# Patient Record
Sex: FEMALE | Race: BLACK OR AFRICAN AMERICAN | NOT HISPANIC OR LATINO | Employment: FULL TIME | URBAN - METROPOLITAN AREA
[De-identification: names, ages, dates, MRNs, and addresses within clinical notes are randomized per-mention and may not be internally consistent; named-entity substitution may affect disease eponyms.]

---

## 2017-11-07 ENCOUNTER — OFFICE VISIT (OUTPATIENT)
Dept: OBSTETRICS AND GYNECOLOGY | Facility: CLINIC | Age: 32
End: 2017-11-07
Payer: COMMERCIAL

## 2017-11-07 VITALS
BODY MASS INDEX: 29.73 KG/M2 | WEIGHT: 196.19 LBS | DIASTOLIC BLOOD PRESSURE: 80 MMHG | SYSTOLIC BLOOD PRESSURE: 120 MMHG | HEIGHT: 68 IN

## 2017-11-07 DIAGNOSIS — N91.2 AMENORRHEA: Primary | ICD-10-CM

## 2017-11-07 DIAGNOSIS — Z32.01 POSITIVE PREGNANCY TEST: ICD-10-CM

## 2017-11-07 LAB
B-HCG UR QL: POSITIVE
CTP QC/QA: YES

## 2017-11-07 PROCEDURE — 81025 URINE PREGNANCY TEST: CPT | Mod: S$GLB,,, | Performed by: OBSTETRICS & GYNECOLOGY

## 2017-11-07 PROCEDURE — 99213 OFFICE O/P EST LOW 20 MIN: CPT | Mod: S$GLB,,, | Performed by: OBSTETRICS & GYNECOLOGY

## 2017-11-07 PROCEDURE — 99999 PR PBB SHADOW E&M-EST. PATIENT-LVL II: CPT | Mod: PBBFAC,,, | Performed by: OBSTETRICS & GYNECOLOGY

## 2017-11-14 NOTE — PROGRESS NOTES
"[facility]  Ambulatory Clinic  Obstetrics & Gynecology    Visit Date:  2017     Chief Complaint:  Missed my period    History of Present Illness:      Pilar Hernandez is a 32 y.o. , UPT positive today, here with c/o missed period.    LMP was 2017.      Pt has no major complaints today and denies any vaginal bleeding, discharge, pain, GI/ compliants.      Pt reports overall good health.    Past Medical History:      None      Past Surgical History:     Vertical sleeve - 2013    Medications:     Prenatal vitamins     Allergies:     NKDA      Obstetric History:      G1, current    Gynecologic History:      Denies recent/active STI  Denies history abnormal Pap     Social History:      Denies tobacco, alcohol or illicit drug use  Current partner is father of baby  Denies domestic abuse     Family History:     Denies congenital anomalies, inherited syndromes, fetal aneuploidy    Review of Systems:      Constitutional:  No fever, fatigue  HENT:  No congestion, hearing changes  Eyes:  No visual disturbance  Respiratory:  No cough, shortness of breath  Cardiovascular:  No chest pain, leg swelling  Breast:  No lump, pain, nipple discharge, redness, skin changes  Gastrointestinal:  No abdominal pain, constipation, blood in stool   Genitourinary:  No dysuria, frequency  Endocrine:  No heat or cold intolerance  Musculoskeletal:  No back pain, arthralgias  Skin:  No rash, jaundice  Neurological:  No dizziness, weakness, headaches  Psychiatric/Behavioral:  No sleep disturbance, dysphoric mood     Physical Exam:     /80 (BP Location: Right arm, Patient Position: Sitting, BP Method: Large (Manual))   Ht 5' 8" (1.727 m)   Wt 89 kg (196 lb 3.4 oz)   LMP 2017 (Exact Date)   BMI 29.83 kg/m²      GENERAL:  NAD. Well-nourished. A&Ox3.  HEENT:  NCAT, EOMI, moist mucus membranes.  Neck supple w/o masses.  BREAST:  Symmetric, no obvious masses, adenopathy, skin changes or " nipple discharge.  LUNGS:  CTA-B.  HEART:  RRR, physiologic heart sounds.  ABDOMEN:  Soft, non-tender. Normoactive BS.  No obvious organomegaly.   EXT:  Symmetric w/o cramping, claudication, or edema. +2 distal pulses. FROM.  SKIN:  No rashes  NEURO:  CN II - XII grossly intact bilaterally. +2 DTR.  PSYCH:  Mood & affect appropriate.       PELVIC:  Female external genitalia w/o any obvious lesions.  Adequate perineal body. Normal urethral meatus. No gross lymphadenopathy.    Vagina:  Pink, moist, well-rugated.  Vaginal vault with good support.  No obvious lesion.  No discharge noted.     Cervix:  No cervical motion tenderness, discharge, or obvious lesions.  Closed, thick, posterior.  Uterus:  Small, non-tender, normal contour.  Adnexa:  No masses or tenderness.    Rectal:  Declined.  No obvious external lesions.   Wet prep:  Negative    Chaperone present for exam.    Assessment:     32 y.o. , UPT positive, LMP 2017, here for confirmation of pregnancy    Plan:    We discussed principles of prenatal care, weight gain goals, dietary/lifestyle modifications, pregnancy care instructions and precautions.  Prenatal educational material given to pt.  Continue prenatal vitamins.  SAB and ectopic precautions reviewed.      Return in 4 weeks to initiate prenatal care.  Go to ED for emergencies.  All questions answered, pt voiced understanding.      Julio Cesar Elias MD

## 2018-01-09 ENCOUNTER — INITIAL PRENATAL (OUTPATIENT)
Dept: OBSTETRICS AND GYNECOLOGY | Facility: CLINIC | Age: 33
End: 2018-01-09
Payer: COMMERCIAL

## 2018-01-09 VITALS
BODY MASS INDEX: 30.5 KG/M2 | WEIGHT: 200.63 LBS | DIASTOLIC BLOOD PRESSURE: 82 MMHG | SYSTOLIC BLOOD PRESSURE: 130 MMHG | HEART RATE: 60 BPM

## 2018-01-09 DIAGNOSIS — Z36.89 ENCOUNTER TO ESTABLISH GESTATIONAL AGE USING ULTRASOUND: ICD-10-CM

## 2018-01-09 DIAGNOSIS — Z34.91 PREGNANCY WITH ONE FETUS IN FIRST TRIMESTER: Primary | ICD-10-CM

## 2018-01-09 PROBLEM — Z34.90 PREGNANCY WITH ONE FETUS: Status: ACTIVE | Noted: 2018-01-09

## 2018-01-09 PROCEDURE — 0502F SUBSEQUENT PRENATAL CARE: CPT | Mod: S$GLB,,, | Performed by: OBSTETRICS & GYNECOLOGY

## 2018-01-09 PROCEDURE — 99999 PR PBB SHADOW E&M-EST. PATIENT-LVL II: CPT | Mod: PBBFAC,,, | Performed by: OBSTETRICS & GYNECOLOGY

## 2018-01-09 PROCEDURE — 76801 OB US < 14 WKS SINGLE FETUS: CPT | Mod: S$GLB,,, | Performed by: OBSTETRICS & GYNECOLOGY

## 2018-01-10 NOTE — PROGRESS NOTES
"[facility]  Ambulatory Clinic  Obstetrics & Gynecology    Visit Date:  2017     Chief Complaint:  Missed my period    History of Present Illness:      Pilar Hernandez is a 32 y.o. , UPT positive today, here with c/o missed period.    LMP was 2017.      Pt has no major complaints today and denies any vaginal bleeding, discharge, pain, GI/ compliants.      Pt reports overall good health.    Past Medical History:      None      Past Surgical History:     Vertical sleeve - 2013    Medications:     Prenatal vitamins     Allergies:     NKDA      Obstetric History:      G1, current    Gynecologic History:      Denies recent/active STI  Denies history abnormal Pap     Social History:      Denies tobacco, alcohol or illicit drug use  Current partner is father of baby  Denies domestic abuse     Family History:     Denies congenital anomalies, inherited syndromes, fetal aneuploidy    Review of Systems:      Constitutional:  No fever, fatigue  HENT:  No congestion, hearing changes  Eyes:  No visual disturbance  Respiratory:  No cough, shortness of breath  Cardiovascular:  No chest pain, leg swelling  Breast:  No lump, pain, nipple discharge, redness, skin changes  Gastrointestinal:  No abdominal pain, constipation, blood in stool   Genitourinary:  No dysuria, frequency  Endocrine:  No heat or cold intolerance  Musculoskeletal:  No back pain, arthralgias  Skin:  No rash, jaundice  Neurological:  No dizziness, weakness, headaches  Psychiatric/Behavioral:  No sleep disturbance, dysphoric mood     Physical Exam:     /80 (BP Location: Right arm, Patient Position: Sitting, BP Method: Large (Manual))   Ht 5' 8" (1.727 m)   Wt 89 kg (196 lb 3.4 oz)   LMP 2017 (Exact Date)   BMI 29.83 kg/m²      GENERAL:  NAD. Well-nourished. A&Ox3.  HEENT:  NCAT, EOMI, moist mucus membranes.  Neck supple w/o masses.  BREAST:  Symmetric, no obvious masses, adenopathy, skin changes or " nipple discharge.  LUNGS:  CTA-B.  HEART:  RRR, physiologic heart sounds.  ABDOMEN:  Soft, non-tender. Normoactive BS.  No obvious organomegaly.   EXT:  Symmetric w/o cramping, claudication, or edema. +2 distal pulses. FROM.  SKIN:  No rashes  NEURO:  CN II - XII grossly intact bilaterally. +2 DTR.  PSYCH:  Mood & affect appropriate.       PELVIC:  Female external genitalia w/o any obvious lesions.  Adequate perineal body. Normal urethral meatus. No gross lymphadenopathy.    Vagina:  Pink, moist, well-rugated.  Vaginal vault with good support.  No obvious lesion.  No discharge noted.     Cervix:  No cervical motion tenderness, discharge, or obvious lesions.  Closed, thick, posterior.  Uterus:  Small, non-tender, normal contour.  Adnexa:  No masses or tenderness.    Rectal:  Declined.  No obvious external lesions.   Wet prep:  Negative    Chaperone present for exam.    Assessment:     32 y.o. , UPT positive, LMP 2017, here for confirmation of pregnancy    Plan:    We discussed principles of prenatal care, weight gain goals, dietary/lifestyle modifications, pregnancy care instructions and precautions.  Prenatal educational material given to pt.  Continue prenatal vitamins.  SAB and ectopic precautions reviewed.      Return in 4 weeks to initiate prenatal care.  Go to ED for emergencies.  All questions answered, pt voiced understanding.      Julio Cesar Elias MD    ______________________________________________    2018    Pt here for initial OB visit and dating ultrasound.  Pt has no major complaints today.  Dating u/s shows single live IUP at 15w5d with AMARILYS 2018.  Limitation of today's u/s exam discussed.  Order initial OB labs and quad screen.  Principles of prenatal care and precautions reviewed.  Return 4 wks.  Voiced understanding  Family present for visit.    Julio Cesar Elias MD  ______________________________________________

## 2018-01-16 ENCOUNTER — LAB VISIT (OUTPATIENT)
Dept: LAB | Facility: HOSPITAL | Age: 33
End: 2018-01-16
Attending: OBSTETRICS & GYNECOLOGY
Payer: COMMERCIAL

## 2018-01-16 DIAGNOSIS — Z34.91 PREGNANCY WITH ONE FETUS IN FIRST TRIMESTER: ICD-10-CM

## 2018-01-16 LAB
ABO + RH BLD: NORMAL
ALBUMIN SERPL BCP-MCNC: 3.7 G/DL
ALP SERPL-CCNC: 52 U/L
ALT SERPL W/O P-5'-P-CCNC: 8 U/L
ANION GAP SERPL CALC-SCNC: 8 MMOL/L
AST SERPL-CCNC: 10 U/L
BASOPHILS # BLD AUTO: 0.02 K/UL
BASOPHILS NFR BLD: 0.3 %
BILIRUB SERPL-MCNC: 0.5 MG/DL
BLD GP AB SCN CELLS X3 SERPL QL: NORMAL
BUN SERPL-MCNC: 7 MG/DL
CALCIUM SERPL-MCNC: 9.3 MG/DL
CHLORIDE SERPL-SCNC: 107 MMOL/L
CO2 SERPL-SCNC: 23 MMOL/L
CREAT SERPL-MCNC: 0.7 MG/DL
DIFFERENTIAL METHOD: ABNORMAL
EOSINOPHIL # BLD AUTO: 0.1 K/UL
EOSINOPHIL NFR BLD: 0.9 %
ERYTHROCYTE [DISTWIDTH] IN BLOOD BY AUTOMATED COUNT: 14.3 %
EST. GFR  (AFRICAN AMERICAN): >60 ML/MIN/1.73 M^2
EST. GFR  (NON AFRICAN AMERICAN): >60 ML/MIN/1.73 M^2
ESTIMATED AVG GLUCOSE: 97 MG/DL
GLUCOSE SERPL-MCNC: 79 MG/DL
HBA1C MFR BLD HPLC: 5 %
HCT VFR BLD AUTO: 34.9 %
HGB BLD-MCNC: 12 G/DL
LYMPHOCYTES # BLD AUTO: 1.5 K/UL
LYMPHOCYTES NFR BLD: 25.9 %
MCH RBC QN AUTO: 28 PG
MCHC RBC AUTO-ENTMCNC: 34.4 G/DL
MCV RBC AUTO: 82 FL
MONOCYTES # BLD AUTO: 0.3 K/UL
MONOCYTES NFR BLD: 4.5 %
NEUTROPHILS # BLD AUTO: 4 K/UL
NEUTROPHILS NFR BLD: 68.2 %
PLATELET # BLD AUTO: 198 K/UL
PMV BLD AUTO: 10 FL
POTASSIUM SERPL-SCNC: 4.4 MMOL/L
PROT SERPL-MCNC: 7.3 G/DL
RBC # BLD AUTO: 4.28 M/UL
SODIUM SERPL-SCNC: 138 MMOL/L
WBC # BLD AUTO: 5.83 K/UL

## 2018-01-16 PROCEDURE — 83036 HEMOGLOBIN GLYCOSYLATED A1C: CPT

## 2018-01-16 PROCEDURE — 86592 SYPHILIS TEST NON-TREP QUAL: CPT

## 2018-01-16 PROCEDURE — 87340 HEPATITIS B SURFACE AG IA: CPT

## 2018-01-16 PROCEDURE — 86762 RUBELLA ANTIBODY: CPT

## 2018-01-16 PROCEDURE — 86901 BLOOD TYPING SEROLOGIC RH(D): CPT

## 2018-01-16 PROCEDURE — 83020 HEMOGLOBIN ELECTROPHORESIS: CPT

## 2018-01-16 PROCEDURE — 80053 COMPREHEN METABOLIC PANEL: CPT

## 2018-01-16 PROCEDURE — 36415 COLL VENOUS BLD VENIPUNCTURE: CPT

## 2018-01-16 PROCEDURE — 86703 HIV-1/HIV-2 1 RESULT ANTBDY: CPT

## 2018-01-16 PROCEDURE — 81511 FTL CGEN ABNOR FOUR ANAL: CPT

## 2018-01-16 PROCEDURE — 85025 COMPLETE CBC W/AUTO DIFF WBC: CPT

## 2018-01-17 LAB
# FETUSES US: NORMAL
2ND TRIMESTER 4 SCREEN PNL SERPL: NEGATIVE
2ND TRIMESTER 4 SCREEN SERPL-IMP: NORMAL
AFP MOM SERPL: 0.91
AFP SERPL-MCNC: 31.9 NG/ML
AGE AT DELIVERY: 33
B-HCG MOM SERPL: 1.01
B-HCG SERPL-ACNC: 29.2 IU/ML
FET TS 21 RISK FROM MAT AGE: NORMAL
GA (DAYS): 5 D
GA (WEEKS): 16 WK
GA METHOD: NORMAL
HBV SURFACE AG SERPL QL IA: NEGATIVE
HIV 1+2 AB+HIV1 P24 AG SERPL QL IA: NEGATIVE
IDDM PATIENT QL: NORMAL
INHIBIN A MOM SERPL: 0.93
INHIBIN A SERPL-MCNC: 122.5 PG/ML
RPR SER QL: NORMAL
RUBV IGG SER-ACNC: 46.7 IU/ML
RUBV IGG SER-IMP: REACTIVE
SMOKING STATUS FTND: NORMAL
TS 18 RISK FETUS: NORMAL
TS 21 RISK FETUS: NORMAL
U ESTRIOL MOM SERPL: 0.8
U ESTRIOL SERPL-MCNC: 0.7 NG/ML

## 2018-01-18 LAB
HGB A2 MFR BLD HPLC: 2.6 %
HGB FRACT BLD ELPH-IMP: NORMAL
HGB FRACT BLD ELPH-IMP: NORMAL

## 2018-02-12 ENCOUNTER — ROUTINE PRENATAL (OUTPATIENT)
Dept: OBSTETRICS AND GYNECOLOGY | Facility: CLINIC | Age: 33
End: 2018-02-12
Payer: COMMERCIAL

## 2018-02-12 VITALS
BODY MASS INDEX: 30.84 KG/M2 | DIASTOLIC BLOOD PRESSURE: 70 MMHG | SYSTOLIC BLOOD PRESSURE: 112 MMHG | WEIGHT: 202.81 LBS

## 2018-02-12 DIAGNOSIS — Z34.92 PREGNANCY WITH ONE FETUS IN SECOND TRIMESTER: Primary | ICD-10-CM

## 2018-02-12 DIAGNOSIS — Z36.3 ANTENATAL SCREENING FOR MALFORMATION USING ULTRASONICS: ICD-10-CM

## 2018-02-12 PROCEDURE — 87186 SC STD MICRODIL/AGAR DIL: CPT

## 2018-02-12 PROCEDURE — 87088 URINE BACTERIA CULTURE: CPT

## 2018-02-12 PROCEDURE — 87491 CHLMYD TRACH DNA AMP PROBE: CPT

## 2018-02-12 PROCEDURE — 0502F SUBSEQUENT PRENATAL CARE: CPT | Mod: S$GLB,,, | Performed by: OBSTETRICS & GYNECOLOGY

## 2018-02-12 PROCEDURE — 76805 OB US >/= 14 WKS SNGL FETUS: CPT | Mod: S$GLB,,, | Performed by: OBSTETRICS & GYNECOLOGY

## 2018-02-12 PROCEDURE — 87077 CULTURE AEROBIC IDENTIFY: CPT

## 2018-02-12 PROCEDURE — 99999 PR PBB SHADOW E&M-EST. PATIENT-LVL III: CPT | Mod: PBBFAC,,, | Performed by: OBSTETRICS & GYNECOLOGY

## 2018-02-12 PROCEDURE — 87086 URINE CULTURE/COLONY COUNT: CPT

## 2018-02-12 NOTE — PROGRESS NOTES
Pt has no complaints.    Rooming in education discussed and given to pt and learn your baby video shown. kt

## 2018-02-13 NOTE — PROGRESS NOTES
20w4d here for OB visit and anatomy ultrasound.  Pt has no major complaints today.  Reports active fetus.  Denies vaginal bleeding, leakage of fluid, or contractions.  Anatomy u/s shows normal appearing fetus with dating c/w established EDC.  Limitation of today's u/s exam discussed.  Quad screen negative.   Principles of prenatal care and precautions reviewed.  Return 4 wks.  Voiced understanding  Family present for visit.

## 2018-02-15 LAB
C TRACH DNA SPEC QL NAA+PROBE: NOT DETECTED
N GONORRHOEA DNA SPEC QL NAA+PROBE: NOT DETECTED

## 2018-02-16 DIAGNOSIS — R82.71 ASYMPTOMATIC BACTERIURIA DURING PREGNANCY: Primary | ICD-10-CM

## 2018-02-16 DIAGNOSIS — O99.891 ASYMPTOMATIC BACTERIURIA DURING PREGNANCY: Primary | ICD-10-CM

## 2018-02-16 LAB — BACTERIA UR CULT: NORMAL

## 2018-02-16 RX ORDER — NITROFURANTOIN 25; 75 MG/1; MG/1
100 CAPSULE ORAL 2 TIMES DAILY
Qty: 14 CAPSULE | Refills: 0 | Status: SHIPPED | OUTPATIENT
Start: 2018-02-16 | End: 2018-02-23

## 2018-03-12 ENCOUNTER — ROUTINE PRENATAL (OUTPATIENT)
Dept: OBSTETRICS AND GYNECOLOGY | Facility: CLINIC | Age: 33
End: 2018-03-12
Payer: COMMERCIAL

## 2018-03-12 ENCOUNTER — LAB VISIT (OUTPATIENT)
Dept: LAB | Facility: HOSPITAL | Age: 33
End: 2018-03-12
Attending: OBSTETRICS & GYNECOLOGY
Payer: COMMERCIAL

## 2018-03-12 VITALS — WEIGHT: 200 LBS | SYSTOLIC BLOOD PRESSURE: 127 MMHG | DIASTOLIC BLOOD PRESSURE: 60 MMHG | BODY MASS INDEX: 30.41 KG/M2

## 2018-03-12 DIAGNOSIS — Z3A.24 24 WEEKS GESTATION OF PREGNANCY: Primary | ICD-10-CM

## 2018-03-12 DIAGNOSIS — Z3A.24 24 WEEKS GESTATION OF PREGNANCY: ICD-10-CM

## 2018-03-12 DIAGNOSIS — O99.842 PREGNANCY COMPLICATED BY PREVIOUS GASTRIC BYPASS, ANTEPARTUM, SECOND TRIMESTER: ICD-10-CM

## 2018-03-12 LAB
ABO + RH BLD: NORMAL
BASOPHILS # BLD AUTO: 0.02 K/UL
BASOPHILS NFR BLD: 0.3 %
BLD GP AB SCN CELLS X3 SERPL QL: NORMAL
DIFFERENTIAL METHOD: ABNORMAL
EOSINOPHIL # BLD AUTO: 0.1 K/UL
EOSINOPHIL NFR BLD: 1.2 %
ERYTHROCYTE [DISTWIDTH] IN BLOOD BY AUTOMATED COUNT: 13.1 %
GLUCOSE SERPL-MCNC: 81 MG/DL
HCT VFR BLD AUTO: 31.7 %
HGB BLD-MCNC: 10.4 G/DL
LYMPHOCYTES # BLD AUTO: 1.6 K/UL
LYMPHOCYTES NFR BLD: 28 %
MCH RBC QN AUTO: 28.2 PG
MCHC RBC AUTO-ENTMCNC: 32.8 G/DL
MCV RBC AUTO: 86 FL
MONOCYTES # BLD AUTO: 0.3 K/UL
MONOCYTES NFR BLD: 5 %
NEUTROPHILS # BLD AUTO: 3.8 K/UL
NEUTROPHILS NFR BLD: 65.2 %
PLATELET # BLD AUTO: 179 K/UL
PMV BLD AUTO: 10.4 FL
RBC # BLD AUTO: 3.69 M/UL
WBC # BLD AUTO: 5.76 K/UL

## 2018-03-12 PROCEDURE — 82950 GLUCOSE TEST: CPT

## 2018-03-12 PROCEDURE — 86850 RBC ANTIBODY SCREEN: CPT

## 2018-03-12 PROCEDURE — 85025 COMPLETE CBC W/AUTO DIFF WBC: CPT

## 2018-03-12 PROCEDURE — 99999 PR PBB SHADOW E&M-EST. PATIENT-LVL II: CPT | Mod: PBBFAC,,, | Performed by: OBSTETRICS & GYNECOLOGY

## 2018-03-12 PROCEDURE — 0502F SUBSEQUENT PRENATAL CARE: CPT | Mod: S$GLB,,, | Performed by: OBSTETRICS & GYNECOLOGY

## 2018-03-17 ENCOUNTER — HOSPITAL ENCOUNTER (OUTPATIENT)
Facility: HOSPITAL | Age: 33
Discharge: HOME | End: 2018-03-17
Attending: OBSTETRICS & GYNECOLOGY | Admitting: OBSTETRICS & GYNECOLOGY
Payer: COMMERCIAL

## 2018-03-17 VITALS
TEMPERATURE: 99 F | DIASTOLIC BLOOD PRESSURE: 77 MMHG | WEIGHT: 200 LBS | RESPIRATION RATE: 18 BRPM | HEART RATE: 87 BPM | HEIGHT: 67 IN | BODY MASS INDEX: 31.39 KG/M2 | SYSTOLIC BLOOD PRESSURE: 123 MMHG

## 2018-03-17 DIAGNOSIS — Z34.90 PREGNANCY WITH ONE FETUS: ICD-10-CM

## 2018-03-17 PROCEDURE — 99211 OFF/OP EST MAY X REQ PHY/QHP: CPT | Mod: 25

## 2018-03-17 RX ORDER — ONDANSETRON 8 MG/1
8 TABLET, ORALLY DISINTEGRATING ORAL EVERY 8 HOURS PRN
Status: DISCONTINUED | OUTPATIENT
Start: 2018-03-17 | End: 2018-03-17 | Stop reason: HOSPADM

## 2018-03-17 RX ORDER — ACETAMINOPHEN 500 MG
500 TABLET ORAL EVERY 6 HOURS PRN
Status: DISCONTINUED | OUTPATIENT
Start: 2018-03-17 | End: 2018-03-17 | Stop reason: HOSPADM
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
## 2018-04-09 ENCOUNTER — ROUTINE PRENATAL (OUTPATIENT)
Dept: OBSTETRICS AND GYNECOLOGY | Facility: CLINIC | Age: 33
End: 2018-04-09
Payer: COMMERCIAL

## 2018-04-09 VITALS
DIASTOLIC BLOOD PRESSURE: 64 MMHG | WEIGHT: 202.81 LBS | BODY MASS INDEX: 31.77 KG/M2 | SYSTOLIC BLOOD PRESSURE: 120 MMHG

## 2018-04-09 DIAGNOSIS — Z34.93 PREGNANCY WITH ONE FETUS IN THIRD TRIMESTER: Primary | ICD-10-CM

## 2018-04-09 PROCEDURE — 99999 PR PBB SHADOW E&M-EST. PATIENT-LVL II: CPT | Mod: PBBFAC,,, | Performed by: OBSTETRICS & GYNECOLOGY

## 2018-04-09 PROCEDURE — 0502F SUBSEQUENT PRENATAL CARE: CPT | Mod: S$GLB,,, | Performed by: OBSTETRICS & GYNECOLOGY

## 2018-04-09 RX ORDER — FERROUS SULFATE 325(65) MG
325 TABLET ORAL 2 TIMES DAILY
Qty: 60 TABLET | Refills: 1 | Status: SHIPPED | OUTPATIENT
Start: 2018-04-09 | End: 2018-06-04 | Stop reason: SDUPTHER

## 2018-04-09 RX ORDER — DOCUSATE SODIUM 100 MG/1
100 CAPSULE, LIQUID FILLED ORAL 2 TIMES DAILY PRN
Qty: 60 CAPSULE | Refills: 1 | Status: SHIPPED | OUTPATIENT
Start: 2018-04-09 | End: 2020-09-01

## 2018-04-09 NOTE — PROGRESS NOTES
Routine OB  Breastfeeding positions education discussed and given to pt and keep baby close video shown. kt

## 2018-04-09 NOTE — PROGRESS NOTES
28w4d here for OB visit.  No major complaints today.  Reports active fetus.  Denies vaginal bleeding, leakage of fluid, or contractions.  1 hr glucola 81.  Start fergon/colace for mild anemia.  Labor/preE precautions.  Kick counts.  Return 2 wks.  Voiced understanding.

## 2018-04-23 ENCOUNTER — CLINICAL SUPPORT (OUTPATIENT)
Dept: OBSTETRICS AND GYNECOLOGY | Facility: CLINIC | Age: 33
End: 2018-04-23
Payer: COMMERCIAL

## 2018-04-23 ENCOUNTER — ROUTINE PRENATAL (OUTPATIENT)
Dept: OBSTETRICS AND GYNECOLOGY | Facility: CLINIC | Age: 33
End: 2018-04-23
Payer: COMMERCIAL

## 2018-04-23 VITALS
WEIGHT: 207.25 LBS | DIASTOLIC BLOOD PRESSURE: 82 MMHG | SYSTOLIC BLOOD PRESSURE: 115 MMHG | BODY MASS INDEX: 32.46 KG/M2

## 2018-04-23 VITALS
WEIGHT: 207.25 LBS | BODY MASS INDEX: 32.53 KG/M2 | HEIGHT: 67 IN | DIASTOLIC BLOOD PRESSURE: 82 MMHG | SYSTOLIC BLOOD PRESSURE: 115 MMHG

## 2018-04-23 DIAGNOSIS — Z34.93 PREGNANCY WITH ONE FETUS IN THIRD TRIMESTER: Primary | ICD-10-CM

## 2018-04-23 DIAGNOSIS — Z23 NEED FOR DIPHTHERIA-TETANUS-PERTUSSIS (TDAP) VACCINE: Primary | ICD-10-CM

## 2018-04-23 DIAGNOSIS — Z23 NEED FOR DIPHTHERIA-TETANUS-PERTUSSIS (TDAP) VACCINE: ICD-10-CM

## 2018-04-23 PROCEDURE — 90471 IMMUNIZATION ADMIN: CPT | Mod: S$GLB,,, | Performed by: OBSTETRICS & GYNECOLOGY

## 2018-04-23 PROCEDURE — 90715 TDAP VACCINE 7 YRS/> IM: CPT | Mod: S$GLB,,, | Performed by: OBSTETRICS & GYNECOLOGY

## 2018-04-23 PROCEDURE — 99999 PR PBB SHADOW E&M-EST. PATIENT-LVL II: CPT | Mod: PBBFAC,,, | Performed by: OBSTETRICS & GYNECOLOGY

## 2018-04-23 PROCEDURE — 99999 PR PBB SHADOW E&M-EST. PATIENT-LVL II: CPT | Mod: PBBFAC,,,

## 2018-04-23 PROCEDURE — 0502F SUBSEQUENT PRENATAL CARE: CPT | Mod: S$GLB,,, | Performed by: OBSTETRICS & GYNECOLOGY

## 2018-04-23 NOTE — PROGRESS NOTES
30w4d here for OB visit.  No major complaints today.  Reports active fetus.  Denies vaginal bleeding, leakage of fluid, or contractions.  Continue fergon/colace for mild anemia.  Risks and benefits of the Tdap vaccine discussed and encouraged.   Labor/preE precautions.  Kick counts.  Return 2 wks.  Voiced understanding.  Significant other present for visit.

## 2018-04-23 NOTE — PROGRESS NOTES
Patient seen in clinic today. Received tdap injection via left deltoid site. Tolerated injection without any difficulty. Educational material given. CW

## 2018-04-23 NOTE — PROGRESS NOTES
Discussed tdap vaccine. Pt scheduled today.  Routine OB  Breast milk education discussed and given to pt and protect breastfeeding video shown. cedric

## 2018-05-07 ENCOUNTER — ROUTINE PRENATAL (OUTPATIENT)
Dept: OBSTETRICS AND GYNECOLOGY | Facility: CLINIC | Age: 33
End: 2018-05-07
Payer: COMMERCIAL

## 2018-05-07 VITALS
WEIGHT: 207.25 LBS | BODY MASS INDEX: 32.46 KG/M2 | SYSTOLIC BLOOD PRESSURE: 116 MMHG | DIASTOLIC BLOOD PRESSURE: 60 MMHG

## 2018-05-07 DIAGNOSIS — Z34.93 PREGNANCY WITH ONE FETUS IN THIRD TRIMESTER: Primary | ICD-10-CM

## 2018-05-07 PROCEDURE — 0502F SUBSEQUENT PRENATAL CARE: CPT | Mod: S$GLB,,, | Performed by: OBSTETRICS & GYNECOLOGY

## 2018-05-07 PROCEDURE — 87086 URINE CULTURE/COLONY COUNT: CPT

## 2018-05-07 PROCEDURE — 99999 PR PBB SHADOW E&M-EST. PATIENT-LVL III: CPT | Mod: PBBFAC,,, | Performed by: OBSTETRICS & GYNECOLOGY

## 2018-05-08 NOTE — PROGRESS NOTES
32w4d here for OB visit. No major complaints today. Reports active fetus. Denies vaginal bleeding, leakage of fluid, or contractions. Continue fergon/colace for anemia. Labor/preE precautions. Kick counts. Return 2 wks. Voiced understanding.

## 2018-05-09 LAB — BACTERIA UR CULT: NORMAL

## 2018-05-21 ENCOUNTER — ROUTINE PRENATAL (OUTPATIENT)
Dept: OBSTETRICS AND GYNECOLOGY | Facility: CLINIC | Age: 33
End: 2018-05-21
Payer: COMMERCIAL

## 2018-05-21 VITALS — SYSTOLIC BLOOD PRESSURE: 112 MMHG | DIASTOLIC BLOOD PRESSURE: 74 MMHG | WEIGHT: 205 LBS | BODY MASS INDEX: 32.11 KG/M2

## 2018-05-21 DIAGNOSIS — Z34.93 PREGNANCY WITH ONE FETUS IN THIRD TRIMESTER: Primary | ICD-10-CM

## 2018-05-21 PROCEDURE — 0502F SUBSEQUENT PRENATAL CARE: CPT | Mod: S$GLB,,, | Performed by: OBSTETRICS & GYNECOLOGY

## 2018-05-21 PROCEDURE — 99999 PR PBB SHADOW E&M-EST. PATIENT-LVL II: CPT | Mod: PBBFAC,,, | Performed by: OBSTETRICS & GYNECOLOGY

## 2018-05-21 NOTE — PROGRESS NOTES
34w4d here for OB visit.   Pt has no major complaints today.   Reports active fetus.   Denies vaginal bleeding, leakage of fluid, or contractions.   Continue fergon/colace for anemia.   Labor/preE precautions.   Kick counts.   Return 1 wk.   Voiced understanding.

## 2018-05-28 ENCOUNTER — ROUTINE PRENATAL (OUTPATIENT)
Dept: OBSTETRICS AND GYNECOLOGY | Facility: CLINIC | Age: 33
End: 2018-05-28
Payer: COMMERCIAL

## 2018-05-28 VITALS
DIASTOLIC BLOOD PRESSURE: 80 MMHG | SYSTOLIC BLOOD PRESSURE: 126 MMHG | WEIGHT: 207.69 LBS | BODY MASS INDEX: 32.53 KG/M2

## 2018-05-28 DIAGNOSIS — Z34.93 PREGNANCY WITH ONE FETUS IN THIRD TRIMESTER: Primary | ICD-10-CM

## 2018-05-28 PROCEDURE — 0502F SUBSEQUENT PRENATAL CARE: CPT | Mod: S$GLB,,, | Performed by: OBSTETRICS & GYNECOLOGY

## 2018-05-28 PROCEDURE — 99999 PR PBB SHADOW E&M-EST. PATIENT-LVL II: CPT | Mod: PBBFAC,,, | Performed by: OBSTETRICS & GYNECOLOGY

## 2018-05-28 NOTE — PROGRESS NOTES
35w4d here for OB visit. No major complaints today. Reports active fetus. Denies vaginal bleeding, leakage of fluid, or contractions. Continue fergon/colace for anemia. Labor/preE precautions. Kick counts.   Return 1 wk. Voiced understanding. Family present for visit.

## 2018-06-04 DIAGNOSIS — Z34.93 PREGNANCY WITH ONE FETUS IN THIRD TRIMESTER: ICD-10-CM

## 2018-06-04 RX ORDER — FERROUS SULFATE 325(65) MG
325 TABLET ORAL 2 TIMES DAILY
Qty: 60 TABLET | Refills: 1 | Status: SHIPPED | OUTPATIENT
Start: 2018-06-04 | End: 2018-11-29

## 2018-06-06 ENCOUNTER — ROUTINE PRENATAL (OUTPATIENT)
Dept: OBSTETRICS AND GYNECOLOGY | Facility: CLINIC | Age: 33
End: 2018-06-06
Payer: COMMERCIAL

## 2018-06-06 VITALS — WEIGHT: 207 LBS | SYSTOLIC BLOOD PRESSURE: 122 MMHG | BODY MASS INDEX: 32.42 KG/M2 | DIASTOLIC BLOOD PRESSURE: 66 MMHG

## 2018-06-06 DIAGNOSIS — Z34.93 PREGNANCY WITH ONE FETUS IN THIRD TRIMESTER: Primary | ICD-10-CM

## 2018-06-06 PROCEDURE — 87147 CULTURE TYPE IMMUNOLOGIC: CPT

## 2018-06-06 PROCEDURE — 99999 PR PBB SHADOW E&M-EST. PATIENT-LVL II: CPT | Mod: PBBFAC,,, | Performed by: OBSTETRICS & GYNECOLOGY

## 2018-06-06 PROCEDURE — 87491 CHLMYD TRACH DNA AMP PROBE: CPT

## 2018-06-06 PROCEDURE — 0502F SUBSEQUENT PRENATAL CARE: CPT | Mod: S$GLB,,, | Performed by: OBSTETRICS & GYNECOLOGY

## 2018-06-06 PROCEDURE — 87081 CULTURE SCREEN ONLY: CPT

## 2018-06-06 NOTE — PROGRESS NOTES
36w6d here for OB visit.   No major complaints today.   Reports active fetus.   Denies vaginal bleeding, leakage of fluid, or contractions.   Continue fergon/colace for anemia.   Order GBS.  Delivery consents signed.  Labor/preE precautions.   Kick counts.   Return 1 wk.   Voiced understanding.   Significant other present for visit.

## 2018-06-06 NOTE — PROGRESS NOTES
Routine OB, patient has no complaints. Patient is aware of skin to skin, handouts given, video viewed. sal

## 2018-06-08 LAB
BACTERIA SPEC AEROBE CULT: NORMAL
C TRACH DNA SPEC QL NAA+PROBE: NOT DETECTED
N GONORRHOEA DNA SPEC QL NAA+PROBE: NOT DETECTED

## 2018-06-13 ENCOUNTER — ROUTINE PRENATAL (OUTPATIENT)
Dept: OBSTETRICS AND GYNECOLOGY | Facility: CLINIC | Age: 33
End: 2018-06-13
Payer: COMMERCIAL

## 2018-06-13 VITALS
DIASTOLIC BLOOD PRESSURE: 64 MMHG | WEIGHT: 207.81 LBS | BODY MASS INDEX: 32.54 KG/M2 | SYSTOLIC BLOOD PRESSURE: 110 MMHG

## 2018-06-13 DIAGNOSIS — Z34.93 PREGNANCY WITH ONE FETUS IN THIRD TRIMESTER: Primary | ICD-10-CM

## 2018-06-13 PROCEDURE — 0502F SUBSEQUENT PRENATAL CARE: CPT | Mod: S$GLB,,, | Performed by: OBSTETRICS & GYNECOLOGY

## 2018-06-13 PROCEDURE — 99999 PR PBB SHADOW E&M-EST. PATIENT-LVL II: CPT | Mod: PBBFAC,,, | Performed by: OBSTETRICS & GYNECOLOGY

## 2018-06-13 NOTE — PROGRESS NOTES
37w6d here for OB visit.   No major complaints today.   Reports active fetus.   Denies vaginal bleeding, leakage of fluid, or contractions.   Continue fergon/colace for anemia.   GBS positive, discussed intrapartum antibx.  Labor/preE precautions.   Kick counts.   Return 1 wk.   Voiced understanding.

## 2018-06-20 ENCOUNTER — ROUTINE PRENATAL (OUTPATIENT)
Dept: OBSTETRICS AND GYNECOLOGY | Facility: CLINIC | Age: 33
End: 2018-06-20
Payer: COMMERCIAL

## 2018-06-20 VITALS
WEIGHT: 208.56 LBS | SYSTOLIC BLOOD PRESSURE: 104 MMHG | DIASTOLIC BLOOD PRESSURE: 60 MMHG | BODY MASS INDEX: 32.66 KG/M2

## 2018-06-20 DIAGNOSIS — Z3A.38 38 WEEKS GESTATION OF PREGNANCY: Primary | ICD-10-CM

## 2018-06-20 PROCEDURE — 0502F SUBSEQUENT PRENATAL CARE: CPT | Mod: S$GLB,,, | Performed by: OBSTETRICS & GYNECOLOGY

## 2018-06-20 PROCEDURE — 99999 PR PBB SHADOW E&M-EST. PATIENT-LVL II: CPT | Mod: PBBFAC,,, | Performed by: OBSTETRICS & GYNECOLOGY

## 2018-06-20 RX ORDER — ONDANSETRON 4 MG/1
8 TABLET, ORALLY DISINTEGRATING ORAL EVERY 8 HOURS PRN
Status: CANCELLED | OUTPATIENT
Start: 2018-06-20

## 2018-06-20 RX ORDER — SODIUM CHLORIDE, SODIUM LACTATE, POTASSIUM CHLORIDE, CALCIUM CHLORIDE 600; 310; 30; 20 MG/100ML; MG/100ML; MG/100ML; MG/100ML
INJECTION, SOLUTION INTRAVENOUS CONTINUOUS
Status: CANCELLED | OUTPATIENT
Start: 2018-06-20

## 2018-06-20 RX ORDER — BUTORPHANOL TARTRATE 1 MG/ML
1 INJECTION INTRAMUSCULAR; INTRAVENOUS
Status: CANCELLED | OUTPATIENT
Start: 2018-06-20

## 2018-06-20 RX ORDER — SODIUM CHLORIDE 9 MG/ML
INJECTION, SOLUTION INTRAVENOUS
Status: CANCELLED | OUTPATIENT
Start: 2018-06-20

## 2018-06-20 RX ORDER — MISOPROSTOL 100 UG/1
600 TABLET ORAL
Status: CANCELLED | OUTPATIENT
Start: 2018-06-20

## 2018-06-20 NOTE — PROGRESS NOTES
38w6d here for OB visit.   No major complaints today.   Reports active fetus.   Denies vaginal bleeding, leakage of fluid, or contractions.   Pt is requesting induction of labor this .  Benefits of waiting for spontaneous labor discussed.  Risks, benefits, and alternatives to IOL reviewed including but not limited to failed induction resulting .  Pt is resolute in her decision to proceed with IOL.  GBS positive, discussed intrapartum antibx.  Labor/preE precautions.   Kick counts.   Return prn.  Go to L&D for any concerns.  Voiced understanding.

## 2018-06-24 ENCOUNTER — HOSPITAL ENCOUNTER (INPATIENT)
Facility: HOSPITAL | Age: 33
LOS: 3 days | Discharge: HOME | End: 2018-06-27
Attending: OBSTETRICS & GYNECOLOGY | Admitting: OBSTETRICS & GYNECOLOGY
Payer: COMMERCIAL

## 2018-06-24 ENCOUNTER — HOSPITAL ENCOUNTER (OUTPATIENT)
Dept: OBSTETRICS AND GYNECOLOGY | Facility: HOSPITAL | Age: 33
Discharge: HOME | End: 2018-06-24
Attending: OBSTETRICS & GYNECOLOGY
Payer: COMMERCIAL

## 2018-06-24 DIAGNOSIS — Z3A.39 39 WEEKS GESTATION OF PREGNANCY: ICD-10-CM

## 2018-06-24 DIAGNOSIS — Z3A.38 38 WEEKS GESTATION OF PREGNANCY: ICD-10-CM

## 2018-06-24 LAB
ABO + RH BLD: NORMAL
BASOPHILS # BLD AUTO: 0.03 K/UL
BASOPHILS NFR BLD: 0.5 %
BLD GP AB SCN CELLS X3 SERPL QL: NORMAL
DIFFERENTIAL METHOD: ABNORMAL
EOSINOPHIL # BLD AUTO: 0 K/UL
EOSINOPHIL NFR BLD: 0.7 %
ERYTHROCYTE [DISTWIDTH] IN BLOOD BY AUTOMATED COUNT: 13.3 %
HCT VFR BLD AUTO: 32.2 %
HGB BLD-MCNC: 10.9 G/DL
LYMPHOCYTES # BLD AUTO: 1.6 K/UL
LYMPHOCYTES NFR BLD: 29.1 %
MCH RBC QN AUTO: 28.2 PG
MCHC RBC AUTO-ENTMCNC: 33.9 G/DL
MCV RBC AUTO: 83 FL
MONOCYTES # BLD AUTO: 0.5 K/UL
MONOCYTES NFR BLD: 8.6 %
NEUTROPHILS # BLD AUTO: 3.3 K/UL
NEUTROPHILS NFR BLD: 60.9 %
PLATELET # BLD AUTO: 176 K/UL
PMV BLD AUTO: 11.4 FL
RBC # BLD AUTO: 3.86 M/UL
WBC # BLD AUTO: 5.46 K/UL

## 2018-06-24 PROCEDURE — 11000001 HC ACUTE MED/SURG PRIVATE ROOM

## 2018-06-24 PROCEDURE — 72100003 HC LABOR CARE, EA. ADDL. 8 HRS

## 2018-06-24 PROCEDURE — 72100002 HC LABOR CARE, 1ST 8 HOURS

## 2018-06-24 PROCEDURE — 86850 RBC ANTIBODY SCREEN: CPT

## 2018-06-24 PROCEDURE — 86592 SYPHILIS TEST NON-TREP QUAL: CPT

## 2018-06-24 PROCEDURE — 63600175 PHARM REV CODE 636 W HCPCS: Performed by: OBSTETRICS & GYNECOLOGY

## 2018-06-24 PROCEDURE — 85025 COMPLETE CBC W/AUTO DIFF WBC: CPT

## 2018-06-24 PROCEDURE — 25000003 PHARM REV CODE 250: Performed by: OBSTETRICS & GYNECOLOGY

## 2018-06-24 RX ORDER — SODIUM CHLORIDE, SODIUM LACTATE, POTASSIUM CHLORIDE, CALCIUM CHLORIDE 600; 310; 30; 20 MG/100ML; MG/100ML; MG/100ML; MG/100ML
INJECTION, SOLUTION INTRAVENOUS CONTINUOUS
Status: DISCONTINUED | OUTPATIENT
Start: 2018-06-24 | End: 2018-06-27 | Stop reason: HOSPADM

## 2018-06-24 RX ORDER — MISOPROSTOL 200 UG/1
600 TABLET ORAL
Status: DISCONTINUED | OUTPATIENT
Start: 2018-06-24 | End: 2018-06-27 | Stop reason: HOSPADM

## 2018-06-24 RX ORDER — ONDANSETRON 8 MG/1
8 TABLET, ORALLY DISINTEGRATING ORAL EVERY 8 HOURS PRN
Status: DISCONTINUED | OUTPATIENT
Start: 2018-06-24 | End: 2018-06-27 | Stop reason: HOSPADM

## 2018-06-24 RX ORDER — BUTORPHANOL TARTRATE 2 MG/ML
1 INJECTION INTRAMUSCULAR; INTRAVENOUS
Status: DISCONTINUED | OUTPATIENT
Start: 2018-06-24 | End: 2018-06-27 | Stop reason: HOSPADM

## 2018-06-24 RX ORDER — OXYTOCIN/RINGER'S LACTATE 20/1000 ML
2 PLASTIC BAG, INJECTION (ML) INTRAVENOUS CONTINUOUS
Status: DISCONTINUED | OUTPATIENT
Start: 2018-06-25 | End: 2018-06-27 | Stop reason: HOSPADM

## 2018-06-24 RX ORDER — SODIUM CHLORIDE 9 MG/ML
INJECTION, SOLUTION INTRAVENOUS
Status: DISCONTINUED | OUTPATIENT
Start: 2018-06-24 | End: 2018-06-27 | Stop reason: HOSPADM

## 2018-06-24 RX ADMIN — AMPICILLIN SODIUM 2 G: 2 INJECTION, POWDER, FOR SOLUTION INTRAMUSCULAR; INTRAVENOUS at 05:06

## 2018-06-24 RX ADMIN — SODIUM CHLORIDE, SODIUM LACTATE, POTASSIUM CHLORIDE, AND CALCIUM CHLORIDE: .6; .31; .03; .02 INJECTION, SOLUTION INTRAVENOUS at 05:06

## 2018-06-24 RX ADMIN — AMPICILLIN SODIUM 1 G: 1 INJECTION, POWDER, FOR SOLUTION INTRAMUSCULAR; INTRAVENOUS at 08:06

## 2018-06-24 RX ADMIN — DINOPROSTONE 10 MG: 10 INSERT VAGINAL at 05:06

## 2018-06-24 NOTE — NURSING
Instructed on cervidil & pitocin induction, on IV antibiotics for GBS and on progression of labor, verbalized understanding.

## 2018-06-24 NOTE — H&P
[facility]    Obstetrics & Gynecology  History & Physical      Patient Name:  Pilar Hernandez  MRN:  [id number]  Admission Date:  2018  Hospital Length of Stay:  0  Attending Physician:  Julio Cesar Elias MD  Primary Care Provider:  Charly Robin Jr, MD    Date:  2018    Principal Problem:  Pregnancy with one fetus     Hospital Course:  IUP at 39w3d for induction of labor secondary to maternal request    Chief Complaint:  Induction of labor    History of Present Illness:      Pilar Hernandez is a 33 y.o.  at 39w3d for induction of labor secondary to maternal request.  Pt has no major complaints.  Pt reports active fetal movements and occasional mild contractions, and denies any vaginal bleeding or leakage of fluid.  Antepartum course has been overall uneventful.    Past Medical History:      None       Past Surgical History:      Vertical sleeve - 2013     Medications:      Prenatal vitamins   Fergon  Colace     Allergies:      NKDA       Obstetric History:       G1, current     Gynecologic History:      Denies recent/active STI  Denies history abnormal Pap     Social History:       Denies tobacco, alcohol or illicit drug use  Current partner is father of baby  Denies domestic abuse     Family History:      Denies congenital anomalies, inherited syndromes, fetal aneuploidy     Review of Systems   Constitutional: Negative.    HENT: Negative.    Eyes: Negative.    Respiratory: Negative.    Cardiovascular: Negative.    Gastrointestinal: Negative.    Endocrine: Negative.    Genitourinary: Negative.    Musculoskeletal: Positive for back pain.   Skin:  Negative.   Neurological: Negative.    Hematological: Negative.    Psychiatric/Behavioral: Negative.    Breast: negative.      Physical Exam:   Constitutional: No distress.                             VITALS:  See charting.  GENERAL:  No acute distress.  Alert and oriented to person, place and time.  HEENT:   Normocephalic, atraumatic, anicteric, EOMI, moist mucus membranes.  Neck supple without masses.  LUNGS:  Clear to auscultation bilaterally.  HEART:  Regular rate & rhythm with physiologic heart sounds.  ABDOMEN:  Soft, non tender without any guarding, rigidity or rebound. Normoactive bowel sounds.  PELVIC:  Normal female external genitalia without gross lesions, rashes or excoriations. No gross vaginal or cervical lesions.  Adequate pelvis.  Cervix: See charting.  EXTREMITIES:  Symmetric without cramping, claudication. Trace edema LE. +2 distal pulses.  Full range of motion.  SKIN:  No rashes, good turgor & capillary refill.  NEUROLOGIC:  Grossly intact bilaterally. +2 DTR, symmetric.  PSYCH:  Mood & affect appropriate.       Assessment:     1. 33 y.o.  at 39w3d for induction of labor secondary to maternal request  2. GBS+    Plan:    Admit to L&D for induction of labor    The patient was informed of the risks, benefits, alternatives and possible complications to induction of labor (with cervidil, cytotec, pitocin, and/or garg bulb), vaginal delivery, operative vaginal delivery,  section, blood transfusion, and the possibility of failed labor induction resulting in a  section due to maternal or fetal indications.  All questions were answered to her satisfaction.  She voiced understanding and acceptance of these risks.  Informed consents were obtained for delivery, labor induction and blood transfusions.    Routine admit labs    Continuous fetal monitoring    Epidural prn    GBS prophylaxis      Julio Cesar Elias MD

## 2018-06-25 ENCOUNTER — ANESTHESIA EVENT (OUTPATIENT)
Dept: OBSTETRICS AND GYNECOLOGY | Facility: HOSPITAL | Age: 33
End: 2018-06-25
Payer: COMMERCIAL

## 2018-06-25 ENCOUNTER — ANESTHESIA (OUTPATIENT)
Dept: OBSTETRICS AND GYNECOLOGY | Facility: HOSPITAL | Age: 33
End: 2018-06-25
Payer: COMMERCIAL

## 2018-06-25 PROBLEM — Z3A.39 39 WEEKS GESTATION OF PREGNANCY: Status: ACTIVE | Noted: 2018-06-24

## 2018-06-25 LAB — RPR SER QL: NORMAL

## 2018-06-25 PROCEDURE — 63600175 PHARM REV CODE 636 W HCPCS: Performed by: OBSTETRICS & GYNECOLOGY

## 2018-06-25 PROCEDURE — 11000001 HC ACUTE MED/SURG PRIVATE ROOM

## 2018-06-25 PROCEDURE — 10907ZC DRAINAGE OF AMNIOTIC FLUID, THERAPEUTIC FROM PRODUCTS OF CONCEPTION, VIA NATURAL OR ARTIFICIAL OPENING: ICD-10-PCS | Performed by: OBSTETRICS & GYNECOLOGY

## 2018-06-25 PROCEDURE — 51702 INSERT TEMP BLADDER CATH: CPT

## 2018-06-25 PROCEDURE — 59400 OBSTETRICAL CARE: CPT | Mod: AA,,, | Performed by: ANESTHESIOLOGY

## 2018-06-25 PROCEDURE — 25000003 PHARM REV CODE 250: Performed by: OBSTETRICS & GYNECOLOGY

## 2018-06-25 PROCEDURE — 62326 NJX INTERLAMINAR LMBR/SAC: CPT | Performed by: ANESTHESIOLOGY

## 2018-06-25 PROCEDURE — 72100003 HC LABOR CARE, EA. ADDL. 8 HRS

## 2018-06-25 PROCEDURE — 0KQM0ZZ REPAIR PERINEUM MUSCLE, OPEN APPROACH: ICD-10-PCS | Performed by: OBSTETRICS & GYNECOLOGY

## 2018-06-25 PROCEDURE — 27800516 HC TRAY, EPIDURAL COMBO: Performed by: ANESTHESIOLOGY

## 2018-06-25 PROCEDURE — 27200710 HC EPIDURAL INFUSION PUMP SET: Performed by: ANESTHESIOLOGY

## 2018-06-25 PROCEDURE — 72200005 HC VAGINAL DELIVERY LEVEL II

## 2018-06-25 PROCEDURE — 59400 OBSTETRICAL CARE: CPT | Mod: GB,,, | Performed by: OBSTETRICS & GYNECOLOGY

## 2018-06-25 PROCEDURE — 25000003 PHARM REV CODE 250: Performed by: ANESTHESIOLOGY

## 2018-06-25 RX ORDER — FENTANYL/BUPIVACAINE/NS/PF 2MCG/ML-.1
PLASTIC BAG, INJECTION (ML) INJECTION CONTINUOUS PRN
Status: DISCONTINUED | OUTPATIENT
Start: 2018-06-25 | End: 2018-06-25

## 2018-06-25 RX ORDER — HYDROCORTISONE 25 MG/G
CREAM TOPICAL 3 TIMES DAILY PRN
Status: DISCONTINUED | OUTPATIENT
Start: 2018-06-25 | End: 2018-06-27 | Stop reason: HOSPADM

## 2018-06-25 RX ORDER — DIPHENHYDRAMINE HYDROCHLORIDE 50 MG/ML
25 INJECTION INTRAMUSCULAR; INTRAVENOUS EVERY 4 HOURS PRN
Status: DISCONTINUED | OUTPATIENT
Start: 2018-06-25 | End: 2018-06-27 | Stop reason: HOSPADM

## 2018-06-25 RX ORDER — IBUPROFEN 600 MG/1
600 TABLET ORAL EVERY 6 HOURS PRN
Status: DISCONTINUED | OUTPATIENT
Start: 2018-06-25 | End: 2018-06-27 | Stop reason: HOSPADM

## 2018-06-25 RX ORDER — DIPHENHYDRAMINE HCL 25 MG
25 CAPSULE ORAL EVERY 4 HOURS PRN
Status: DISCONTINUED | OUTPATIENT
Start: 2018-06-25 | End: 2018-06-27 | Stop reason: HOSPADM

## 2018-06-25 RX ORDER — BUPIVACAINE HYDROCHLORIDE 2.5 MG/ML
INJECTION, SOLUTION EPIDURAL; INFILTRATION; INTRACAUDAL
Status: DISCONTINUED | OUTPATIENT
Start: 2018-06-25 | End: 2018-06-25

## 2018-06-25 RX ORDER — OXYCODONE AND ACETAMINOPHEN 5; 325 MG/1; MG/1
1 TABLET ORAL EVERY 4 HOURS PRN
Status: DISCONTINUED | OUTPATIENT
Start: 2018-06-25 | End: 2018-06-27 | Stop reason: HOSPADM

## 2018-06-25 RX ORDER — SIMETHICONE 80 MG
1 TABLET,CHEWABLE ORAL EVERY 6 HOURS PRN
Status: DISCONTINUED | OUTPATIENT
Start: 2018-06-25 | End: 2018-06-27 | Stop reason: HOSPADM

## 2018-06-25 RX ORDER — OXYCODONE AND ACETAMINOPHEN 10; 325 MG/1; MG/1
1 TABLET ORAL EVERY 4 HOURS PRN
Status: DISCONTINUED | OUTPATIENT
Start: 2018-06-25 | End: 2018-06-27 | Stop reason: HOSPADM

## 2018-06-25 RX ORDER — DOCUSATE SODIUM 100 MG/1
200 CAPSULE, LIQUID FILLED ORAL 2 TIMES DAILY PRN
Status: DISCONTINUED | OUTPATIENT
Start: 2018-06-25 | End: 2018-06-27 | Stop reason: HOSPADM

## 2018-06-25 RX ORDER — OXYTOCIN/RINGER'S LACTATE 20/1000 ML
41.65 PLASTIC BAG, INJECTION (ML) INTRAVENOUS CONTINUOUS
Status: ACTIVE | OUTPATIENT
Start: 2018-06-25 | End: 2018-06-26

## 2018-06-25 RX ORDER — PRENATAL WITH FERROUS FUM AND FOLIC ACID 3080; 920; 120; 400; 22; 1.84; 3; 20; 10; 1; 12; 200; 27; 25; 2 [IU]/1; [IU]/1; MG/1; [IU]/1; MG/1; MG/1; MG/1; MG/1; MG/1; MG/1; UG/1; MG/1; MG/1; MG/1; MG/1
1 TABLET ORAL DAILY
Status: CANCELLED | OUTPATIENT
Start: 2018-06-26

## 2018-06-25 RX ORDER — ONDANSETRON 8 MG/1
8 TABLET, ORALLY DISINTEGRATING ORAL EVERY 8 HOURS PRN
Status: DISCONTINUED | OUTPATIENT
Start: 2018-06-25 | End: 2018-06-27 | Stop reason: HOSPADM

## 2018-06-25 RX ADMIN — AMPICILLIN SODIUM 1 G: 1 INJECTION, POWDER, FOR SOLUTION INTRAMUSCULAR; INTRAVENOUS at 05:06

## 2018-06-25 RX ADMIN — AMPICILLIN SODIUM 1 G: 1 INJECTION, POWDER, FOR SOLUTION INTRAMUSCULAR; INTRAVENOUS at 12:06

## 2018-06-25 RX ADMIN — Medication 2 MILLI-UNITS/MIN: at 04:06

## 2018-06-25 RX ADMIN — BUPIVACAINE HYDROCHLORIDE 0.8 ML: 2.5 INJECTION, SOLUTION EPIDURAL; INFILTRATION; INTRACAUDAL; PERINEURAL at 10:06

## 2018-06-25 RX ADMIN — Medication 10 ML/HR: at 10:06

## 2018-06-25 RX ADMIN — Medication 2 MILLI-UNITS/MIN: at 08:06

## 2018-06-25 RX ADMIN — AMPICILLIN SODIUM 1 G: 1 INJECTION, POWDER, FOR SOLUTION INTRAMUSCULAR; INTRAVENOUS at 09:06

## 2018-06-25 RX ADMIN — SODIUM CHLORIDE, SODIUM LACTATE, POTASSIUM CHLORIDE, AND CALCIUM CHLORIDE: .6; .31; .03; .02 INJECTION, SOLUTION INTRAVENOUS at 09:06

## 2018-06-25 RX ADMIN — AMPICILLIN SODIUM 1 G: 1 INJECTION, POWDER, FOR SOLUTION INTRAMUSCULAR; INTRAVENOUS at 04:06

## 2018-06-25 RX ADMIN — SODIUM CHLORIDE, SODIUM LACTATE, POTASSIUM CHLORIDE, AND CALCIUM CHLORIDE: .6; .31; .03; .02 INJECTION, SOLUTION INTRAVENOUS at 03:06

## 2018-06-25 NOTE — ANESTHESIA PREPROCEDURE EVALUATION
06/25/2018  Pilar Hernandez is a 33 y.o., female.    Anesthesia Evaluation    I have reviewed the Patient Summary Reports.     I have reviewed the Medications.     Review of Systems  Anesthesia Hx:  No problems with previous Anesthesia    Social:  Non-Smoker, Alcohol Use    Cardiovascular:   Exercise tolerance: good        Physical Exam  General:  Well nourished    Airway/Jaw/Neck:  Airway Findings: Mouth Opening: Normal Tongue: Normal  General Airway Assessment: Adult  Mallampati: II  TM Distance: Normal, at least 6 cm  Jaw/Neck Findings:  Neck ROM: Normal ROM      Dental:  Dental Findings: In tact   Chest/Lungs:  Chest/Lungs Findings: Clear to auscultation, Normal Respiratory Rate     Heart/Vascular:  Heart Findings: Rate: Normal        Mental Status:  Mental Status Findings:  Cooperative, Alert and Oriented         Anesthesia Plan  Type of Anesthesia, risks & benefits discussed:  Anesthesia Type:  CSE  Patient's Preference:   Intra-op Monitoring Plan:   Intra-op Monitoring Plan Comments:   Post Op Pain Control Plan:   Post Op Pain Control Plan Comments:   Induction:   IV  Beta Blocker:  Patient is not currently on a Beta-Blocker (No further documentation required).       Informed Consent: Patient understands risks and agrees with Anesthesia plan.  Questions answered. Anesthesia consent signed with patient.  ASA Score: 2     Day of Surgery Review of History & Physical:    H&P update referred to the surgeon.         Ready For Surgery From Anesthesia Perspective.

## 2018-06-25 NOTE — NURSING
Update given to Dr Elias via phone of sve with increasing caput x 2 checks and remains at -1, fht's down to 50's with 2nd set of pushing, 02 and ivf bolus started with fht's now 120's, pitocin off since 1545, and will will turn q 30 min and use peanut ball. No new orders rec'd .

## 2018-06-25 NOTE — ANESTHESIA PROCEDURE NOTES
CSE    Patient location during procedure: OB  Start time: 6/25/2018 10:00 AM  Timeout: 6/25/2018 10:00 AM  End time: 6/25/2018 10:04 AM  Staffing  Anesthesiologist: ALLYSON DECKER  Performed: anesthesiologist   Preanesthetic Checklist  Completed: patient identified, site marked, surgical consent, pre-op evaluation, timeout performed, IV checked, risks and benefits discussed and monitors and equipment checked  CSE  Patient position: sitting  Prep: ChloraPrep and site prepped and draped  Patient monitoring: heart rate, continuous pulse ox and frequent blood pressure checks  Approach: midline  Spinal Needle  Needle type: pencil-tip   Needle gauge: 25 G  Needle length: 5 in  Epidural Needle  Injection technique: JAYLEEN air  Needle type: Tuohy   Needle gauge: 17 G  Needle length: 3.5 in  Needle insertion depth: 7 cm  Location: L4-5  Needle localization: anatomical landmarks  Catheter  Catheter type: Yhat  Catheter size: 19 G  Catheter at skin depth: 12 cm  Test dose: lidocaine 1.5% with Epi 1-to-200,000  Additional Documentation: negative aspiration for CSF, negative aspiration for heme and negative test dose  Assessment  Intrathecal Medications:  Bolus administered: 0.8 mL of 0.25 bupivacaine

## 2018-06-25 NOTE — PROGRESS NOTES
[facility]  Obstetrics & Gynecology  Progress Note    2018    33 y.o.  at 39w4d admitted for induction of labor secondary to maternal request  S/p cervidil x 1 dose overnight  Pitocin started this AM    Pt without major complaints  No vaginal bleeding or leakage of fluid  Active fetus  Wants epidural  AROM 2018 tolerated well with moderate clear fluid, no odor  No vaginal bleeding, active fetus     Temp:  [98.1 °F (36.7 °C)-98.4 °F (36.9 °C)] 98.1 °F (36.7 °C)  Pulse:  [58-90] 76  Resp:  [16-18] 18  BP: (101-148)/(57-97) 132/90    Physical Exam:   Constitutional: No distress.                             Abdomen:  Soft/NT   Cervix:  4/80%/-1, moderate clear fluid, no bleeding  Extremeties:  No cramping, claudication, trace edema, +2 distal pulses, symmetric    NST:  Cat 1   [place]:  q2-4min on 14 selam-units pitocin    Continue active mgt of labor  Titrate pitocin   Ampicillin for GBS   Epidural prn   Plan of care discussed, all questions answered, voiced understanding      Julio Cesar Elias MD

## 2018-06-26 LAB
BASOPHILS # BLD AUTO: 0.02 K/UL
BASOPHILS NFR BLD: 0.2 %
DIFFERENTIAL METHOD: ABNORMAL
EOSINOPHIL # BLD AUTO: 0.1 K/UL
EOSINOPHIL NFR BLD: 0.5 %
ERYTHROCYTE [DISTWIDTH] IN BLOOD BY AUTOMATED COUNT: 13.4 %
HCT VFR BLD AUTO: 33.3 %
HGB BLD-MCNC: 11.3 G/DL
LYMPHOCYTES # BLD AUTO: 1.9 K/UL
LYMPHOCYTES NFR BLD: 16.7 %
MCH RBC QN AUTO: 28.1 PG
MCHC RBC AUTO-ENTMCNC: 33.9 G/DL
MCV RBC AUTO: 83 FL
MONOCYTES # BLD AUTO: 0.8 K/UL
MONOCYTES NFR BLD: 7.5 %
NEUTROPHILS # BLD AUTO: 8.3 K/UL
NEUTROPHILS NFR BLD: 75 %
PLATELET # BLD AUTO: 172 K/UL
PMV BLD AUTO: 11.2 FL
RBC # BLD AUTO: 4.02 M/UL
WBC # BLD AUTO: 11.05 K/UL

## 2018-06-26 PROCEDURE — 85025 COMPLETE CBC W/AUTO DIFF WBC: CPT

## 2018-06-26 PROCEDURE — 25000003 PHARM REV CODE 250: Performed by: OBSTETRICS & GYNECOLOGY

## 2018-06-26 PROCEDURE — 11000001 HC ACUTE MED/SURG PRIVATE ROOM

## 2018-06-26 PROCEDURE — 36415 COLL VENOUS BLD VENIPUNCTURE: CPT

## 2018-06-26 RX ADMIN — IBUPROFEN 600 MG: 600 TABLET ORAL at 10:06

## 2018-06-26 NOTE — PLAN OF CARE
Problem: Patient Care Overview  Goal: Plan of Care Review  Outcome: Ongoing (interventions implemented as appropriate)  VSS. NAD. Ambulating and voiding. Pain well managed with prn pain meds. Declines breastfeeding at this time. Discussed POC, pain management, and breastfeeding. Pt verbalizes understanding.

## 2018-06-26 NOTE — PROGRESS NOTES
"[facility]    Obstetrics & Gynecology  Progress Note      Patient Name:  Pilar Hernandez  MRN:  [id number]  Admission Date:  6/24/2018  Hospital Length of Stay:  2  Attending Physician:  Julio Cesar Elias MD  Primary Care Provider:  Charly Robin Jr, MD    Subjective:     Date:  06/26/2018    Principal Problem:  39 weeks gestation of pregnancy    Hospital Course:  Post Partum Day # 1 - s/p vaginal delivery at 39w4d    Patient without major complaints  No acute events overnight  Denies dizziness, SOB, QUIÑONES, or CP  Pain well controlled  Lochia less than menses  Bottle/breast feeding   Breast non-tender, non-engorged  Ambulating, tolerating regular diet, and voiding without diffculty  Bonding well with baby    Objective:     Temp:  [97.3 °F (36.3 °C)-98.1 °F (36.7 °C)] 97.3 °F (36.3 °C)  Pulse:  [57-95] 87  Resp:  [16-20] 20  SpO2:  [86 %-100 %] 91 %  BP: (105-148)/(62-95) 132/88    /88 (BP Location: Right arm, Patient Position: Sitting)   Pulse 87   Temp 97.3 °F (36.3 °C) (Oral)   Resp 20   Ht 5' 8" (1.727 m)   Wt 93.4 kg (206 lb)   LMP 09/12/2017 (Exact Date)   SpO2 (!) 91%   Breastfeeding? Yes   BMI 31.32 kg/m²     Intake/Output Summary (Last 24 hours) at 06/26/18 1001  Last data filed at 06/26/18 0900   Gross per 24 hour   Intake             1220 ml   Output             2590 ml   Net            -1370 ml   Clear, helio urine    Physical Exam:   Constitutional: No distress.                             Alert and oriented x 3.   HEENT:  No scleral icterus. Neck supple. Moist mucus membranes.   LUNGS:  Clear to auscultation bilaterally.   HEART:  Regular rate and rhythm with physiologic heart sounds.   ABDOMEN:  Soft, non-tender, non-distended, no guarding, rigidity, or rebound with normoactive bowel sounds.   FUNDUS:  Firm, nontender below the umbilicus.   EXTREMITIES:  No cramping, claudication.  Trace edema LE. +2 distal pulses. Symmetric, full range of motion, negative " Vladimir.  NEUROLOGIC:  Grossly intact bilaterally. +2 DTR's.   PSYCH:  Mood and affect appropriate.   PERINEUM:  Intact with light lochia.    Labs:      Recent Results (from the past 336 hour(s))   CBC auto differential    Collection Time: 06/26/18  7:43 AM   Result Value Ref Range    WBC 11.05 3.90 - 12.70 K/uL    Hemoglobin 11.3 (L) 12.0 - 16.0 g/dL    Hematocrit 33.3 (L) 37.0 - 48.5 %    Platelets 172 150 - 350 K/uL   CBC with Auto Differential    Collection Time: 06/24/18  4:33 PM   Result Value Ref Range    WBC 5.46 3.90 - 12.70 K/uL    Hemoglobin 10.9 (L) 12.0 - 16.0 g/dL    Hematocrit 32.2 (L) 37.0 - 48.5 %    Platelets 176 150 - 350 K/uL     ABO:  O POS    Assessment:     Post-partum day # 1 - IUP at 39w4d s/p spontaneous vaginal delivery - progressing well    Plan:     Continue post-partum care  Review post-partum care instructions and precautions  Encourage ambulation  Encourage breast-feeding  Delivery findings, labs/studies, and expectations were discussed with pt.  All questions answered and pt voiced understanding.     Disposition:  As patient meets milestones, will plan to discharge tomorrow.      Julio Cesar Elias MD

## 2018-06-26 NOTE — ANESTHESIA POSTPROCEDURE EVALUATION
"Anesthesia Post Evaluation    Patient: Pilar Hernandez    Procedure(s) Performed: * No procedures listed *    Final Anesthesia Type: CSE  Patient location during evaluation: labor & delivery  Patient participation: Yes- Able to Participate  Level of consciousness: awake and alert and oriented  Post-procedure vital signs: reviewed and stable  Pain management: adequate  Airway patency: patent  PONV status at discharge: No PONV  Anesthetic complications: no      Cardiovascular status: blood pressure returned to baseline and hemodynamically stable  Respiratory status: unassisted, spontaneous ventilation and room air  Hydration status: euvolemic  Follow-up not needed.        Visit Vitals  /87   Pulse 73   Temp 36.5 °C (97.7 °F)   Resp 18   Ht 5' 8" (1.727 m)   Wt 93.4 kg (206 lb)   LMP 09/12/2017 (Exact Date)   SpO2 (!) 91%   Breastfeeding? Yes   BMI 31.32 kg/m²       Pain/Tash Score: No Data Recorded      "

## 2018-06-26 NOTE — LACTATION NOTE
This note was copied from a baby's chart.  Instructed on Baby led bottle feeding.  Discussed:   Wash Hands   Hunger cues - hands to mouth, bending arms and legs toward the body, sucking noises, puckered lips and rooting/searching for the nipple   Method of feeding the baby  o always hold the baby upright, never prop a bottle  o brush the nipple across babys upper lip and wait to open  o hold bottle in a flat position, only partly full  o allow baby to pause and take breaks; burp as needed  o feeding lasts about 15 - 20 minutes  o Stop feeding when fullness cues are present  o Fullness cues - sucking slows or stops, relaxed hands and arms, pushes away, falls asleep  Pt verbalized understanding and provided appropriate recall.Instructed on the cleaning and sterilization of equipment for formula preparation:   Clean and disinfect working surface   Wash hands, arms and under fingernails with soap and water; dry using a clean cloth   Use bottle/nipple brush to wash all bottles, nipples, rings, caps and preparation utensils in hot soapy water before initial use and rinse   Sterilize all parts/utensils in boiling water or with a sterilization device prior to use   Continue to wash all parts with warm soapy water and rinse after each use and sterilize daily  Pt verbalized understanding and provided appropriate recall.Instructed on the cleaning and sterilization of equipment for formula preparation:   Clean and disinfect working surface   Wash hands, arms and under fingernails with soap and water; dry using a clean cloth   Use bottle/nipple brush to wash all bottles, nipples, rings, caps and preparation utensils in hot soapy water before initial use and rinse   Sterilize all parts/utensils in boiling water or with a sterilization device prior to use   Continue to wash all parts with warm soapy water and rinse after each use and sterilize daily  Pt verbalized understanding and provided appropriate recall.

## 2018-06-26 NOTE — PLAN OF CARE
Problem: Patient Care Overview  Goal: Plan of Care Review  Outcome: Ongoing (interventions implemented as appropriate)  Personal care discussed and demonstrated.   Ambulates without difficulty.

## 2018-06-26 NOTE — LACTATION NOTE
"Mother wants to try breast feeding again -baby having difficulty with latch -mother with dense areola and nipples that retract -offered use of nipple shield to assit with latch and mother refusing -also offered a pump but refused -asking to "just give formula" -reinforced risks of formula but mother states her plan is to continue formula feed baby     "

## 2018-06-26 NOTE — L&D DELIVERY NOTE
[facility]   Delivery Summary  Obstetrics & Gynecology       Date of Delivery:  2018        Preoperative Diagnosis:    [place] gestation at 39w4d in active labor  GBS positive    Postoperative Diagnosis:    [place] gestation at 39w4d s/p spontaneous vaginal delivery  Live infant  GBS positive  Procedure Performed:    Vaginal delivery    Indication (HPI):     Please see History & Physical for complete details.  In brief, this is a 33 y.o.  at 39w4d gestation admitted for induction of labor secondary to maternal request.  The induction was initiated with Cervidil x 1 doses followed by pitocin.  Pt progressed well through the active phase of labor and delivered a viable infant.  Maternal and fetal status was overall reassuring throughout the labor course.  Pt was GBS positive and received antibiotics for GBS prophylaxis shortly after arrival. AROM 2018 ~8:30AM with moderate, clear fluid, no odor and had no intrapartum fever.  Pt was informed of the risks, benefits, alternatives and possible complications to a vaginal delivery.  Informed consent was obtained for delivery and blood transfusion.    Anesthesia:  Epidural     EBL:  300 mL with no replacements    Specimens:  Cord blood      Findings, Infant & Apgars:      Live female infant, APGAR 1 min: 8, 5 min: 9, transfer to well baby  Weight pending at time note   AROM 2018 ~8:30AM with moderate, clear fluid, no odor, no intrapartum fever   Second degree midline repaired    Complications:  None    Delivery Summary:      Vaginal delivery of viable infant.  Infant delivered after 5 minutes of pushing.  Nuchal cord x 1 reduced.  No shoulder dystocia.  2nd degree laceration repaired with 2-0 chronic in usual fashion.  The infant was vigorous with a strong cry.  Cord blood was collected.   The placenta delivered spontaneously intact with three vessel cord.    Uterine massage and 20 units of Pitocin IV were given until the fundus  was firm.    Hemostasis was noted.    No sponges were left in the vagina.   Sponge, lap, needle, and instrument counts were correct time two.   Mother and baby were bonding well at the end of the delivery both in stable condition.   Findings and expectations were discussed with the patient.   All of pt questions were answered to her satisfaction, pt voiced understanding.      Julio Cesar Elias MD

## 2018-06-27 ENCOUNTER — TELEPHONE (OUTPATIENT)
Dept: OBSTETRICS AND GYNECOLOGY | Facility: CLINIC | Age: 33
End: 2018-06-27

## 2018-06-27 VITALS
WEIGHT: 206 LBS | SYSTOLIC BLOOD PRESSURE: 135 MMHG | DIASTOLIC BLOOD PRESSURE: 84 MMHG | TEMPERATURE: 98 F | OXYGEN SATURATION: 91 % | BODY MASS INDEX: 31.22 KG/M2 | RESPIRATION RATE: 19 BRPM | HEIGHT: 68 IN | HEART RATE: 80 BPM

## 2018-06-27 RX ORDER — OXYCODONE AND ACETAMINOPHEN 5; 325 MG/1; MG/1
1 TABLET ORAL EVERY 4 HOURS PRN
Qty: 15 TABLET | Refills: 0 | Status: SHIPPED | OUTPATIENT
Start: 2018-06-27 | End: 2018-11-29

## 2018-06-27 RX ORDER — IBUPROFEN 600 MG/1
600 TABLET ORAL EVERY 6 HOURS PRN
Qty: 60 TABLET | Refills: 0 | Status: SHIPPED | OUTPATIENT
Start: 2018-06-27 | End: 2018-09-13

## 2018-06-27 NOTE — PLAN OF CARE
Problem: Patient Care Overview  Goal: Plan of Care Review  Pt voiding urine and passing flatus. VSS. No acute distress noted. Vaginal bleed scant. Fundus firm. Bonding with infant appropriately. Encouraged to ambulate. Pain is minimal as per pt. tucs pads at bedside.

## 2018-06-27 NOTE — TELEPHONE ENCOUNTER
----- Message from Lucy Lagunas sent at 6/27/2018 11:01 AM CDT -----  Contact: Self  Pt calling to speak to a nurse regarding getting discharged. [phone].

## 2018-06-27 NOTE — DISCHARGE SUMMARY
[facility]    Discharge Summary  Obstetrics & Gynecology      Patient Name:  Pilar Hernandez  MRN:  [id number]  Admission Date:  2018  Discharge Date:  2018  Hospital Length of Stay:  3  Attending Physician:  Julio Cesar Elias MD  Discharging Physician:  Julio Cesar Elias MD  Primary Care Provider:  Charly Robin Jr, MD  Date of Delivery:  2018    Admitting Diagnosis:       [place] gestation at 39w4d  Induction of labor secondary to maternal request     Discharge Diagnosis:    [place] gestation at term s/p spontaneous vaginal delivery     Procedure performed:      Vaginal delivery    Brief Hospital Course:      Please see History & Physical for complete details.  In brief, this is a 33 y.o.  at 39w4d gestation admitted for induction of labor secondary to maternal request.  The induction was initiated with Cervidil x 1 doses followed by pitocin.  Pt progressed well through the active phase of labor and delivered a viable infant.  Maternal and fetal status was overall reassuring throughout the labor course.  See delivery note for further details.  Following delivery, pt was transferred to mother baby unit for routine post-partum care.  Pt had a fairly uncomplicated postpartum course.  Prior to discharge, she was without major complaints.  Pt pain was well controlled.  Pt was ambulating, tolerating a regular diet, voiding without difficulty, and bonding well with baby.  Lochia was light.  Vital signs where physiologic and stable.  Physical exam showed no acute findings.  Pt had satisfied routine postpartum discharge criteria and expressed the desire to be discharge from the hospital.     Pertinent Labs or Studies:      Recent Results (from the past 336 hour(s))   CBC auto differential    Collection Time: 18  7:43 AM   Result Value Ref Range    WBC 11.05 3.90 - 12.70 K/uL    Hemoglobin 11.3 (L) 12.0 - 16.0 g/dL    Hematocrit 33.3 (L) 37.0 - 48.5 %    Platelets  "172 150 - 350 K/uL   CBC with Auto Differential    Collection Time: 06/24/18  4:33 PM   Result Value Ref Range    WBC 5.46 3.90 - 12.70 K/uL    Hemoglobin 10.9 (L) 12.0 - 16.0 g/dL    Hematocrit 32.2 (L) 37.0 - 48.5 %    Platelets 176 150 - 350 K/uL     ABO:  O POS    Discharge Exam:      Temp:  [97.6 °F (36.4 °C)-97.8 °F (36.6 °C)] 97.7 °F (36.5 °C)  Pulse:  [58-81] 80  Resp:  [18-20] 19  BP: (128-153)/(77-96) 135/84    /84 (BP Location: Right arm, Patient Position: Lying)   Pulse 80   Temp 97.7 °F (36.5 °C) (Oral)   Resp 19   Ht 5' 8" (1.727 m)   Wt 93.4 kg (206 lb)   LMP 09/12/2017 (Exact Date)   SpO2 (!) 91%   Breastfeeding? Yes   BMI 31.32 kg/m²     GENERAL:  No acute distress. Alert and oriented x 3.   HEENT:  Normocephalic. EOMI. No scleral icterus. Neck supple. Moist mucus membranes.   LUNGS:  Clear to auscultation bilaterally.   HEART:  Regular rate and rhythm with physiologic heart sounds.   ABDOMEN:  Soft, non-tender, non-distended, no guarding, rigidity, or rebound.   FUNDUS:  Firm, nontender below the umbilicus.   EXTREMITIES:  No cramping, claudication.  Trace edema. Good skin turgor. +2 distal pulses, symmetric. Full range of motion.   NEUROLOGIC:  Grossly intact bilaterally.   PSYCH:  Mood and affect appropriate.   PERINEUM:  Intact with light lochia.    Discharge Condition:  Stable      Discharge Disposition:  Home       Discharge Medications:     Resume prenatal vitamins 1 tab po qday  Resume fergon 325 mg 1 tab po bid  Resume colace 100 mg 1 tab po bid prn constipation  Motrin 600 mg 1 tab po q6h prn pain, disp #60, refill 0  Percocet 5/325 mg 1 tab po q4-6h prn breakthrough pain, disp #15    Discharge Activites:      Resume activities as tolerated with adequate rest  Pelvic rest for 6 weeks   No heavy lifting greater 10 lbs or strenuous activities   Showers only  Wound care instructions and precautions given   Do NOT driving and operating machinery while taking narcotics or other " sedative medications, pt voiced understanding.    Discharge Diet:  Regular diet    Discharge Instructions:      Pt was instructed to contact the clinic or emergency department for any concerns including but not limited to an increase in her lochia, abdominal pain, foul vaginal discharge, weakness, decrease activity level, decrease appetite, feeling sad or hopeless, inability to care for her baby, breast pain or infection, difficulty with voiding or having bowel movements, perineal pain or breakdown, wound breakdown, fever >100.4 or abnormal vital signs, shortness of breath, chest pain, dizziness or feeling faint, pain or swelling in her extremities, headaches not relieved with rest and Tylenol, vision changes, seizure activities, abnormal bleeding/bruising, or any other health concerns.  Post-partum care instructions and precautions, labs/studies, clinical/delivery findings, and hospital course reviewed with the patient prior to discharge.  All of her questions were answered to her satisfaction.  Keep blood pressure log at home and call office if bp > 140/90.      Follow-up Visit:  6 weeks for postpartum visit, or sooner if any problems.       Julio Cesar Elias MD

## 2018-06-27 NOTE — DISCHARGE INSTRUCTIONS
After a Vaginal Birth  After having a baby, your body may be very tired. It can take time to recover from a vaginal delivery. You may stay in the hospital or birth center from 1 to 4 days. In some cases, you may be able to go home the same day.    Right after the delivery  Your temperature and blood pressure will be taken until they are stable. A nurse or other healthcare provider will observe you as you rest. You may have afterbirth pains. These are cramps caused by the uterus shrinking. Sanitary pads are used to soak up the discharge of the uterine lining. To make sure that you arent bleeding too much, the pad will be checked. And the firmness of your uterus will be checked. To do this, a nurse will gently push down on your stomach. If you had anesthesia, youll be watched closely until you can feel and move your toes. If you have perineal pain (pain between the vagina and anus), an ice pack can help.   care  While still in the hospital or birth center, youll learn how to hold and feed your baby. You will also be given instructions on how to care for your baby. This includes bathing and feeding.   Preparing to go home  You may be anxious to go home as soon as possible. Before you and your baby go home, a healthcare provider will check to be sure you are healthy enough to take care of your baby and yourself. Youre ready to go home when:  · You can walk to the bathroom and use the bathroom without help.  · You can eat solid food and swallow pills (if needed).  · You have no sign of infection or other health problems, including fever.   · You have adequate pain control.  · Your bleeding isn't excessive.  · You are able to care for your  and are emotionally stable.  Before leaving the hospital or birth center, youll be given written instructions for home self-care after vaginal delivery. Be sure to follow these instructions carefully. If you have questions or concerns, talk about them now.  If you  have stitches  You may have received stitches in the skin near your vagina. The stitches might have closed an episiotomy (an incision that enlarges the opening of the vagina). Or you may have needed stitches to repair torn skin. Either way, your stitches should dissolve within weeks. Until then, you can help reduce discomfort, aid healing, and reduce your risk of infection by keeping the stitches clean. These tips can help:  · Gently wipe from front to back after you urinate or have a bowel movement.  · After wiping, spray warm water on the area. Or you can have a sitz bath. This means sitting in a tub with a few inches of water in it. Then pat the area dry or use a hairdryer on a cool setting.  · Do not use soap or any solution except water on the area.  · You can take a shower unless told not to.  · Change sanitary pads at least every 2 to 4 hours.  · Place cold or heat packs on the area as directed by your healthcare providers or nurses. Keep a thin towel between the pack and your skin.  · Sit on firm seats so the stitches pull less.   follow-up  Schedule a  follow-up exam with your healthcare provider for about 6 weeks after delivery. During this exam, your uterus and vaginal area will be checked. Contact your healthcare provider if you think you or your baby are having any problems.  When to call your healthcare provider  Call your health care provider right away if you have:  · A fever of 100.4°F (38.0°C) or higher  · Bleeding that requires a new sanitary pad after an hour, or large blood clots  · Pain in your vagina that gets worse and isn't relieved with medicine  · Swelling, discharge, or increased pain from vaginal tear or episiotomy  · Burning, pain, red streaks, or lumpy areas in your breasts that may be accompanied by flu-like symptoms  · Cracks, blisters, or blood on your nipples  · Burning or pain when you urinate  · Nausea or vomiting  · Dizziness or fainting  · Feelings of extreme  sadness or anxiety, or a feeling that you dont want to be with your baby  · Abdominal pain that isnt relieved with medicine  · Vaginal discharge that has a bad odor  · No bowel movement for 5 days  · Painful urination, orinability to control urination  · Redness, warmth, or pain in the lower leg  · Chest pain   Date Last Reviewed: 8/2/2015  © 655[employer]180 [employer]. [address] [place] [address], [place] [address]. All rights reserved. This information is not intended as a substitute for professional medical care. Always follow your healthcare professional's instructions.

## 2018-06-27 NOTE — PROGRESS NOTES
"[facility]    Obstetrics & Gynecology  Progress Note      Patient Name:  Pilar Hernandez  MRN:  [id number]  Admission Date:  6/24/2018  Hospital Length of Stay:  3  Attending Physician:  Julio Cesar Elias MD  Primary Care Provider:  Charly Robin Jr, MD    Subjective:     Date:  06/27/2018    Principal Problem:  39 weeks gestation of pregnancy    Hospital Course:  Post Partum Day # 2 - s/p vaginal delivery at 39w4d    No major complaints today.  No acute events overnight  Denies dizziness, SOB, QUIÑONES, or CP  Requesting to go home  Pain well controlled  Lochia less than menses  Bottle/breast feeding   Breast non-tender, non-engorged  Ambulating, tolerating regular diet, and voiding without diffculty  Bonding well with baby    Objective:     Temp:  [96.9 °F (36.1 °C)-97.8 °F (36.6 °C)] 97.7 °F (36.5 °C)  Pulse:  [58-81] 80  Resp:  [18-20] 19  BP: (128-153)/(76-96) 135/84    /84 (BP Location: Right arm, Patient Position: Lying)   Pulse 80   Temp 97.7 °F (36.5 °C) (Oral)   Resp 19   Ht 5' 8" (1.727 m)   Wt 93.4 kg (206 lb)   LMP 09/12/2017 (Exact Date)   SpO2 (!) 91%   Breastfeeding? Yes   BMI 31.32 kg/m²   Clear, helio urine    Physical Exam:   Constitutional: No distress.                             Alert and oriented x 3.   HEENT:  No scleral icterus. Neck supple. Moist mucus membranes.   LUNGS:  Clear to auscultation bilaterally.   HEART:  Regular rate and rhythm with physiologic heart sounds.   ABDOMEN:  Soft, non-tender, non-distended, no guarding, rigidity, or rebound with normoactive bowel sounds.   FUNDUS:  Firm, nontender below the umbilicus.   EXTREMITIES:  No cramping, claudication.  Trace edema LE. +2 distal pulses. Symmetric, full range of motion, negative Gilbert.  NEUROLOGIC:  Grossly intact bilaterally. +2 DTR's.   PSYCH:  Mood and affect appropriate.   PERINEUM:  Intact with light lochia.    Assessment:     Post-partum day # 2 - IUP at 39w4d s/p spontaneous " vaginal delivery - progressing well    Plan:     Plan for discharge today.     Reviewed discharge medications.  Pt was instructed to avoid driving or operate heavy machinery while taking narcotics or other medications with sedative properties.    Post-partum care instructions and precautions, clinical/delivery findings, and hospital course reviewed with pt prior to discharge.  All of her questions were answered to her satisfaction.  Pt voiced understanding and agrees with discharge.    Pt advised to keep BP log at home TID and call office if BP > 140/90.  HTN precautions.    Return to clinic in 6 weeks for post-partum visit or sooner if any concerns.  Go to ER for any emergencies.      Julio Cesar Elias MD

## 2018-06-27 NOTE — TELEPHONE ENCOUNTER
6 weeks pp scheduled. Pt advised that  will be over to discharge her once he is finished with his morning clinic.

## 2018-08-06 ENCOUNTER — POSTPARTUM VISIT (OUTPATIENT)
Dept: OBSTETRICS AND GYNECOLOGY | Facility: CLINIC | Age: 33
End: 2018-08-06
Payer: COMMERCIAL

## 2018-08-06 VITALS
DIASTOLIC BLOOD PRESSURE: 75 MMHG | WEIGHT: 203.81 LBS | SYSTOLIC BLOOD PRESSURE: 115 MMHG | HEIGHT: 68 IN | BODY MASS INDEX: 30.89 KG/M2

## 2018-08-06 PROCEDURE — 99499 UNLISTED E&M SERVICE: CPT | Mod: S$GLB,,, | Performed by: OBSTETRICS & GYNECOLOGY

## 2018-08-06 PROCEDURE — 99999 PR PBB SHADOW E&M-EST. PATIENT-LVL III: CPT | Mod: PBBFAC,,, | Performed by: OBSTETRICS & GYNECOLOGY

## 2018-08-06 NOTE — PROGRESS NOTES
"[facility]  Ambulatory Clinic  Obstetrics & Gynecology    Date of Visit:  2018    Chief Complaint:  Post-partum visit    Subjective:      Pilar Hernandez is a 33 y.o.  who is s/p spontaneous vaginal delivery at term on 2018 here for post-partum visit.  Pt has no major complaints today.  Pt had an uneventful post-partum hospital course and has been doing well since delivery.  Pt reports baby is doing well and she is breast/bottle feeding without difficulty.  Her lochia resolved.  Pt denies any abdominal/pelvic pain, fevers, abnormal vaginal discharge, symptoms of post-partum blues or depression, breast complaints, GI or urinary compliants.  Pt is undecided on birth control at this time, will use condoms.    Review of Systems:      CONSTITUTIONAL:  No fever, chills, weakness, fatigue, decreased activity  HEENT: No headaches, vision changes  LUNGS:  No shortness of breath  HEART:  No palpitations, chest pain, abnormal swelling  BREAST:  No lump, pain, redness, skin changes  GI:  No nausea, vomiting, diarrhea, constipation, abdomen pain, blood in stool  URINARY:  No dysuria, hematuria, frequency  SKIN:  No rash, bruising  NEURO:  No headache, weakness  PSYCH:  No anxiety, depression, suicidal or homicidal ideations    Objective:     /75   Ht 5' 8" (1.727 m)   Wt 92.4 kg (203 lb 13 oz)   LMP 2017 (Exact Date)   BMI 30.99 kg/m²      GENERAL:  NAD, A&Ox3, well nourished.  HEENT:  NCAT,moist mucus membranes, neck supple.  BREAST:  Symmetric, non-tender, no abnormal masses, skin changes, or discharge.  LUNGS:  CTA-B.  HEART:  RRR with physiologic heart sounds.  ABDOMEN:  Soft, non-tender, non-distended without any guarding, rigidity or rebound. Normoactive bowel sounds.    EXT:  Symmetric. No cramping, claudication, or edema. +2 distal pulses. FROM.  SKIN:  No rashes, good turgor & capillary refill.  NEURO:  Grossly intact bilaterally. +2 DTR.  PSYCH:  Mood & " affect appropriate.       PELVIC:  Normal female external genitalia without any obvious lesions.  Perinuem intact.  Adequate perineal body.  Normal urethral meatus.  No gross lymphadenopathy.   Vagina:  Intact with good support, lochia resolved.     Cervix:  No cervical motion tenderness, discharge, or obvious lesions.    Uterus:  Small, non-tender, normal contour.    Adnexa:  No masses, non-tender.    Rectal:  Patient declined.  No obvious external lesions.     Chaperone present for exam.    Laboratory Data:     Lab Results   Component Value Date    WBC 11.05 2018    HGB 11.3 (L) 2018    HCT 33.3 (L) 2018    MCV 83 2018     2018     O POS    Assessment:     33 y.o.  s/p  at term - doing well post-partum    Plan:    Post-partum care instructions and precautions reviewed.  Pelvic rest x 6 wks post-partum.  Continue prenatal vitamins, fergon and colace.       F/u with PCP for health maintenance.  Encourage healthy lifestyle modifications.    Return early  for well woman GYN exam, or sooner as needed, pt advised to call and schedule.  Pt voiced understanding.       Julio Cesar Elias MD

## 2018-09-13 ENCOUNTER — OFFICE VISIT (OUTPATIENT)
Dept: OBSTETRICS AND GYNECOLOGY | Facility: CLINIC | Age: 33
End: 2018-09-13
Payer: COMMERCIAL

## 2018-09-13 VITALS
SYSTOLIC BLOOD PRESSURE: 136 MMHG | WEIGHT: 202 LBS | BODY MASS INDEX: 30.62 KG/M2 | DIASTOLIC BLOOD PRESSURE: 83 MMHG | HEIGHT: 68 IN

## 2018-09-13 DIAGNOSIS — Z32.00 ENCOUNTER FOR CONFIRMATION OF PREGNANCY TEST RESULT WITH PHYSICAL EXAMINATION: Primary | ICD-10-CM

## 2018-09-13 DIAGNOSIS — Z32.01 POSITIVE PREGNANCY TEST: ICD-10-CM

## 2018-09-13 LAB
B-HCG UR QL: POSITIVE
CTP QC/QA: YES

## 2018-09-13 PROCEDURE — 99213 OFFICE O/P EST LOW 20 MIN: CPT | Mod: S$GLB,,, | Performed by: OBSTETRICS & GYNECOLOGY

## 2018-09-13 PROCEDURE — 3008F BODY MASS INDEX DOCD: CPT | Mod: CPTII,S$GLB,, | Performed by: OBSTETRICS & GYNECOLOGY

## 2018-09-13 PROCEDURE — 81025 URINE PREGNANCY TEST: CPT | Mod: S$GLB,,, | Performed by: OBSTETRICS & GYNECOLOGY

## 2018-09-13 PROCEDURE — 99999 PR PBB SHADOW E&M-EST. PATIENT-LVL III: CPT | Mod: PBBFAC,,, | Performed by: OBSTETRICS & GYNECOLOGY

## 2018-09-14 ENCOUNTER — TELEPHONE (OUTPATIENT)
Dept: OBSTETRICS AND GYNECOLOGY | Facility: CLINIC | Age: 33
End: 2018-09-14

## 2018-09-14 NOTE — TELEPHONE ENCOUNTER
9/14/18 @ 1456 (luiza)  CALL ATTEMPT TO PT UNSUCCESSFUL , MESSAGE LEFT FOR PT TO RETURN CALL TO OFFICE CONCERNING PICKING UP HER LETTER FOR RETURN TO WORK

## 2018-09-14 NOTE — TELEPHONE ENCOUNTER
----- Message from Nuvia Pennington sent at 9/14/2018  1:43 PM CDT -----  Contact: Self/[phone]  Patient called to follow up on a return to work slip. Thank you.

## 2018-09-14 NOTE — TELEPHONE ENCOUNTER
----- Message from Lucy Lagunas sent at 9/14/2018  8:05 AM CDT -----  Contact: Self   Pt requesting a letter to return back to work. [phone]. Pt would like it to be faxed to her job. Fax # [phone]

## 2018-09-14 NOTE — TELEPHONE ENCOUNTER
Pt requesting a letter to return to work today. Pt informed I would route a message to Dr. Elias for approval and contact her with his response.

## 2018-09-19 NOTE — PROGRESS NOTES
[facility]  Ambulatory Clinic  Obstetrics & Gynecology    Visit Date:  2018     Chief Complaint:  Missed my period    History of Present Illness:      Pilar Hernandez is a 33 y.o. , UPT positive today, here with c/o missed period.  Patient's last menstrual period was 2018.  Pt has no major complaints today and denies any vaginal bleeding, discharge, pain, GI/ compliants.    Pt last pregnancy was overall uneventful.  Pt reports overall good health.    Past Medical History:      None      Past Surgical History:     Vertical sleeve - 2013    Medications:     Prenatal vitamins     Allergies:     NKDA      Obstetric History:          T1      L1     SAB0   TAB0   Ectopic0   Multiple0   Live Births1       # Outcome Date GA Lbr Urban/2nd Weight Sex Delivery Anes PTL Lv   1 Term 18 39w4d / 03:38 3.24 kg (7 lb 2.3 oz) F Vag-Spont EPI N NICOLE      Name: BERNARDO, LEANA CASSIDY      Apgar1:  8                Apgar5: 9     Gynecologic History:      Denies recent/active STI  Denies history abnormal Pap     Social History:      Denies tobacco, alcohol or illicit drug use  Current partner is father of baby  Denies domestic abuse     Family History:     Denies congenital anomalies, inherited syndromes, fetal aneuploidy    Review of Systems:      Constitutional:  No fever, fatigue  HENT:  No congestion, hearing changes  Eyes:  No visual disturbance  Respiratory:  No cough, shortness of breath  Cardiovascular:  No chest pain, leg swelling  Breast:  No lump, pain, nipple discharge, redness, skin changes  Gastrointestinal:  No abdominal pain, constipation, blood in stool   Genitourinary:  No dysuria, frequency  Endocrine:  No heat or cold intolerance  Musculoskeletal:  No back pain, arthralgias  Skin:  No rash, jaundice  Neurological:  No dizziness, weakness, headaches  Psychiatric/Behavioral:  No sleep disturbance, dysphoric mood     Physical Exam:     /83   Ht  "5' 8" (1.727 m)   Wt 91.6 kg (202 lb)   LMP 2018   Breastfeeding? No   BMI 30.71 kg/m²     GENERAL:  NAD. Well-nourished. A&Ox3.  HEENT:  NCAT, EOMI, moist mucus membranes, neck supple w/o masses.  BREAST:  Symmetric, no obvious masses, adenopathy, skin changes or nipple discharge.  LUNGS:  CTA-B.  HEART:  RRR, physiologic heart sounds.  ABDOMEN:  Soft, non-tender. Normoactive BS.  No obvious organomegaly.   EXT:  Symmetric w/o cramping, claudication, or edema. +2 distal pulses. FROM.  SKIN:  No rashes  NEURO:  CN II - XII grossly intact bilaterally. +2 DTR.  PSYCH:  Mood & affect appropriate.     PELVIC:  NFEG no lesion. No vaginal or cervical lesion. No bleeding or discharge. No CMT. Uterus and ovaries small, NT. Wet prep negative. Declined rectal exam. No obvious external lesions.    Chaperone present for exam.    Assessment:     33 y.o. , UPT positive, LMP 2018, here for confirmation of pregnancy    Plan:    We discussed principles of prenatal care, weight gain goals, dietary/lifestyle modifications, pregnancy care instructions and precautions.    Prenatal educational material d/w pt.  Continue prenatal vitamins.    F/u in 2 weeks for prenatal visit.    All questions answered, pt voiced understanding.      Julio Cesar Elias MD    "

## 2018-09-26 ENCOUNTER — INITIAL PRENATAL (OUTPATIENT)
Dept: OBSTETRICS AND GYNECOLOGY | Facility: CLINIC | Age: 33
End: 2018-09-26
Payer: COMMERCIAL

## 2018-09-26 ENCOUNTER — LAB VISIT (OUTPATIENT)
Dept: LAB | Facility: HOSPITAL | Age: 33
End: 2018-09-26
Attending: OBSTETRICS & GYNECOLOGY
Payer: COMMERCIAL

## 2018-09-26 VITALS
SYSTOLIC BLOOD PRESSURE: 127 MMHG | WEIGHT: 202 LBS | DIASTOLIC BLOOD PRESSURE: 84 MMHG | HEART RATE: 70 BPM | BODY MASS INDEX: 30.71 KG/M2

## 2018-09-26 DIAGNOSIS — Z3A.09 9 WEEKS GESTATION OF PREGNANCY: ICD-10-CM

## 2018-09-26 DIAGNOSIS — Z3A.09 9 WEEKS GESTATION OF PREGNANCY: Primary | ICD-10-CM

## 2018-09-26 DIAGNOSIS — Z36.89 ENCOUNTER TO ESTABLISH GESTATIONAL AGE USING ULTRASOUND: ICD-10-CM

## 2018-09-26 PROBLEM — O99.842 PREGNANCY COMPLICATED BY PREVIOUS GASTRIC BYPASS, ANTEPARTUM, SECOND TRIMESTER: Status: RESOLVED | Noted: 2018-03-12 | Resolved: 2018-09-26

## 2018-09-26 LAB
ABO + RH BLD: NORMAL
ALBUMIN SERPL BCP-MCNC: 3.8 G/DL
ALP SERPL-CCNC: 53 U/L
ALT SERPL W/O P-5'-P-CCNC: 7 U/L
ANION GAP SERPL CALC-SCNC: 6 MMOL/L
AST SERPL-CCNC: 11 U/L
BASOPHILS # BLD AUTO: 0.02 K/UL
BASOPHILS NFR BLD: 0.4 %
BILIRUB SERPL-MCNC: 0.4 MG/DL
BLD GP AB SCN CELLS X3 SERPL QL: NORMAL
BUN SERPL-MCNC: 7 MG/DL
CALCIUM SERPL-MCNC: 9.4 MG/DL
CHLORIDE SERPL-SCNC: 107 MMOL/L
CO2 SERPL-SCNC: 25 MMOL/L
CREAT SERPL-MCNC: 0.7 MG/DL
DIFFERENTIAL METHOD: ABNORMAL
EOSINOPHIL # BLD AUTO: 0.1 K/UL
EOSINOPHIL NFR BLD: 1 %
ERYTHROCYTE [DISTWIDTH] IN BLOOD BY AUTOMATED COUNT: 14 %
EST. GFR  (AFRICAN AMERICAN): >60 ML/MIN/1.73 M^2
EST. GFR  (NON AFRICAN AMERICAN): >60 ML/MIN/1.73 M^2
ESTIMATED AVG GLUCOSE: 94 MG/DL
GLUCOSE SERPL-MCNC: 79 MG/DL
HBA1C MFR BLD HPLC: 4.9 %
HCT VFR BLD AUTO: 36.8 %
HGB BLD-MCNC: 12.5 G/DL
LYMPHOCYTES # BLD AUTO: 1.6 K/UL
LYMPHOCYTES NFR BLD: 33.1 %
MCH RBC QN AUTO: 27.8 PG
MCHC RBC AUTO-ENTMCNC: 34 G/DL
MCV RBC AUTO: 82 FL
MONOCYTES # BLD AUTO: 0.2 K/UL
MONOCYTES NFR BLD: 4.1 %
NEUTROPHILS # BLD AUTO: 3 K/UL
NEUTROPHILS NFR BLD: 61.4 %
PLATELET # BLD AUTO: 185 K/UL
PMV BLD AUTO: 10.2 FL
POTASSIUM SERPL-SCNC: 4.7 MMOL/L
PROT SERPL-MCNC: 7.5 G/DL
RBC # BLD AUTO: 4.5 M/UL
SODIUM SERPL-SCNC: 138 MMOL/L
WBC # BLD AUTO: 4.87 K/UL

## 2018-09-26 PROCEDURE — 36415 COLL VENOUS BLD VENIPUNCTURE: CPT

## 2018-09-26 PROCEDURE — 86803 HEPATITIS C AB TEST: CPT

## 2018-09-26 PROCEDURE — 85025 COMPLETE CBC W/AUTO DIFF WBC: CPT

## 2018-09-26 PROCEDURE — 86850 RBC ANTIBODY SCREEN: CPT

## 2018-09-26 PROCEDURE — 86762 RUBELLA ANTIBODY: CPT

## 2018-09-26 PROCEDURE — 80053 COMPREHEN METABOLIC PANEL: CPT

## 2018-09-26 PROCEDURE — 76801 OB US < 14 WKS SINGLE FETUS: CPT | Mod: S$GLB,,, | Performed by: OBSTETRICS & GYNECOLOGY

## 2018-09-26 PROCEDURE — 83021 HEMOGLOBIN CHROMOTOGRAPHY: CPT

## 2018-09-26 PROCEDURE — 87491 CHLMYD TRACH DNA AMP PROBE: CPT

## 2018-09-26 PROCEDURE — 0500F INITIAL PRENATAL CARE VISIT: CPT | Mod: S$GLB,,, | Performed by: OBSTETRICS & GYNECOLOGY

## 2018-09-26 PROCEDURE — 83036 HEMOGLOBIN GLYCOSYLATED A1C: CPT

## 2018-09-26 PROCEDURE — 87086 URINE CULTURE/COLONY COUNT: CPT

## 2018-09-26 PROCEDURE — 99999 PR PBB SHADOW E&M-EST. PATIENT-LVL III: CPT | Mod: PBBFAC,,, | Performed by: OBSTETRICS & GYNECOLOGY

## 2018-09-26 PROCEDURE — 87340 HEPATITIS B SURFACE AG IA: CPT

## 2018-09-26 PROCEDURE — 86592 SYPHILIS TEST NON-TREP QUAL: CPT

## 2018-09-26 PROCEDURE — 86703 HIV-1/HIV-2 1 RESULT ANTBDY: CPT

## 2018-09-26 NOTE — PROGRESS NOTES
[facility]  Ambulatory Clinic  Obstetrics & Gynecology    Visit Date:  2018     Chief Complaint:  Missed my period    History of Present Illness:      Pilar Hernandez is a 33 y.o. , UPT positive today, here with c/o missed period.  Patient's last menstrual period was 2018.  Pt has no major complaints today and denies any vaginal bleeding, discharge, pain, GI/ compliants.    Pt last pregnancy was overall uneventful.  Pt reports overall good health.    Past Medical History:      None      Past Surgical History:     Vertical sleeve - 2013    Medications:     Prenatal vitamins     Allergies:     NKDA      Obstetric History:          T1      L1     SAB0   TAB0   Ectopic0   Multiple0   Live Births1       # Outcome Date GA Lbr Urban/2nd Weight Sex Delivery Anes PTL Lv   1 Term 18 39w4d / 03:38 3.24 kg (7 lb 2.3 oz) F Vag-Spont EPI N NICOLE      Name: BERNARDO, LEANA CASSIDY      Apgar1:  8                Apgar5: 9     Gynecologic History:      Denies recent/active STI  Denies history abnormal Pap     Social History:      Denies tobacco, alcohol or illicit drug use  Current partner is father of baby  Denies domestic abuse     Family History:     Denies congenital anomalies, inherited syndromes, fetal aneuploidy    Review of Systems:      Constitutional:  No fever, fatigue  HENT:  No congestion, hearing changes  Eyes:  No visual disturbance  Respiratory:  No cough, shortness of breath  Cardiovascular:  No chest pain, leg swelling  Breast:  No lump, pain, nipple discharge, redness, skin changes  Gastrointestinal:  No abdominal pain, constipation, blood in stool   Genitourinary:  No dysuria, frequency  Endocrine:  No heat or cold intolerance  Musculoskeletal:  No back pain, arthralgias  Skin:  No rash, jaundice  Neurological:  No dizziness, weakness, headaches  Psychiatric/Behavioral:  No sleep disturbance, dysphoric mood     Physical Exam:     /83   Ht  "5' 8" (1.727 m)   Wt 91.6 kg (202 lb)   LMP 2018   Breastfeeding? No   BMI 30.71 kg/m²     GENERAL:  NAD. Well-nourished. A&Ox3.  HEENT:  NCAT, EOMI, moist mucus membranes, neck supple w/o masses.  BREAST:  Symmetric, no obvious masses, adenopathy, skin changes or nipple discharge.  LUNGS:  CTA-B.  HEART:  RRR, physiologic heart sounds.  ABDOMEN:  Soft, non-tender. Normoactive BS.  No obvious organomegaly.   EXT:  Symmetric w/o cramping, claudication, or edema. +2 distal pulses. FROM.  SKIN:  No rashes  NEURO:  CN II - XII grossly intact bilaterally. +2 DTR.  PSYCH:  Mood & affect appropriate.     PELVIC:  NFEG no lesion. No vaginal or cervical lesion. No bleeding or discharge. No CMT. Uterus and ovaries small, NT. Wet prep negative. Declined rectal exam. No obvious external lesions.    Chaperone present for exam.    Assessment:     33 y.o. , UPT positive, LMP 2018, here for confirmation of pregnancy    Plan:    We discussed principles of prenatal care, weight gain goals, dietary/lifestyle modifications, pregnancy care instructions and precautions.    Prenatal educational material d/w pt.  Continue prenatal vitamins.    F/u in 2 weeks for prenatal visit.    All questions answered, pt voiced understanding.      Julio Cesar Elias MD    _____________________________________________________________________    2018     9w0d here for initial OB visit and dating ultrasound.  Pt has no major complaints today.  Dating u/s shows single live IUP at 9w0d with AMARILYS c/w LMP.  Limitation of today's u/s exam discussed.  Order initial OB labs.  Pt declined first trimester aneuploidy screening, and state she would not terminate the pregnancy for any reasons.  Principles of prenatal care and precautions reviewed.  Return 4 wks.  Voiced understanding.    Julio Cesar Elias MD  _____________________________________________________________________      "

## 2018-09-27 LAB
C TRACH DNA SPEC QL NAA+PROBE: NOT DETECTED
HBV SURFACE AG SERPL QL IA: NEGATIVE
HCV AB SERPL QL IA: NEGATIVE
HIV 1+2 AB+HIV1 P24 AG SERPL QL IA: NEGATIVE
N GONORRHOEA DNA SPEC QL NAA+PROBE: NOT DETECTED
RPR SER QL: NORMAL
RUBV IGG SER-ACNC: 51 IU/ML
RUBV IGG SER-IMP: REACTIVE

## 2018-09-28 LAB — BACTERIA UR CULT: NORMAL

## 2018-10-01 LAB
HGB A2 MFR BLD HPLC: 2.4 %
HGB FRACT BLD ELPH-IMP: NORMAL
HGB FRACT BLD ELPH-IMP: NORMAL

## 2018-10-24 ENCOUNTER — ROUTINE PRENATAL (OUTPATIENT)
Dept: OBSTETRICS AND GYNECOLOGY | Facility: CLINIC | Age: 33
End: 2018-10-24
Payer: COMMERCIAL

## 2018-10-24 VITALS — WEIGHT: 202 LBS | DIASTOLIC BLOOD PRESSURE: 81 MMHG | SYSTOLIC BLOOD PRESSURE: 128 MMHG | BODY MASS INDEX: 30.71 KG/M2

## 2018-10-24 DIAGNOSIS — Z3A.13 13 WEEKS GESTATION OF PREGNANCY: Primary | ICD-10-CM

## 2018-10-24 PROCEDURE — 99212 OFFICE O/P EST SF 10 MIN: CPT | Mod: PBBFAC | Performed by: OBSTETRICS & GYNECOLOGY

## 2018-10-24 PROCEDURE — 99999 PR PBB SHADOW E&M-EST. PATIENT-LVL II: CPT | Mod: PBBFAC,,, | Performed by: OBSTETRICS & GYNECOLOGY

## 2018-10-24 PROCEDURE — 99213 OFFICE O/P EST LOW 20 MIN: CPT | Mod: TH,S$PBB,, | Performed by: OBSTETRICS & GYNECOLOGY

## 2018-10-24 NOTE — PROGRESS NOTES
13w0d here for OB visit.  Pt has no major complaints today.  Initial OB lab results d/w pt..  Pt declined first trimester aneuploidy screening, and state she would not terminate the pregnancy for any reasons.  Principles of prenatal care and precautions reviewed.  Return 4 wks.  Voiced understanding.  Family present for visit.

## 2018-10-25 ENCOUNTER — TELEPHONE (OUTPATIENT)
Dept: OBSTETRICS AND GYNECOLOGY | Facility: CLINIC | Age: 33
End: 2018-10-25

## 2018-10-25 NOTE — TELEPHONE ENCOUNTER
"----- Message from Elana Jeffries sent at 10/25/2018  1:17 PM CDT -----  Contact: Self/ [phone]  Pt calling to ask for updated work note to say "pt cannot work no more than 5 days a week". Please upload through [facility] so she may print out. Thank you.  -------------------------------------------------  10/25/18 @ 2673 ([facility])  SPOKE WITH MS CHANG , INFORMED HER THAT SHE CAN COME  HER LETTER FOR WORK  "

## 2018-11-29 ENCOUNTER — LAB VISIT (OUTPATIENT)
Dept: LAB | Facility: HOSPITAL | Age: 33
End: 2018-11-29
Attending: OBSTETRICS & GYNECOLOGY
Payer: COMMERCIAL

## 2018-11-29 ENCOUNTER — ROUTINE PRENATAL (OUTPATIENT)
Dept: OBSTETRICS AND GYNECOLOGY | Facility: CLINIC | Age: 33
End: 2018-11-29
Payer: COMMERCIAL

## 2018-11-29 VITALS
BODY MASS INDEX: 31.58 KG/M2 | DIASTOLIC BLOOD PRESSURE: 72 MMHG | WEIGHT: 207.69 LBS | SYSTOLIC BLOOD PRESSURE: 112 MMHG

## 2018-11-29 DIAGNOSIS — Z3A.18 18 WEEKS GESTATION OF PREGNANCY: ICD-10-CM

## 2018-11-29 DIAGNOSIS — Z3A.18 18 WEEKS GESTATION OF PREGNANCY: Primary | ICD-10-CM

## 2018-11-29 PROCEDURE — 99212 OFFICE O/P EST SF 10 MIN: CPT | Mod: PBBFAC | Performed by: OBSTETRICS & GYNECOLOGY

## 2018-11-29 PROCEDURE — 99213 OFFICE O/P EST LOW 20 MIN: CPT | Mod: TH,S$PBB,, | Performed by: OBSTETRICS & GYNECOLOGY

## 2018-11-29 PROCEDURE — 81511 FTL CGEN ABNOR FOUR ANAL: CPT

## 2018-11-29 PROCEDURE — 99999 PR PBB SHADOW E&M-EST. PATIENT-LVL II: CPT | Mod: PBBFAC,,, | Performed by: OBSTETRICS & GYNECOLOGY

## 2018-11-29 PROCEDURE — 36415 COLL VENOUS BLD VENIPUNCTURE: CPT

## 2018-11-29 NOTE — PROGRESS NOTES
18w1d here for OB visit.  No major complaints today.  Reports active fetus.  Denies vaginal bleeding, leakage of fluid, or contractions.  Order quad screen today.  Anatomy ultrasound next visit.  Precautions reviewed.  Return 2 wks.  Voiced understanding.  Significant other present for visit.

## 2018-12-03 LAB
# FETUSES US: NORMAL
2ND TRIMESTER 4 SCREEN PNL SERPL: NEGATIVE
2ND TRIMESTER 4 SCREEN SERPL-IMP: NORMAL
AFP MOM SERPL: 0.79
AFP SERPL-MCNC: 37.5 NG/ML
AGE AT DELIVERY: 34
B-HCG MOM SERPL: 1.32
B-HCG SERPL-ACNC: 25.2 IU/ML
FET TS 21 RISK FROM MAT AGE: NORMAL
GA (DAYS): 1 D
GA (WEEKS): 19 WK
GA METHOD: NORMAL
IDDM PATIENT QL: NORMAL
INHIBIN A MOM SERPL: 1.05
INHIBIN A SERPL-MCNC: 159 PG/ML
SMOKING STATUS FTND: NORMAL
TS 18 RISK FETUS: NORMAL
TS 21 RISK FETUS: NORMAL
U ESTRIOL MOM SERPL: 0.79
U ESTRIOL SERPL-MCNC: 1.13 NG/ML

## 2018-12-19 ENCOUNTER — ROUTINE PRENATAL (OUTPATIENT)
Dept: OBSTETRICS AND GYNECOLOGY | Facility: CLINIC | Age: 33
End: 2018-12-19
Payer: COMMERCIAL

## 2018-12-19 VITALS
WEIGHT: 210.56 LBS | DIASTOLIC BLOOD PRESSURE: 72 MMHG | SYSTOLIC BLOOD PRESSURE: 108 MMHG | BODY MASS INDEX: 32.01 KG/M2

## 2018-12-19 DIAGNOSIS — Z36.3 ANTENATAL SCREENING FOR MALFORMATION USING ULTRASONICS: ICD-10-CM

## 2018-12-19 DIAGNOSIS — Z3A.21 21 WEEKS GESTATION OF PREGNANCY: Primary | ICD-10-CM

## 2018-12-19 PROCEDURE — 76805 OB US >/= 14 WKS SNGL FETUS: CPT | Mod: S$GLB,,, | Performed by: OBSTETRICS & GYNECOLOGY

## 2018-12-19 PROCEDURE — 99212 OFFICE O/P EST SF 10 MIN: CPT | Mod: PBBFAC,TH | Performed by: OBSTETRICS & GYNECOLOGY

## 2018-12-19 PROCEDURE — 76805 OB US >/= 14 WKS SNGL FETUS: CPT | Mod: PBBFAC | Performed by: OBSTETRICS & GYNECOLOGY

## 2018-12-19 PROCEDURE — 99999 PR PBB SHADOW E&M-EST. PATIENT-LVL II: CPT | Mod: PBBFAC,,, | Performed by: OBSTETRICS & GYNECOLOGY

## 2018-12-19 PROCEDURE — 99213 OFFICE O/P EST LOW 20 MIN: CPT | Mod: 25,S$GLB,, | Performed by: OBSTETRICS & GYNECOLOGY

## 2018-12-20 NOTE — PROGRESS NOTES
21w0d here for OB visit and anatomy ultrasound.    Pt has no major complaints today.  Reports active fetus.  Denies vaginal bleeding, leakage of fluid, or contractions.    Anatomy u/s shows normal appearing fetus with dating c/w established EDC.  Limitation of today's u/s exam discussed.  Quad screen negative.     Precautions reviewed.  Return 4 wks.  Voiced understanding.

## 2019-01-17 ENCOUNTER — ROUTINE PRENATAL (OUTPATIENT)
Dept: OBSTETRICS AND GYNECOLOGY | Facility: CLINIC | Age: 34
End: 2019-01-17
Payer: COMMERCIAL

## 2019-01-17 ENCOUNTER — LAB VISIT (OUTPATIENT)
Dept: LAB | Facility: HOSPITAL | Age: 34
End: 2019-01-17
Attending: OBSTETRICS & GYNECOLOGY
Payer: COMMERCIAL

## 2019-01-17 VITALS
DIASTOLIC BLOOD PRESSURE: 76 MMHG | BODY MASS INDEX: 32.28 KG/M2 | SYSTOLIC BLOOD PRESSURE: 112 MMHG | WEIGHT: 212.31 LBS

## 2019-01-17 DIAGNOSIS — Z3A.25 25 WEEKS GESTATION OF PREGNANCY: ICD-10-CM

## 2019-01-17 DIAGNOSIS — Z3A.25 25 WEEKS GESTATION OF PREGNANCY: Primary | ICD-10-CM

## 2019-01-17 LAB
BASOPHILS # BLD AUTO: 0.01 K/UL
BASOPHILS NFR BLD: 0.1 %
DIFFERENTIAL METHOD: ABNORMAL
EOSINOPHIL # BLD AUTO: 0.1 K/UL
EOSINOPHIL NFR BLD: 0.7 %
ERYTHROCYTE [DISTWIDTH] IN BLOOD BY AUTOMATED COUNT: 13.9 %
GLUCOSE SERPL-MCNC: 113 MG/DL
HCT VFR BLD AUTO: 32 %
HGB BLD-MCNC: 11.1 G/DL
LYMPHOCYTES # BLD AUTO: 1.7 K/UL
LYMPHOCYTES NFR BLD: 24.6 %
MCH RBC QN AUTO: 29.1 PG
MCHC RBC AUTO-ENTMCNC: 34.7 G/DL
MCV RBC AUTO: 84 FL
MONOCYTES # BLD AUTO: 0.2 K/UL
MONOCYTES NFR BLD: 3.3 %
NEUTROPHILS # BLD AUTO: 4.9 K/UL
NEUTROPHILS NFR BLD: 71.3 %
PLATELET # BLD AUTO: 168 K/UL
PMV BLD AUTO: 10.7 FL
RBC # BLD AUTO: 3.82 M/UL
WBC # BLD AUTO: 6.92 K/UL

## 2019-01-17 PROCEDURE — 99999 PR PBB SHADOW E&M-EST. PATIENT-LVL II: ICD-10-PCS | Mod: PBBFAC,,, | Performed by: OBSTETRICS & GYNECOLOGY

## 2019-01-17 PROCEDURE — 0502F PR SUBSEQUENT PRENATAL CARE: ICD-10-PCS | Mod: S$GLB,,, | Performed by: OBSTETRICS & GYNECOLOGY

## 2019-01-17 PROCEDURE — 85025 COMPLETE CBC W/AUTO DIFF WBC: CPT

## 2019-01-17 PROCEDURE — 36415 COLL VENOUS BLD VENIPUNCTURE: CPT

## 2019-01-17 PROCEDURE — 82950 GLUCOSE TEST: CPT

## 2019-01-17 PROCEDURE — 99999 PR PBB SHADOW E&M-EST. PATIENT-LVL II: CPT | Mod: PBBFAC,,, | Performed by: OBSTETRICS & GYNECOLOGY

## 2019-01-17 PROCEDURE — 0502F SUBSEQUENT PRENATAL CARE: CPT | Mod: S$GLB,,, | Performed by: OBSTETRICS & GYNECOLOGY

## 2019-01-17 NOTE — PROGRESS NOTES
25w1d here for OB visit.  Pt has no major complaints today.  Reports active fetus.  Denies vaginal bleeding, leakage of fluid, or contractions.  Order 1 hr glucola, CBC.  Labor/preE precautions.  Return 4 wks.  Voiced understanding.  Significant other present for visit.

## 2019-02-11 ENCOUNTER — ROUTINE PRENATAL (OUTPATIENT)
Dept: OBSTETRICS AND GYNECOLOGY | Facility: CLINIC | Age: 34
End: 2019-02-11
Payer: COMMERCIAL

## 2019-02-11 VITALS
SYSTOLIC BLOOD PRESSURE: 120 MMHG | BODY MASS INDEX: 32.41 KG/M2 | DIASTOLIC BLOOD PRESSURE: 72 MMHG | WEIGHT: 213.19 LBS

## 2019-02-11 DIAGNOSIS — Z3A.28 28 WEEKS GESTATION OF PREGNANCY: Primary | ICD-10-CM

## 2019-02-11 PROCEDURE — 99999 PR PBB SHADOW E&M-EST. PATIENT-LVL II: CPT | Mod: PBBFAC,,, | Performed by: OBSTETRICS & GYNECOLOGY

## 2019-02-11 PROCEDURE — 0502F PR SUBSEQUENT PRENATAL CARE: ICD-10-PCS | Mod: CPTII,S$GLB,, | Performed by: OBSTETRICS & GYNECOLOGY

## 2019-02-11 PROCEDURE — 99999 PR PBB SHADOW E&M-EST. PATIENT-LVL II: ICD-10-PCS | Mod: PBBFAC,,, | Performed by: OBSTETRICS & GYNECOLOGY

## 2019-02-11 PROCEDURE — 0502F SUBSEQUENT PRENATAL CARE: CPT | Mod: CPTII,S$GLB,, | Performed by: OBSTETRICS & GYNECOLOGY

## 2019-02-11 RX ORDER — FERROUS SULFATE 325(65) MG
325 TABLET ORAL 2 TIMES DAILY
Qty: 60 TABLET | Refills: 1 | Status: SHIPPED | OUTPATIENT
Start: 2019-02-11 | End: 2020-03-12

## 2019-02-25 ENCOUNTER — ROUTINE PRENATAL (OUTPATIENT)
Dept: OBSTETRICS AND GYNECOLOGY | Facility: CLINIC | Age: 34
End: 2019-02-25
Payer: COMMERCIAL

## 2019-02-25 VITALS
WEIGHT: 216.25 LBS | SYSTOLIC BLOOD PRESSURE: 126 MMHG | BODY MASS INDEX: 32.88 KG/M2 | DIASTOLIC BLOOD PRESSURE: 78 MMHG

## 2019-02-25 DIAGNOSIS — Z3A.30 30 WEEKS GESTATION OF PREGNANCY: Primary | ICD-10-CM

## 2019-02-25 PROCEDURE — 99999 PR PBB SHADOW E&M-EST. PATIENT-LVL II: CPT | Mod: PBBFAC,,, | Performed by: OBSTETRICS & GYNECOLOGY

## 2019-02-25 PROCEDURE — 0502F SUBSEQUENT PRENATAL CARE: CPT | Mod: CPTII,S$GLB,, | Performed by: OBSTETRICS & GYNECOLOGY

## 2019-02-25 PROCEDURE — 99999 PR PBB SHADOW E&M-EST. PATIENT-LVL II: ICD-10-PCS | Mod: PBBFAC,,, | Performed by: OBSTETRICS & GYNECOLOGY

## 2019-02-25 PROCEDURE — 0502F PR SUBSEQUENT PRENATAL CARE: ICD-10-PCS | Mod: CPTII,S$GLB,, | Performed by: OBSTETRICS & GYNECOLOGY

## 2019-02-25 RX ORDER — SYRINGE WITH NEEDLE, INSULIN
1 SYRINGE, EMPTY DISPOSABLE MISCELLANEOUS DAILY
Refills: 11 | Status: ON HOLD | COMMUNITY
Start: 2019-02-11 | End: 2019-04-25 | Stop reason: HOSPADM

## 2019-02-25 NOTE — PROGRESS NOTES
30w5d here for OB visit.    Pt has no major complaints today.  Reports active fetus.  Denies vaginal bleeding, leakage of fluid, or contractions.  Continue fergon/colace for anemia.  Labor/preE precautions.  Kick counts.  F/u 2 wks.  Voiced understanding.  Family/significant other present for visit.

## 2019-03-11 ENCOUNTER — ROUTINE PRENATAL (OUTPATIENT)
Dept: OBSTETRICS AND GYNECOLOGY | Facility: CLINIC | Age: 34
End: 2019-03-11
Payer: COMMERCIAL

## 2019-03-11 VITALS — SYSTOLIC BLOOD PRESSURE: 116 MMHG | WEIGHT: 216.38 LBS | DIASTOLIC BLOOD PRESSURE: 74 MMHG | BODY MASS INDEX: 32.9 KG/M2

## 2019-03-11 DIAGNOSIS — Z3A.32 32 WEEKS GESTATION OF PREGNANCY: Primary | ICD-10-CM

## 2019-03-11 PROCEDURE — 0502F PR SUBSEQUENT PRENATAL CARE: ICD-10-PCS | Mod: CPTII,S$GLB,, | Performed by: OBSTETRICS & GYNECOLOGY

## 2019-03-11 PROCEDURE — 99999 PR PBB SHADOW E&M-EST. PATIENT-LVL II: ICD-10-PCS | Mod: PBBFAC,,, | Performed by: OBSTETRICS & GYNECOLOGY

## 2019-03-11 PROCEDURE — 0502F SUBSEQUENT PRENATAL CARE: CPT | Mod: CPTII,S$GLB,, | Performed by: OBSTETRICS & GYNECOLOGY

## 2019-03-11 PROCEDURE — 99999 PR PBB SHADOW E&M-EST. PATIENT-LVL II: CPT | Mod: PBBFAC,,, | Performed by: OBSTETRICS & GYNECOLOGY

## 2019-03-11 NOTE — PROGRESS NOTES
32w5d here for OB visit.  No major complaints today.  Reports active fetus.  Denies vaginal bleeding, leakage of fluid, or contractions.  US for FHT shows baby breech presentation.  Continue fergon/colace for anemia.  Labor/preE precautions.  Kick counts.  F/u 2 wks.  Voiced understanding.  Friend present for visit.

## 2019-03-25 ENCOUNTER — ROUTINE PRENATAL (OUTPATIENT)
Dept: OBSTETRICS AND GYNECOLOGY | Facility: CLINIC | Age: 34
End: 2019-03-25
Payer: COMMERCIAL

## 2019-03-25 VITALS
BODY MASS INDEX: 32.85 KG/M2 | WEIGHT: 216.06 LBS | DIASTOLIC BLOOD PRESSURE: 78 MMHG | SYSTOLIC BLOOD PRESSURE: 118 MMHG

## 2019-03-25 DIAGNOSIS — Z3A.34 34 WEEKS GESTATION OF PREGNANCY: Primary | ICD-10-CM

## 2019-03-25 PROCEDURE — 0502F SUBSEQUENT PRENATAL CARE: CPT | Mod: CPTII,S$GLB,, | Performed by: OBSTETRICS & GYNECOLOGY

## 2019-03-25 PROCEDURE — 99999 PR PBB SHADOW E&M-EST. PATIENT-LVL II: CPT | Mod: PBBFAC,,, | Performed by: OBSTETRICS & GYNECOLOGY

## 2019-03-25 PROCEDURE — 0502F PR SUBSEQUENT PRENATAL CARE: ICD-10-PCS | Mod: CPTII,S$GLB,, | Performed by: OBSTETRICS & GYNECOLOGY

## 2019-03-25 PROCEDURE — 99999 PR PBB SHADOW E&M-EST. PATIENT-LVL II: ICD-10-PCS | Mod: PBBFAC,,, | Performed by: OBSTETRICS & GYNECOLOGY

## 2019-03-25 NOTE — PROGRESS NOTES
34w5d here for OB visit.  Pt has no major complaints today.  Reports active fetus.  Denies vaginal bleeding, leakage of fluid, or contractions.  Baby cephalic presentation today.  Continue fergon/colace for anemia.  Labor/preE precautions.  Kick counts.   F/u 2 wks.  Voiced understanding.  Significant other present for visit.    Julio Cesar Elias MD

## 2019-04-08 ENCOUNTER — ROUTINE PRENATAL (OUTPATIENT)
Dept: OBSTETRICS AND GYNECOLOGY | Facility: CLINIC | Age: 34
End: 2019-04-08
Payer: COMMERCIAL

## 2019-04-08 VITALS
SYSTOLIC BLOOD PRESSURE: 120 MMHG | WEIGHT: 215.38 LBS | DIASTOLIC BLOOD PRESSURE: 78 MMHG | BODY MASS INDEX: 32.75 KG/M2

## 2019-04-08 DIAGNOSIS — Z3A.36 36 WEEKS GESTATION OF PREGNANCY: Primary | ICD-10-CM

## 2019-04-08 PROCEDURE — 0502F SUBSEQUENT PRENATAL CARE: CPT | Mod: CPTII,S$GLB,, | Performed by: OBSTETRICS & GYNECOLOGY

## 2019-04-08 PROCEDURE — 99999 PR PBB SHADOW E&M-EST. PATIENT-LVL II: ICD-10-PCS | Mod: PBBFAC,,, | Performed by: OBSTETRICS & GYNECOLOGY

## 2019-04-08 PROCEDURE — 99999 PR PBB SHADOW E&M-EST. PATIENT-LVL II: CPT | Mod: PBBFAC,,, | Performed by: OBSTETRICS & GYNECOLOGY

## 2019-04-08 PROCEDURE — 87081 CULTURE SCREEN ONLY: CPT

## 2019-04-08 PROCEDURE — 0502F PR SUBSEQUENT PRENATAL CARE: ICD-10-PCS | Mod: CPTII,S$GLB,, | Performed by: OBSTETRICS & GYNECOLOGY

## 2019-04-08 NOTE — PROGRESS NOTES
36w5d here for OB visit.    No major complaints today.  Reports active fetus.  Denies vaginal bleeding, leakage of fluid, or contractions.  Order CBC, HIV, GBS.  Delivery consents signed.  Continue fergon/colace for anemia.  Labor/preE precautions.  Kick counts.   F/u 1 wk.  Voiced understanding.  Significant other present for visit.    Julio Cesar Elias MD

## 2019-04-10 LAB — BACTERIA SPEC AEROBE CULT: NORMAL

## 2019-04-11 ENCOUNTER — TELEPHONE (OUTPATIENT)
Dept: OBSTETRICS AND GYNECOLOGY | Facility: CLINIC | Age: 34
End: 2019-04-11

## 2019-04-15 ENCOUNTER — ROUTINE PRENATAL (OUTPATIENT)
Dept: OBSTETRICS AND GYNECOLOGY | Facility: CLINIC | Age: 34
End: 2019-04-15
Payer: COMMERCIAL

## 2019-04-15 VITALS
WEIGHT: 215.63 LBS | BODY MASS INDEX: 32.78 KG/M2 | DIASTOLIC BLOOD PRESSURE: 74 MMHG | SYSTOLIC BLOOD PRESSURE: 112 MMHG

## 2019-04-15 DIAGNOSIS — Z3A.37 37 WEEKS GESTATION OF PREGNANCY: Primary | ICD-10-CM

## 2019-04-15 PROCEDURE — 99999 PR PBB SHADOW E&M-EST. PATIENT-LVL II: CPT | Mod: PBBFAC,,, | Performed by: OBSTETRICS & GYNECOLOGY

## 2019-04-15 PROCEDURE — 99999 PR PBB SHADOW E&M-EST. PATIENT-LVL II: ICD-10-PCS | Mod: PBBFAC,,, | Performed by: OBSTETRICS & GYNECOLOGY

## 2019-04-15 PROCEDURE — 0502F SUBSEQUENT PRENATAL CARE: CPT | Mod: CPTII,S$GLB,, | Performed by: OBSTETRICS & GYNECOLOGY

## 2019-04-15 PROCEDURE — 0502F PR SUBSEQUENT PRENATAL CARE: ICD-10-PCS | Mod: CPTII,S$GLB,, | Performed by: OBSTETRICS & GYNECOLOGY

## 2019-04-15 NOTE — PROGRESS NOTES
37w5d here for OB visit.    Pt has no major complaints today.  Reports active fetus.  Denies vaginal bleeding, leakage of fluid, or contractions.  Continue fergon/colace for anemia.  Labor/preE precautions.  Kick counts.   F/u 1 wk.  Voiced understanding.  Significant other present for visit.    Julio Cesar Elias MD

## 2019-04-22 ENCOUNTER — ROUTINE PRENATAL (OUTPATIENT)
Dept: OBSTETRICS AND GYNECOLOGY | Facility: CLINIC | Age: 34
End: 2019-04-22
Payer: COMMERCIAL

## 2019-04-22 VITALS
DIASTOLIC BLOOD PRESSURE: 78 MMHG | BODY MASS INDEX: 32.88 KG/M2 | SYSTOLIC BLOOD PRESSURE: 106 MMHG | WEIGHT: 216.25 LBS

## 2019-04-22 DIAGNOSIS — Z3A.38 38 WEEKS GESTATION OF PREGNANCY: Primary | ICD-10-CM

## 2019-04-22 PROCEDURE — 0502F SUBSEQUENT PRENATAL CARE: CPT | Mod: CPTII,S$GLB,, | Performed by: OBSTETRICS & GYNECOLOGY

## 2019-04-22 PROCEDURE — 99999 PR PBB SHADOW E&M-EST. PATIENT-LVL II: ICD-10-PCS | Mod: PBBFAC,,, | Performed by: OBSTETRICS & GYNECOLOGY

## 2019-04-22 PROCEDURE — 99999 PR PBB SHADOW E&M-EST. PATIENT-LVL II: CPT | Mod: PBBFAC,,, | Performed by: OBSTETRICS & GYNECOLOGY

## 2019-04-22 PROCEDURE — 0502F PR SUBSEQUENT PRENATAL CARE: ICD-10-PCS | Mod: CPTII,S$GLB,, | Performed by: OBSTETRICS & GYNECOLOGY

## 2019-04-22 RX ORDER — BUTORPHANOL TARTRATE 1 MG/ML
1 INJECTION INTRAMUSCULAR; INTRAVENOUS
Status: CANCELLED | OUTPATIENT
Start: 2019-04-22

## 2019-04-22 RX ORDER — SODIUM CHLORIDE, SODIUM LACTATE, POTASSIUM CHLORIDE, CALCIUM CHLORIDE 600; 310; 30; 20 MG/100ML; MG/100ML; MG/100ML; MG/100ML
INJECTION, SOLUTION INTRAVENOUS CONTINUOUS
Status: CANCELLED | OUTPATIENT
Start: 2019-04-22

## 2019-04-22 RX ORDER — ONDANSETRON 4 MG/1
8 TABLET, ORALLY DISINTEGRATING ORAL EVERY 8 HOURS PRN
Status: CANCELLED | OUTPATIENT
Start: 2019-04-22

## 2019-04-22 RX ORDER — MISOPROSTOL 100 UG/1
600 TABLET ORAL
Status: CANCELLED | OUTPATIENT
Start: 2019-04-22

## 2019-04-22 RX ORDER — SODIUM CHLORIDE 9 MG/ML
INJECTION, SOLUTION INTRAVENOUS
Status: CANCELLED | OUTPATIENT
Start: 2019-04-22

## 2019-04-22 NOTE — PROGRESS NOTES
"38w5d here for OB visit.    No major complaints today.  Reports active fetus.  Denies vaginal bleeding, leakage of fluid, or contractions.    Pt is requesting induction of labor this Wednesday due to far distance from hospital "we live 1 hr away".  Benefits of waiting for spontaneous labor discussed.  Risks, benefits, and alternatives to IOL reviewed including but not limited to failed induction resulting .  Pt is resolute in her decision to proceed with IOL.    Continue fergon/colace for anemia.  Labor/preE precautions.  Kick counts.   Go to L&D for any concerns.  Return prn.  Voiced understanding.  Family present for visit.      Julio Cesar Elias MD    "

## 2019-04-23 ENCOUNTER — HOSPITAL ENCOUNTER (INPATIENT)
Facility: HOSPITAL | Age: 34
LOS: 2 days | Discharge: HOME | End: 2019-04-25
Attending: OBSTETRICS & GYNECOLOGY | Admitting: OBSTETRICS & GYNECOLOGY
Payer: COMMERCIAL

## 2019-04-23 ENCOUNTER — ANESTHESIA EVENT (OUTPATIENT)
Dept: OBSTETRICS AND GYNECOLOGY | Facility: HOSPITAL | Age: 34
End: 2019-04-23
Payer: COMMERCIAL

## 2019-04-23 ENCOUNTER — ANESTHESIA (OUTPATIENT)
Dept: OBSTETRICS AND GYNECOLOGY | Facility: HOSPITAL | Age: 34
End: 2019-04-23
Payer: MEDICAID

## 2019-04-23 DIAGNOSIS — Z34.90 PREGNANCY WITH ONE FETUS: ICD-10-CM

## 2019-04-23 DIAGNOSIS — Z3A.38 38 WEEKS GESTATION OF PREGNANCY: ICD-10-CM

## 2019-04-23 PROBLEM — Z3A.39 39 WEEKS GESTATION OF PREGNANCY: Status: RESOLVED | Noted: 2018-06-24 | Resolved: 2019-04-23

## 2019-04-23 LAB
ABO + RH BLD: NORMAL
BASOPHILS # BLD AUTO: 0.01 K/UL (ref 0–0.2)
BASOPHILS NFR BLD: 0.1 % (ref 0–1.9)
BLD GP AB SCN CELLS X3 SERPL QL: NORMAL
DIFFERENTIAL METHOD: ABNORMAL
EOSINOPHIL # BLD AUTO: 0 K/UL (ref 0–0.5)
EOSINOPHIL NFR BLD: 0.3 % (ref 0–8)
ERYTHROCYTE [DISTWIDTH] IN BLOOD BY AUTOMATED COUNT: 13.7 % (ref 11.5–14.5)
HCT VFR BLD AUTO: 33.5 % (ref 37–48.5)
HGB BLD-MCNC: 11 G/DL (ref 12–16)
HIV1+2 IGG SERPL QL IA.RAPID: NEGATIVE
LYMPHOCYTES # BLD AUTO: 1 K/UL (ref 1–4.8)
LYMPHOCYTES NFR BLD: 14.8 % (ref 18–48)
MCH RBC QN AUTO: 28.4 PG (ref 27–31)
MCHC RBC AUTO-ENTMCNC: 32.8 G/DL (ref 32–36)
MCV RBC AUTO: 87 FL (ref 82–98)
MONOCYTES # BLD AUTO: 0.4 K/UL (ref 0.3–1)
MONOCYTES NFR BLD: 6 % (ref 4–15)
NEUTROPHILS # BLD AUTO: 5.6 K/UL (ref 1.8–7.7)
NEUTROPHILS NFR BLD: 78.8 % (ref 38–73)
PLATELET # BLD AUTO: 151 K/UL (ref 150–350)
PMV BLD AUTO: 11.3 FL (ref 9.2–12.9)
RBC # BLD AUTO: 3.87 M/UL (ref 4–5.4)
WBC # BLD AUTO: 7.05 K/UL (ref 3.9–12.7)

## 2019-04-23 PROCEDURE — 85025 COMPLETE CBC W/AUTO DIFF WBC: CPT

## 2019-04-23 PROCEDURE — S0020 INJECTION, BUPIVICAINE HYDRO: HCPCS | Performed by: ANESTHESIOLOGY

## 2019-04-23 PROCEDURE — 11000001 HC ACUTE MED/SURG PRIVATE ROOM

## 2019-04-23 PROCEDURE — 59409 PRA ETRICAL CARE,VAG DELIV ONLY: ICD-10-PCS | Mod: AA,,, | Performed by: ANESTHESIOLOGY

## 2019-04-23 PROCEDURE — 86703 HIV-1/HIV-2 1 RESULT ANTBDY: CPT

## 2019-04-23 PROCEDURE — 25000003 PHARM REV CODE 250: Performed by: OBSTETRICS & GYNECOLOGY

## 2019-04-23 PROCEDURE — 59400 OBSTETRICAL CARE: CPT | Mod: AT,,, | Performed by: OBSTETRICS & GYNECOLOGY

## 2019-04-23 PROCEDURE — 59409 OBSTETRICAL CARE: CPT | Mod: AA,,, | Performed by: ANESTHESIOLOGY

## 2019-04-23 PROCEDURE — 59400 PR FULL ROUT OBSTE CARE,VAGINAL DELIV: ICD-10-PCS | Mod: AT,,, | Performed by: OBSTETRICS & GYNECOLOGY

## 2019-04-23 PROCEDURE — 62326 NJX INTERLAMINAR LMBR/SAC: CPT | Performed by: ANESTHESIOLOGY

## 2019-04-23 PROCEDURE — 27200710 HC EPIDURAL INFUSION PUMP SET: Performed by: ANESTHESIOLOGY

## 2019-04-23 PROCEDURE — 86901 BLOOD TYPING SEROLOGIC RH(D): CPT

## 2019-04-23 PROCEDURE — 27800517 HC TRAY,EPIDURAL-CONTINUOUS: Performed by: ANESTHESIOLOGY

## 2019-04-23 PROCEDURE — 72200004 HC VAGINAL DELIVERY LEVEL I: Performed by: ANESTHESIOLOGY

## 2019-04-23 PROCEDURE — 25000003 PHARM REV CODE 250: Performed by: ANESTHESIOLOGY

## 2019-04-23 RX ORDER — HYDROCORTISONE 25 MG/G
CREAM TOPICAL 3 TIMES DAILY PRN
Status: DISCONTINUED | OUTPATIENT
Start: 2019-04-23 | End: 2019-04-25 | Stop reason: HOSPADM

## 2019-04-23 RX ORDER — ONDANSETRON 8 MG/1
8 TABLET, ORALLY DISINTEGRATING ORAL EVERY 8 HOURS PRN
Status: DISCONTINUED | OUTPATIENT
Start: 2019-04-23 | End: 2019-04-25 | Stop reason: HOSPADM

## 2019-04-23 RX ORDER — OXYTOCIN/RINGER'S LACTATE 20/1000 ML
333 PLASTIC BAG, INJECTION (ML) INTRAVENOUS CONTINUOUS
Status: DISCONTINUED | OUTPATIENT
Start: 2019-04-23 | End: 2019-04-23

## 2019-04-23 RX ORDER — OXYTOCIN/RINGER'S LACTATE 20/1000 ML
41.7 PLASTIC BAG, INJECTION (ML) INTRAVENOUS CONTINUOUS
Status: DISCONTINUED | OUTPATIENT
Start: 2019-04-23 | End: 2019-04-23

## 2019-04-23 RX ORDER — ONDANSETRON 8 MG/1
8 TABLET, ORALLY DISINTEGRATING ORAL EVERY 8 HOURS PRN
Status: DISCONTINUED | OUTPATIENT
Start: 2019-04-23 | End: 2019-04-23

## 2019-04-23 RX ORDER — OXYTOCIN/RINGER'S LACTATE 20/1000 ML
2 PLASTIC BAG, INJECTION (ML) INTRAVENOUS CONTINUOUS
Status: DISCONTINUED | OUTPATIENT
Start: 2019-04-25 | End: 2019-04-23

## 2019-04-23 RX ORDER — IBUPROFEN 600 MG/1
600 TABLET ORAL EVERY 6 HOURS PRN
Status: DISCONTINUED | OUTPATIENT
Start: 2019-04-23 | End: 2019-04-25 | Stop reason: HOSPADM

## 2019-04-23 RX ORDER — SODIUM CHLORIDE, SODIUM LACTATE, POTASSIUM CHLORIDE, CALCIUM CHLORIDE 600; 310; 30; 20 MG/100ML; MG/100ML; MG/100ML; MG/100ML
INJECTION, SOLUTION INTRAVENOUS CONTINUOUS
Status: DISCONTINUED | OUTPATIENT
Start: 2019-04-23 | End: 2019-04-23

## 2019-04-23 RX ORDER — BUPIVACAINE HYDROCHLORIDE 2.5 MG/ML
INJECTION, SOLUTION INFILTRATION; PERINEURAL
Status: COMPLETED | OUTPATIENT
Start: 2019-04-23 | End: 2019-04-23

## 2019-04-23 RX ORDER — DIPHENHYDRAMINE HCL 25 MG
25 CAPSULE ORAL EVERY 4 HOURS PRN
Status: DISCONTINUED | OUTPATIENT
Start: 2019-04-23 | End: 2019-04-25 | Stop reason: HOSPADM

## 2019-04-23 RX ORDER — BUTORPHANOL TARTRATE 2 MG/ML
1 INJECTION INTRAMUSCULAR; INTRAVENOUS
Status: DISCONTINUED | OUTPATIENT
Start: 2019-04-23 | End: 2019-04-23

## 2019-04-23 RX ORDER — SODIUM CHLORIDE 9 MG/ML
INJECTION, SOLUTION INTRAVENOUS
Status: DISCONTINUED | OUTPATIENT
Start: 2019-04-23 | End: 2019-04-23

## 2019-04-23 RX ORDER — DOCUSATE SODIUM 100 MG/1
200 CAPSULE, LIQUID FILLED ORAL 2 TIMES DAILY PRN
Status: DISCONTINUED | OUTPATIENT
Start: 2019-04-23 | End: 2019-04-25 | Stop reason: HOSPADM

## 2019-04-23 RX ORDER — SIMETHICONE 80 MG
1 TABLET,CHEWABLE ORAL EVERY 6 HOURS PRN
Status: DISCONTINUED | OUTPATIENT
Start: 2019-04-23 | End: 2019-04-25 | Stop reason: HOSPADM

## 2019-04-23 RX ORDER — OXYTOCIN/RINGER'S LACTATE 20/1000 ML
41.65 PLASTIC BAG, INJECTION (ML) INTRAVENOUS CONTINUOUS
Status: ACTIVE | OUTPATIENT
Start: 2019-04-23 | End: 2019-04-23

## 2019-04-23 RX ORDER — OXYCODONE AND ACETAMINOPHEN 10; 325 MG/1; MG/1
1 TABLET ORAL EVERY 4 HOURS PRN
Status: DISCONTINUED | OUTPATIENT
Start: 2019-04-23 | End: 2019-04-25 | Stop reason: HOSPADM

## 2019-04-23 RX ORDER — OXYCODONE AND ACETAMINOPHEN 5; 325 MG/1; MG/1
1 TABLET ORAL EVERY 4 HOURS PRN
Status: DISCONTINUED | OUTPATIENT
Start: 2019-04-23 | End: 2019-04-25 | Stop reason: HOSPADM

## 2019-04-23 RX ADMIN — SODIUM CHLORIDE, SODIUM LACTATE, POTASSIUM CHLORIDE, AND CALCIUM CHLORIDE 1000 ML: .6; .31; .03; .02 INJECTION, SOLUTION INTRAVENOUS at 08:04

## 2019-04-23 RX ADMIN — SODIUM CHLORIDE, SODIUM LACTATE, POTASSIUM CHLORIDE, AND CALCIUM CHLORIDE: .6; .31; .03; .02 INJECTION, SOLUTION INTRAVENOUS at 09:04

## 2019-04-23 RX ADMIN — BUPIVACAINE HYDROCHLORIDE 10 ML: 2.5 INJECTION, SOLUTION INFILTRATION; PERINEURAL at 09:04

## 2019-04-23 NOTE — L&D DELIVERY NOTE
[facility]   Delivery Summary  Obstetrics & Gynecology       Date of Delivery:  2019        Preoperative Diagnosis:    [place] gestation at 38w6d in active labor    Postoperative Diagnosis:    [place] gestation at 38w6d s/p spontaneous vaginal delivery  Live infant  Procedure Performed:  Vaginal delivery    Indication (HPI):     See History & Physical for complete details.  In brief, Pilar Hernandez is a 34 y.o.  at 38w6d gestation who presented to L&D in active labor.  Pt was admitted to L&D for active management of labor.  Pt progressed well through the active phase of labor and delivered a viable infant.  Maternal and fetal status was overall reassuring throughout the labor course.  SROM at 2019 shortly prior to delivery with moderate, clear fluid, no odor and had no intrapartum fever.  Pt was informed of the risks, benefits, alternatives and possible complications to a vaginal delivery.  Informed consent was obtained for delivery and blood transfusion.      Anesthesia:  Epidural     EBL:  110 mL with no replacements    Specimens:  Cord blood      Findings, Infant & Apgars:      Live male infant, APGAR 1 min: 9, 5 min: 9, transfer to well baby  Weight pending at time of note   SROM 2019 shortly prior to delivery with moderate, clear fluid, no odor, no intrapartum fever   No laceration    Complications:  None    Delivery Summary:      Vaginal delivery of viable infant.  Infant delivered after 7 minutes of pushing.  No nuchal cord.  No shoulder dystocia.  No laceration.  Episiotomy was not performed.  The infant was vigorous with a strong cry.  Cord blood was collected.   The placenta delivered spontaneously intact with three vessel cord.    Uterine massage and 20 units of Pitocin IV were given until the fundus was firm.    Hemostasis was noted.    No sponges were left in the vagina.   Sponge, lap, needle, and instrument counts were correct time two.    Mother and baby were bonding well at the end of the delivery both in stable condition.   Findings and expectations were discussed with the patient.   All of pt questions were answered to her satisfaction, pt voiced understanding.      Julio Cesar Elias MD

## 2019-04-23 NOTE — ANESTHESIA PREPROCEDURE EVALUATION
04/23/2019  Pilar Hernandez is a 34 y.o., female.    Anesthesia Evaluation    I have reviewed the Patient Summary Reports.    I have reviewed the Nursing Notes.   I have reviewed the Medications.     Review of Systems  Anesthesia Hx:  No problems with previous Anesthesia Denies Hx of Anesthetic complications  History of prior surgery of interest to airway management or planning: Denies Family Hx of Anesthesia complications.   Denies Personal Hx of Anesthesia complications.   Social:  Non-Smoker, Alcohol Use    Hematology/Oncology:     Oncology Normal    -- Anemia:   EENT/Dental:EENT/Dental Normal   Cardiovascular:  Cardiovascular Normal Exercise tolerance: good     Pulmonary:  Pulmonary Normal    Renal/:  Renal/ Normal     Hepatic/GI:   S/p Vertical sleeive   Musculoskeletal:  Musculoskeletal Normal    Neurological:  Neurology Normal    Endocrine:  Endocrine Normal    Psych:  Psychiatric Normal           Physical Exam  General:  Well nourished    Airway/Jaw/Neck:  Airway Findings: Mouth Opening: Normal Tongue: Normal  General Airway Assessment: Adult  Mallampati: III  TM Distance: Normal, at least 6 cm     Eyes/Ears/Nose:  Eyes/Ears/Nose Findings:    Dental:  Dental Findings: In tact        Mental Status:  Mental Status Findings:  Cooperative, Alert and Oriented         Anesthesia Plan  Type of Anesthesia, risks & benefits discussed:  Anesthesia Type:  general  Patient's Preference:   Intra-op Monitoring Plan: standard ASA monitors  Intra-op Monitoring Plan Comments:   Post Op Pain Control Plan: multimodal analgesia, IV/PO Opioids PRN and per primary service following discharge from PACU  Post Op Pain Control Plan Comments:   Induction:   IV  Beta Blocker:  Patient is not currently on a Beta-Blocker (No further documentation required).       Informed Consent: Patient understands risks and  agrees with Anesthesia plan.  Questions answered. Anesthesia consent signed with patient.  ASA Score: 2     Day of Surgery Review of History & Physical: I have interviewed and examined the patient. I have reviewed the patient's H&P dated: 04/23/19. There are no significant changes.   H&P completed by Anesthesiologist.       Ready For Surgery From Anesthesia Perspective.

## 2019-04-23 NOTE — H&P
[facility]    Obstetrics & Gynecology  History & Physical      Patient Name:  Pilar Hernandez  MRN:  [id number]  Admission Date:  2019  Hospital Length of Stay:  0  Attending Physician:  Julio Cesar Elias MD     Date:  2019    Chief Complaint:  Contractions     History of Present Illness:      Pilar Hernandez is a 34 y.o.  at 38w6d admitted to L&D for labor management.  Pt reports contractions since earlier this morning now with increasing frequency and intensity.  She denies any vaginal bleeding, leakage of fluid, and notes an active fetus.  Pt has had prenatal care and her antepartum course has been fairly uneventful.  Other than her labor complaints, she has no other major complaints.    Past Surgical History:     Vertical sleeve - 2013    Medications:     Prenatal vitamins   Fergon   Colace    Allergies:     NKDA      Obstetric History:          T1      L1     SAB0   TAB0   Ectopic0   Multiple0   Live Births1       # Outcome Date GA Lbr Urban/2nd Weight Sex Delivery Anes PTL Lv   1 Term 18 39w4d / 03:38 3.24 kg (7 lb 2.3 oz) F Vag-Spont EPI N NICOLE      Name: BERNARDO, LEANA CASSIDY      Apgar1:  8                Apgar5: 9     Gynecologic History:      Denies recent/active STI  Denies history abnormal Pap     Social History:      Denies tobacco, alcohol or illicit drug use  Current partner is father of baby  Denies domestic abuse     Family History:     Denies congenital anomalies, inherited syndromes, fetal aneuploidy    Review of Systems   Constitutional: Negative.    Eyes: Negative.    Respiratory: Negative.    Gastrointestinal: Positive for abdominal pain.   Endocrine: Negative.    Genitourinary: Negative.    Musculoskeletal: Positive for back pain.   Integumentary:  Negative.   Neurological: Negative.    Hematological: Negative.    Psychiatric/Behavioral: Negative.    Breast: negative.       Vitals:    BP: (106)/(78) 106/78    Physical  Exam:   Constitutional: She appears well-developed. No distress.                             Alert and oriented to person, place and time.  HEENT:  Normocephalic, atraumatic, anicteric, moist mucus membranes.  Neck supple without masses.  LUNGS:  Clear to auscultation bilaterally.  HEART:  Regular rate & rhythm with physiologic heart sounds.  ABDOMEN:  Soft, non tender without any guarding, rigidity or rebound. Normoactive bowel sounds.  PELVIC:  Normal female external genitalia without gross lesions, rashes or excoriations. No gross vaginal or cervical lesions.  Adequate pelvis.  Cervix: 6 cm with bulging bag.  No bleeding or LOF.  EXTREMITIES:  Symmetric without cramping, claudication. Trace edema LE. +2 distal pulses.  Full range of motion.  SKIN:  No rashes, good turgor & capillary refill.  NEUROLOGIC:  Grossly intact bilaterally. +2 DTR, symmetric.  PSYCH:  Mood & affect appropriate.       NST:    Category: 2  Tocometry: irregular ctx    Assessment:     1. 34 y.o.  at 38w6d in active labor  2. Anemia, iron deficiency      Plan:    Admit to L&D for active management of labor  The risks, benefits, alternatives and possible complications to vaginal delivery, operative vaginal delivery,  delivery and blood transfusion were explained to the patient in great detail including pre-admission, admission, and post-admission procedures and expectations.  All of the patients questions were answered to her satisfaction.  She voiced understanding and acceptance of these risks.  Informed consent was obtained for delivery and blood transfusions.    Routine admit labs    Continuous fetal monitoring    Consult anesthesia, epidural PRN    Fe supplementation      Julio Cesar Elias MD

## 2019-04-23 NOTE — ANESTHESIA PROCEDURE NOTES
Epidural    Patient location during procedure: OB   Reason for block: primary anesthetic   Diagnosis: Labor    Start time: 4/23/2019 9:06 AM  Timeout: 4/23/2019 9:06 AM  End time: 4/23/2019 9:25 AM  Staffing  Anesthesiologist: Shon Ojeda MD  Performed: anesthesiologist   Preanesthetic Checklist  Completed: patient identified, site marked, surgical consent, pre-op evaluation, timeout performed, IV checked, risks and benefits discussed, monitors and equipment checked, anesthesia consent given, hand hygiene performed and patient being monitored  Preparation  Patient position: sitting  Prep: Betadine  Patient monitoring: Pulse Ox and Blood Pressure  Epidural  Skin Anesthetic: lidocaine 1%  Skin Wheal: 3 mL  Administration type: single shot  Approach: midline  Interspace: L3-4    Injection technique: JAYLEEN saline  Needle and Epidural Catheter  Needle type: Tuohy   Needle gauge: 17  Needle length: 3.5 inches  Needle insertion depth: 7 cm  Catheter type: springwBoxTone  Catheter size: 19 G  Catheter at skin depth: 12.5 cm  Test dose: 3 mL of lidocaine 1.5% with Epi 1-to-200,000  Additional Documentation: incremental injection, negative aspiration for heme and CSF, no paresthesia on injection, no signs/symptoms of IV or SA injection, no significant pain on injection and no significant complaints from patient  Needle localization: anatomical landmarks and paresthesias

## 2019-04-24 LAB
BASOPHILS # BLD AUTO: 0.02 K/UL (ref 0–0.2)
BASOPHILS NFR BLD: 0.2 % (ref 0–1.9)
DIFFERENTIAL METHOD: ABNORMAL
EOSINOPHIL # BLD AUTO: 0.1 K/UL (ref 0–0.5)
EOSINOPHIL NFR BLD: 0.7 % (ref 0–8)
ERYTHROCYTE [DISTWIDTH] IN BLOOD BY AUTOMATED COUNT: 13.7 % (ref 11.5–14.5)
HCT VFR BLD AUTO: 32.3 % (ref 37–48.5)
HGB BLD-MCNC: 10.6 G/DL (ref 12–16)
LYMPHOCYTES # BLD AUTO: 1.8 K/UL (ref 1–4.8)
LYMPHOCYTES NFR BLD: 17.3 % (ref 18–48)
MCH RBC QN AUTO: 27.8 PG (ref 27–31)
MCHC RBC AUTO-ENTMCNC: 32.8 G/DL (ref 32–36)
MCV RBC AUTO: 85 FL (ref 82–98)
MONOCYTES # BLD AUTO: 0.8 K/UL (ref 0.3–1)
MONOCYTES NFR BLD: 7.4 % (ref 4–15)
NEUTROPHILS # BLD AUTO: 7.8 K/UL (ref 1.8–7.7)
NEUTROPHILS NFR BLD: 74.2 % (ref 38–73)
PLATELET # BLD AUTO: 156 K/UL (ref 150–350)
PMV BLD AUTO: 11.1 FL (ref 9.2–12.9)
RBC # BLD AUTO: 3.81 M/UL (ref 4–5.4)
WBC # BLD AUTO: 10.56 K/UL (ref 3.9–12.7)

## 2019-04-24 PROCEDURE — 36415 COLL VENOUS BLD VENIPUNCTURE: CPT

## 2019-04-24 PROCEDURE — 11000001 HC ACUTE MED/SURG PRIVATE ROOM

## 2019-04-24 PROCEDURE — 85025 COMPLETE CBC W/AUTO DIFF WBC: CPT

## 2019-04-24 PROCEDURE — 25000003 PHARM REV CODE 250: Performed by: OBSTETRICS & GYNECOLOGY

## 2019-04-24 RX ADMIN — IBUPROFEN 600 MG: 600 TABLET ORAL at 03:04

## 2019-04-24 NOTE — ANESTHESIA POSTPROCEDURE EVALUATION
Anesthesia Post Evaluation    Patient: Pilar Hernandez    Procedure(s) Performed: Epidural  Final Anesthesia Type: epidural  Patient location during evaluation: labor & delivery  Patient participation: Yes- Able to Participate  Level of consciousness: awake and alert, awake and oriented  Post-procedure vital signs: reviewed and stable  Pain management: adequate  Airway patency: patent  PONV status at discharge: No PONV  Anesthetic complications: no      Cardiovascular status: blood pressure returned to baseline, hemodynamically stable and stable  Respiratory status: unassisted  Hydration status: euvolemic  Follow-up not needed.          Vitals Value Taken Time   /79 4/24/2019  7:18 AM   Temp 35.9 °C (96.7 °F) 4/24/2019  7:18 AM   Pulse 76 4/24/2019  7:18 AM   Resp 18 4/24/2019  7:18 AM   SpO2 98 % 4/24/2019  7:18 AM           Pain/Tash Score: Pain Rating Prior to Med Admin: 4 (4/24/2019  3:48 AM)

## 2019-04-25 VITALS
DIASTOLIC BLOOD PRESSURE: 81 MMHG | BODY MASS INDEX: 32.74 KG/M2 | RESPIRATION RATE: 16 BRPM | OXYGEN SATURATION: 99 % | TEMPERATURE: 97 F | SYSTOLIC BLOOD PRESSURE: 134 MMHG | HEIGHT: 68 IN | HEART RATE: 63 BPM | WEIGHT: 216 LBS

## 2019-04-25 RX ORDER — IBUPROFEN 600 MG/1
600 TABLET ORAL EVERY 6 HOURS PRN
Qty: 40 TABLET | Refills: 0 | Status: SHIPPED | OUTPATIENT
Start: 2019-04-25 | End: 2019-05-22

## 2019-04-25 NOTE — PROGRESS NOTES
"[facility]    Obstetrics & Gynecology  Progress Note      Patient Name:  Pilar Hernandez  MRN:  [id number]  Admission Date:  2019  Hospital Length of Stay:  1  Attending Physician:  Julio Cesar Elias MD  Primary Care Provider:  Charly Robin Jr, MD    Subjective:     Date:  2019    Principal Problem:   (spontaneous vaginal delivery)    Hospital Course:  Post Partum Day # 1 - s/p vaginal delivery at 38w6d    Patient without major complaints  No acute events overnight  Denies dizziness, SOB, QUIÑONES, or CP  Pain well controlled  Lochia less than menses  Bottle/breast feeding   Breast non-tender, non-engorged  Ambulating, tolerating regular diet, and voiding without diffculty  Bonding well with baby    Objective:     Temp:  [96.7 °F (35.9 °C)-98.6 °F (37 °C)] 98.6 °F (37 °C)  Pulse:  [68-80] 69  Resp:  [18] 18  SpO2:  [97 %-99 %] 99 %  BP: (119-135)/(75-89) 135/85    /85 (BP Location: Right arm, Patient Position: Sitting)   Pulse 69   Temp 98.6 °F (37 °C) (Oral)   Resp 18   Ht 5' 8" (1.727 m)   Wt 98 kg (216 lb)   LMP 2018   SpO2 99%   Breastfeeding? No   BMI 32.84 kg/m²     Intake/Output Summary (Last 24 hours) at 2019 2333  Last data filed at 2019 0437  Gross per 24 hour   Intake 1640 ml   Output 1000 ml   Net 640 ml   Clear, helio urine    Physical Exam:   Constitutional: She appears well-developed. No distress.                             Alert and oriented x 3.   HEENT:  No scleral icterus. Neck supple. Moist mucus membranes.   LUNGS:  Clear to auscultation bilaterally.   HEART:  Regular rate and rhythm with physiologic heart sounds.   ABDOMEN:  Soft, non-tender, non-distended, no guarding, rigidity, or rebound with normoactive bowel sounds.   FUNDUS:  Firm, nontender below the umbilicus.   EXTREMITIES:  No cramping, claudication.  Trace edema LE. +2 distal pulses. Symmetric, full range of motion, negative Gilbert.  NEUROLOGIC:  Grossly intact " bilaterally. +2 DTR's.   PSYCH:  Mood and affect appropriate.   PERINEUM:  Intact with light lochia.    Labs:      Recent Results (from the past 336 hour(s))   CBC auto differential    Collection Time: 04/24/19  6:54 AM   Result Value Ref Range    WBC 10.56 3.90 - 12.70 K/uL    Hemoglobin 10.6 (L) 12.0 - 16.0 g/dL    Hematocrit 32.3 (L) 37.0 - 48.5 %    Platelets 156 150 - 350 K/uL   CBC with Auto Differential    Collection Time: 04/23/19  8:23 AM   Result Value Ref Range    WBC 7.05 3.90 - 12.70 K/uL    Hemoglobin 11.0 (L) 12.0 - 16.0 g/dL    Hematocrit 33.5 (L) 37.0 - 48.5 %    Platelets 151 150 - 350 K/uL     ABO:  O POS    Assessment:     1. Post-partum day # 1 - IUP at 38w6d s/p spontaneous vaginal delivery - progressing well  2. Anemia, iron deficiency     Plan:     Continue post-partum care  Review post-partum care instructions and precautions  Encourage ambulation  Encourage breast-feeding  Iron supplementation, anemia precautions  Delivery findings, labs/studies, and expectations were discussed with pt.  All questions answered and pt voiced understanding.     Disposition:  As patient meets milestones, will plan to discharge.      Julio Cesar Elias MD

## 2019-04-25 NOTE — PLAN OF CARE
Problem: Adult Inpatient Plan of Care  Goal: Plan of Care Review  PT progressing well. NAD noted. VSS. Pain well controlled.POC discussed with pt. Understanding verbalized.

## 2019-04-25 NOTE — DISCHARGE SUMMARY
[facility]    Discharge Summary  Obstetrics & Gynecology      Patient Name:  Pilar Hernandez  MRN:  [id number]  Admission Date:  2019  Discharge Date:  2019  Hospital Length of Stay:  2  Attending Physician:  Julio Cesar Elias MD  Discharging Physician:  Julio Cesar Elias MD  Primary Care Provider:  Charly Robin Jr, MD  Date of Delivery:  2019    Admitting Diagnosis:       [place] gestation at 38w6d  Active labor    Discharge Diagnosis:    [place] gestation at term s/p spontaneous vaginal delivery     Procedure performed:      Vaginal delivery    Brief Hospital Course:      See History & Physical for complete details.  In brief, Pilar Hernandez is a 34 y.o.  at 38w6d gestation who presented to L&D in active labor.  Pt was admitted to L&D for active management of labor.  Pt progressed well through the active phase of labor and delivered a viable infant.  Maternal and fetal status was overall reassuring throughout the labor course.  SROM at 2019 shortly prior to delivery with moderate, clear fluid, no odor and had no intrapartum fever.  See delivery note for further details.  Following delivery, pt was transferred to mother baby unit for routine post-partum care.  Pt had a fairly uncomplicated postpartum course.  Prior to discharge, she was without major complaints.  Pt pain was well controlled.  Pt was ambulating, tolerating a regular diet, voiding without difficulty, and bonding well with baby.  Lochia was light.  Vital signs where physiologic and stable.  Physical exam showed no acute findings.  Pt had satisfied routine postpartum discharge criteria and expressed the desire to be discharge from the hospital.     Pertinent Labs or Studies:      Recent Results (from the past 336 hour(s))   CBC auto differential    Collection Time: 19  6:54 AM   Result Value Ref Range    WBC 10.56 3.90 - 12.70 K/uL    Hemoglobin 10.6 (L) 12.0 - 16.0 g/dL     "Hematocrit 32.3 (L) 37.0 - 48.5 %    Platelets 156 150 - 350 K/uL   CBC with Auto Differential    Collection Time: 04/23/19  8:23 AM   Result Value Ref Range    WBC 7.05 3.90 - 12.70 K/uL    Hemoglobin 11.0 (L) 12.0 - 16.0 g/dL    Hematocrit 33.5 (L) 37.0 - 48.5 %    Platelets 151 150 - 350 K/uL     ABO:  O POS    Discharge Exam:      Temp:  [97 °F (36.1 °C)-98.6 °F (37 °C)] 97 °F (36.1 °C)  Pulse:  [58-69] 63  Resp:  [16-20] 16  SpO2:  [99 %] 99 %  BP: (121-135)/(77-85) 134/81    /81 (BP Location: Left arm, Patient Position: Lying)   Pulse 63   Temp 97 °F (36.1 °C) (Oral)   Resp 16   Ht 5' 8" (1.727 m)   Wt 98 kg (216 lb)   LMP 07/25/2018   SpO2 99%   Breastfeeding? No   BMI 32.84 kg/m²     GENERAL:  No acute distress. Alert and oriented x 3.   HEENT:  Normocephalic. No scleral icterus. Neck supple. Moist mucus membranes.   LUNGS:  Clear to auscultation bilaterally.   HEART:  Regular rate and rhythm with physiologic heart sounds.   ABDOMEN:  Soft, non-tender, non-distended, no guarding, rigidity, or rebound.   FUNDUS:  Firm, nontender below the umbilicus.   EXTREMITIES:  No cramping, claudication.  Trace edema. Good skin turgor. +2 distal pulses, symmetric. Full range of motion.   NEUROLOGIC:  Grossly intact bilaterally.   PSYCH:  Mood and affect appropriate.   PERINEUM:  Intact with light lochia.    Discharge Condition:  Stable      Discharge Disposition:  Home       Discharge Medications:     docusate sodium (COLACE) 100 MG capsule  Take 1 capsule (100 mg total) by mouth 2 (two) times daily as needed for Constipation.     ferrous sulfate (FEOSOL) 325 mg (65 mg iron) Tab tablet  Take 1 tablet (325 mg total) by mouth 2 (two) times daily.     ibuprofen (ADVIL,MOTRIN) 600 MG tablet  Take 1 tablet (600 mg total) by mouth every 6 (six) hours as needed.     prenat.vits,ortega,min-iron-folic (PRENATAL VITAMIN) Tab  Take 1 tablet by mouth once daily.       Discharge Activites:      Resume activities as " tolerated with adequate rest  Pelvic rest for 6 weeks   No heavy lifting greater 10 lbs or strenuous activities   Showers only  Wound care instructions and precautions given   Do NOT driving and operating machinery while taking narcotics or other sedative medications, pt voiced understanding.    Discharge Diet:  Regular diet    Discharge Instructions:      Pt was instructed to contact the clinic or emergency department for any concerns including but not limited to an increase in her lochia, abdominal pain, foul vaginal discharge, weakness, decrease activity level, decrease appetite, feeling sad or hopeless, inability to care for her baby, breast pain or infection, difficulty with voiding or having bowel movements, perineal pain or breakdown, wound breakdown, fever >100.4 or abnormal vital signs, shortness of breath, chest pain, dizziness or feeling faint, pain or swelling in her extremities, headaches not relieved with rest and Tylenol, vision changes, seizure activities, abnormal bleeding/bruising, or any other health concerns.  Post-partum care instructions and precautions, labs/studies, clinical/delivery findings, and hospital course reviewed with the patient prior to discharge.  All of her questions were answered to her satisfaction.  Pt voiced understanding.       Follow-up Visit:  4 weeks for postpartum visit, or sooner if any problems.       Julio Cesar Elias MD

## 2019-04-25 NOTE — PROGRESS NOTES
"[facility]    Obstetrics & Gynecology  Progress Note      Patient Name:  Pilar Hernandez  MRN:  [id number]  Admission Date:  2019  Hospital Length of Stay:  2  Attending Physician:  Julio Cesar Elias MD  Primary Care Provider:  Charly Robin Jr, MD    Subjective:     Date:  2019    Principal Problem:   (spontaneous vaginal delivery)    Hospital Course:  Post Partum Day # 2 - s/p vaginal delivery at 38w6d    Pt has no major complaints today.  Denies dizziness, SOB, QUIÑONES, or CP  Requesting to go home  Pain well controlled  Lochia less than menses  Bottle/breast feeding   Breast non-tender, non-engorged  Ambulating, tolerating regular diet, and voiding without diffculty  Bonding well with baby    Objective:     Temp:  [97 °F (36.1 °C)-98.6 °F (37 °C)] 97 °F (36.1 °C)  Pulse:  [58-70] 63  Resp:  [16-20] 16  SpO2:  [99 %] 99 %  BP: (121-135)/(77-85) 134/81    /81 (BP Location: Left arm, Patient Position: Lying)   Pulse 63   Temp 97 °F (36.1 °C) (Oral)   Resp 16   Ht 5' 8" (1.727 m)   Wt 98 kg (216 lb)   LMP 2018   SpO2 99%   Breastfeeding? No   BMI 32.84 kg/m²   No intake or output data in the 24 hours ending 19 1332Clear, helio urine    Physical Exam:   Constitutional: She appears well-developed. No distress.                             Alert and oriented x 3.   HEENT:  No scleral icterus. Neck supple. Moist mucus membranes.   LUNGS:  Clear to auscultation bilaterally.   HEART:  Regular rate and rhythm with physiologic heart sounds.   ABDOMEN:  Soft, non-tender, non-distended, no guarding, rigidity, or rebound with normoactive bowel sounds.   FUNDUS:  Firm, nontender below the umbilicus.   EXTREMITIES:  No cramping, claudication.  Trace edema LE. +2 distal pulses. Symmetric, full range of motion, negative Gilbert.  NEUROLOGIC:  Grossly intact bilaterally. +2 DTR's.   PSYCH:  Mood and affect appropriate.   PERINEUM:  Intact with light " gustavo.    Assessment:     1. Post-partum day # 2 - IUP at 38w6d s/p spontaneous vaginal delivery - progressing well  2. Anemia, iron deficiency     Plan:     Plan for discharge today.     Iron supplementation, anemia precautions    Reviewed discharge medications.  Pt was instructed to avoid driving or operate heavy machinery while taking narcotics or other medications with sedative properties.    Post-partum care instructions and precautions, clinical/delivery findings, and hospital course reviewed with pt prior to discharge.  All of her questions were answered to her satisfaction.  Pt voiced understanding and agrees with discharge.    Return to clinic in 4 weeks for post-partum visit or sooner if any concerns.  Go to ER for any emergencies.      Julio Cesar Elias MD

## 2019-04-25 NOTE — PROGRESS NOTES
AAOX3  Denies any needs and no distress noted.  VSS.  DC and Rx instructions given written or oral.  Verbalized understanding.

## 2019-04-25 NOTE — DISCHARGE INSTRUCTIONS
After a Vaginal Birth  After having a baby, your body may be very tired. It can take time to recover from a vaginal delivery. You may stay in the hospital or birth center from 1 to 4 days. In some cases, you may be able to go home the same day.    Right after the delivery  Your temperature and blood pressure will be taken until they are stable. A nurse or other healthcare provider will observe you as you rest. You may have afterbirth pains. These are cramps caused by the uterus shrinking. Sanitary pads are used to soak up the discharge of the uterine lining. To make sure that you arent bleeding too much, the pad will be checked. And the firmness of your uterus will be checked. To do this, a nurse will gently push down on your stomach. If you had anesthesia, youll be watched closely until you can feel and move your toes. If you have perineal pain (pain between the vagina and anus), an ice pack can help.  [place] care  While still in the hospital or birth center, youll learn how to hold and feed your baby. You will also be given instructions on how to care for your baby. This includes bathing and feeding.   Preparing to go home  You may be anxious to go home as soon as possible. Before you and your baby go home, a healthcare provider will check to be sure you are healthy enough to take care of your baby and yourself. Youre ready to go home when:  · You can walk to the bathroom and use the bathroom without help.  · You can eat solid food and swallow pills (if needed).  · You have no sign of infection or other health problems, including fever.   · You have adequate pain control.  · Your bleeding isn't excessive.  · You are able to care for your  and are emotionally stable.  Before leaving the hospital or birth center, youll be given written instructions for home self-care after vaginal delivery. Be sure to follow these instructions carefully. If you have questions or concerns, talk about them  now.  If you have stitches  You may have received stitches in the skin near your vagina. The stitches might have closed an episiotomy (an incision that enlarges the opening of the vagina). Or you may have needed stitches to repair torn skin. Either way, your stitches should dissolve within weeks. Until then, you can help reduce discomfort, aid healing, and reduce your risk of infection by keeping the stitches clean. These tips can help:  · Gently wipe from front to back after you urinate or have a bowel movement.  · After wiping, spray warm water on the area. Or you can have a sitz bath. This means sitting in a tub with a few inches of water in it. Then pat the area dry or use a hairdryer on a cool setting.  · Do not use soap or any solution except water on the area.  · You can take a shower unless told not to.  · Change sanitary pads at least every 2 to 4 hours.  · Place cold or heat packs on the area as directed by your healthcare providers or nurses. Keep a thin towel between the pack and your skin.  · Sit on firm seats so the stitches pull less.   follow-up  Schedule a  follow-up exam with your healthcare provider for about 6 weeks after delivery. During this exam, your uterus and vaginal area will be checked. Contact your healthcare provider if you think you or your baby are having any problems.  When to call your healthcare provider  Call your health care provider right away if you have:  · A fever of 100.4°F (38.0°C) or higher  · Bleeding that requires a new sanitary pad after an hour, or large blood clots  · Pain in your vagina that gets worse and isn't relieved with medicine  · Swelling, discharge, or increased pain from vaginal tear or episiotomy  · Burning, pain, red streaks, or lumpy areas in your breasts that may be accompanied by flu-like symptoms  · Cracks, blisters, or blood on your nipples  · Burning or pain when you urinate  · Nausea or vomiting  · Dizziness or  fainting  · Feelings of extreme sadness or anxiety, or a feeling that you dont want to be with your baby  · Abdominal pain that isnt relieved with medicine  · Vaginal discharge that has a bad odor  · No bowel movement for 5 days  · Painful urination, orinability to control urination  · Redness, warmth, or pain in the lower leg  · Chest pain   Date Last Reviewed: 8/2/2015  © 9352-2333 [employer]. [address] [place] [address], [place] [address]. All rights reserved. This information is not intended as a substitute for professional medical care. Always follow your healthcare professional's instructions.        After Delivery: When to Call the Healthcare Provider  Health problems sometimes arise with you or your baby following delivery. Call your baby's healthcare provider or your healthcare provider if you see any of the signs below.      Watch your baby for these signs  Call your babys healthcare provider if your baby:  · Has a fever (see Fever and children, below)  · Has fewer than 6 wet diapers a day (Hint: Disposable diapers may feel heavy or hard after being soaked.)  · Skin or whites of the eyes appear yellow  · Cries for a long time, or if it sounds as if the cries are caused by pain  · Has diarrhea  · Refuses 2 feedings in a row  · Is inactive or listless  · Is vomiting  · Has blood in the stool or vomit  · Has a rash  · Has ear drainage  · Has trouble breathing  · Has a seizure  · Will not wake up  Trust your instincts. If you are concerned about your baby, call your baby's healthcare provider.  Fever and children  Always use a digital thermometer to check your childs temperature. Never use a mercury thermometer.  For infants and toddlers, be sure to use a rectal thermometer correctly. A rectal thermometer may accidentally poke a hole in (perforate) the rectum. It may also pass on germs from the stool. Always follow the product makers directions for proper use. If you dont feel comfortable  taking a rectal temperature, use another method. When you talk to your childs healthcare provider, tell him or her which method you used to take your childs temperature.  Here are guidelines for fever temperature. Ear temperatures arent accurate before 6 months of age. Dont take an oral temperature until your child is at least 4 years old.  Infant under 3 months old:  Ask your childs healthcare provider how you should take the temperature.  · Rectal or forehead (temporal artery) temperature of 100.4°F (38°C) or higher, or as directed by the provider   Watch your own health for these signs  Call your own healthcare provider if you have:  · Burning or pain in your breasts  · Red streaks or hard lumpy areas in your breasts  · Problems with breastfeeding  · A fever of 100.4°F (38°C) or higher, or as directed by your healthcare provider  · Extreme tiredness or body aches, as if you have the flu  · Feelings of extreme sadness or anxiety, or a feeling that you dont want to be with your baby  · Abdominal pain that isnt relieved with medicine  · Vaginal discharge that has a bad odor  · Vaginal bleeding that soaks more than one pad per hour  · If you had a  section, call for concerns about your incision site such as pain, drainage, or bleeding from your incision  Date Last Reviewed: 2016-2017 [employer]. [address] [place] [address], [place] [address]. All rights reserved. This information is not intended as a substitute for professional medical care. Always follow your healthcare professional's instructions.        Understanding Postpartum Depression  Youve just had a baby. You know you should be excited and happy. But instead you find yourself crying for no reason. You may have trouble coping with your daily tasks. You feel sad, tired, and hopeless most of the time. You may even feel ashamed or guilty. But what youre going through is not your fault and you can feel better. Talk to your  "healthcare provider. He or she can help.    What is depression?  Depression is a mood disorder that affects the way you think and feel. The most common symptom is a feeling of deep sadness. You may also feel as if you just cant cope with life. Other symptoms include:  · Gaining or losing a lot of weight  · Sleeping too much or too little  · Feeling tired all the time  · Feeling restless  · Crying a lot  · Having too little or too much appetite.  · Withdrawing from friends and family  · Having headaches, aches and pains, or stomach problems that won't go away.  · Fears of harming your baby  · Lack of interest in your baby  · Feeling worthless or guilty  · No longer finding pleasure in things you used to  · Having trouble thinking clearly or making decisions  · Thinking about death or suicide   Depression after childbirth  You may be weepy and tired right after giving birth. These feelings are normal. Theyre sometimes called the baby blues. These blues go away after 2 or 3 weeks. However, postpartum (meaning after birth) depression lasts much longer and is more severe than the "baby blues." It can make you feel sad and hopeless. You may also fear that your baby will be harmed and worry about being a bad mother.  What causes postpartum depression?  The exact cause of postpartum depression is unknown. Changes in brain chemistry or structure are believed to play a big role in depression. It may be due to changes in your hormones during and after childbirth. You may also be tired from caring for your baby and adjusting to being a mother. All these factors may make you feel depressed. In some cases, your genes may also play a role.  Depression can be treated  The good news is that there are many ways to treat postpartum depression. Talking to your healthcare provider is the first step toward feeling better.  Resources  · National [place] of Mental Himstq754-512-3485qzy.[facility].nih.gov  · National [place] on Mental " Ucxcjbf957-665-3398osl.bre.org  · Mental Health Uianhnt143-726-0450cjx.[facility].org  · National Suicide Qdvztzp234-429-8155 (800-SUICIDE)   Date Last Reviewed: 8/16/2015  © 1493-0009 [employer]. [address] [place] [address]. All rights reserved. This information is not intended as a substitute for professional medical care. Always follow your healthcare professional's instructions.

## 2019-05-22 ENCOUNTER — POSTPARTUM VISIT (OUTPATIENT)
Dept: OBSTETRICS AND GYNECOLOGY | Facility: CLINIC | Age: 34
End: 2019-05-22
Payer: COMMERCIAL

## 2019-05-22 VITALS
BODY MASS INDEX: 31.11 KG/M2 | SYSTOLIC BLOOD PRESSURE: 130 MMHG | WEIGHT: 205.25 LBS | HEIGHT: 68 IN | DIASTOLIC BLOOD PRESSURE: 90 MMHG

## 2019-05-22 DIAGNOSIS — Z30.09 FAMILY PLANNING: ICD-10-CM

## 2019-05-22 PROBLEM — Z3A.38 38 WEEKS GESTATION OF PREGNANCY: Status: RESOLVED | Noted: 2019-04-23 | Resolved: 2019-05-22

## 2019-05-22 PROCEDURE — 99999 PR PBB SHADOW E&M-EST. PATIENT-LVL III: ICD-10-PCS | Mod: PBBFAC,,, | Performed by: OBSTETRICS & GYNECOLOGY

## 2019-05-22 PROCEDURE — 99999 PR PBB SHADOW E&M-EST. PATIENT-LVL III: CPT | Mod: PBBFAC,,, | Performed by: OBSTETRICS & GYNECOLOGY

## 2019-05-22 PROCEDURE — 99499 NO LOS: ICD-10-PCS | Mod: S$GLB,,, | Performed by: OBSTETRICS & GYNECOLOGY

## 2019-05-22 PROCEDURE — 99499 UNLISTED E&M SERVICE: CPT | Mod: S$GLB,,, | Performed by: OBSTETRICS & GYNECOLOGY

## 2019-05-22 RX ORDER — NORELGESTROMIN AND ETHINYL ESTRADIOL 35; 150 UG/MG; UG/MG
1 PATCH TRANSDERMAL WEEKLY
Qty: 3 PATCH | Refills: 11 | Status: SHIPPED | OUTPATIENT
Start: 2019-05-22 | End: 2020-04-20

## 2019-05-22 NOTE — PROGRESS NOTES
"[facility]  Ambulatory Clinic  Obstetrics & Gynecology    Date of Visit:  2019    Chief Complaint:  Post-partum visit    Subjective:      Pilar Hernandez is a 34 y.o.  who is s/p spontaneous vaginal delivery at term here for post-partum visit.  Pt has no major complaints today and has been doing well since delivery.  Pt reports baby is doing well and she is breast/bottle feeding without difficulty.  Her lochia resolved.  Pt denies any abdominal/pelvic pain, fevers, abnormal vaginal discharge, symptoms of post-partum blues or depression, breast complaints, GI or urinary compliants.  Pt is undecided on birth control at this time.    Review of Systems:      CONSTITUTIONAL:  No fever, chills, weakness, fatigue, decreased activity  HEENT: No headaches, vision changes  LUNGS:  No shortness of breath  HEART:  No palpitations, chest pain, abnormal swelling  BREAST:  No lump, pain, redness, skin changes  GI:  No nausea, vomiting, diarrhea, constipation, abdomen pain, blood in stool  URINARY:  No dysuria, hematuria, frequency  SKIN:  No rash, bruising  NEURO:  No headache, weakness  PSYCH:  No anxiety, depression, suicidal or homicidal ideations    Objective:     BP (!) 130/90 (BP Location: Right arm)   Ht 5' 8" (1.727 m)   Wt 93.1 kg (205 lb 4 oz)   LMP 2018   BMI 31.21 kg/m²      GENERAL:  NAD, A&Ox3, well nourished.  HEENT:  NCAT,moist mucus membranes, neck supple.  BREAST:  Symmetric, non-tender, no abnormal masses, skin changes, or discharge.  LUNGS:  CTA-B.  HEART:  RRR with physiologic heart sounds.  ABDOMEN:  Soft, non-tender, non-distended without any guarding, rigidity or rebound. Normoactive bowel sounds.    EXT:  Symmetric. No cramping, claudication, or edema. +2 distal pulses. FROM.  SKIN:  No rashes, good turgor & capillary refill.  NEURO:  Grossly intact bilaterally. +2 DTR.  PSYCH:  Mood & affect appropriate.       PELVIC:  Normal female external genitalia " without any obvious lesions.  Perinuem intact.  Adequate perineal body.  Normal urethral meatus.  No gross lymphadenopathy.   Vagina:  Intact with good support, lochia resolved.     Cervix:  No cervical motion tenderness, discharge, or obvious lesions.    Uterus:  Small, non-tender, normal contour.    Adnexa:  No masses, non-tender.    Rectal:  Patient declined.  No obvious external lesions.     Chaperone present for exam.    Laboratory Data:     Lab Results   Component Value Date    WBC 10.56 2019    HGB 10.6 (L) 2019    HCT 32.3 (L) 2019    MCV 85 2019     2019     O POS    Assessment:     34 y.o.  s/p  at term - doing well post-partum    Plan:    Post-partum care instructions and precautions reviewed.  Pelvic rest x 6 wks post-partum.  Continue prenatal vitamins, fergon and colace.   Motrin and percocet as needed.      We discussed her contraceptive options.  After an extensive dicussion, the pt opted for ortho evra patch.  Risks, benefits, and alternatives to birth control patch reviewed.  We reviewed proper birth control patch initiation/usage including common and potential serious side effects.  She should back up the birth control with a condom during any cycle in which antibiotics are prescribed, and during the first cycle as well.  The need for additional protection, such as a condom, to prevent exposure to sexually transmitted diseases has also been discussed.  Risk of tobacco use with birth control also discussed.     F/u with PCP for health maintenance.  Encourage healthy lifestyle modifications.    Return 3 months for well woman GYN exam, or sooner as needed, pt advised to call clinic to schedule.  Pt voiced understanding.       Julio Cesar Elias MD

## 2019-06-14 ENCOUNTER — TELEPHONE (OUTPATIENT)
Dept: OBSTETRICS AND GYNECOLOGY | Facility: CLINIC | Age: 34
End: 2019-06-14

## 2019-06-14 NOTE — TELEPHONE ENCOUNTER
----- Message from Esvinnava StoutDeann sent at 6/14/2019  2:44 PM CDT -----  Contact: Self/[phone]  Type: Patient Call Back    Who called:Patient    What is the request in detail:The patient would like a return to work notice. Returning to work on 6/21.    Would the patient rather a call back or a response via [facility]? Call back    Best call back number:[phone]      Thank you

## 2019-06-17 ENCOUNTER — TELEPHONE (OUTPATIENT)
Dept: OBSTETRICS AND GYNECOLOGY | Facility: CLINIC | Age: 34
End: 2019-06-17

## 2019-06-17 NOTE — TELEPHONE ENCOUNTER
----- Message from Elana Owen sent at 6/17/2019  4:30 PM CDT -----  Contact: pt  Type: Patient Call Back    Who called:pt    What is the request in detail:pt wants to discuss maternity leave. Call pt    Can the clinic reply by [facility]?    Would the patient rather a call back or a response via [facility]? Call back    Best call back number:[phone]    Additional Information:

## 2019-06-18 ENCOUNTER — TELEPHONE (OUTPATIENT)
Dept: OBSTETRICS AND GYNECOLOGY | Facility: CLINIC | Age: 34
End: 2019-06-18

## 2019-06-18 NOTE — TELEPHONE ENCOUNTER
----- Message from Julio Cesar Elias MD sent at 6/17/2019 11:51 PM CDT -----  Contact: pt  Please give pt letter.  Thanks.    ----- Message -----  From: Adry Elias LPN  Sent: 6/17/2019   4:36 PM  To: Julio Cesar Elias MD    Pt is requesting return to work letter. Is it okay for me to write one?      ----- Message -----  From: Elana Owen  Sent: 6/17/2019   4:30 PM  To: [facility] Staff    Type: Patient Call Back    Who called:pt    What is the request in detail:pt wants to discuss maternity leave. Call pt    Can the clinic reply by [facility]?    Would the patient rather a call back or a response via [facility]? Call back    Best call back number:[phone]    Additional Information:

## 2019-09-30 ENCOUNTER — OFFICE VISIT (OUTPATIENT)
Dept: OBSTETRICS AND GYNECOLOGY | Facility: CLINIC | Age: 34
End: 2019-09-30
Payer: MEDICAID

## 2019-09-30 VITALS
BODY MASS INDEX: 31.57 KG/M2 | SYSTOLIC BLOOD PRESSURE: 122 MMHG | WEIGHT: 208.31 LBS | HEIGHT: 68 IN | DIASTOLIC BLOOD PRESSURE: 88 MMHG

## 2019-09-30 DIAGNOSIS — Z01.419 WELL WOMAN EXAM WITH ROUTINE GYNECOLOGICAL EXAM: Primary | ICD-10-CM

## 2019-09-30 DIAGNOSIS — Z12.4 CERVICAL CANCER SCREENING: ICD-10-CM

## 2019-09-30 PROCEDURE — 99395 PREV VISIT EST AGE 18-39: CPT | Mod: S$PBB,,, | Performed by: OBSTETRICS & GYNECOLOGY

## 2019-09-30 PROCEDURE — 99999 PR PBB SHADOW E&M-EST. PATIENT-LVL III: ICD-10-PCS | Mod: PBBFAC,,, | Performed by: OBSTETRICS & GYNECOLOGY

## 2019-09-30 PROCEDURE — 99395 PR PREVENTIVE VISIT,EST,18-39: ICD-10-PCS | Mod: S$PBB,,, | Performed by: OBSTETRICS & GYNECOLOGY

## 2019-09-30 PROCEDURE — 87624 HPV HI-RISK TYP POOLED RSLT: CPT

## 2019-09-30 PROCEDURE — 88175 CYTOPATH C/V AUTO FLUID REDO: CPT

## 2019-09-30 PROCEDURE — 99999 PR PBB SHADOW E&M-EST. PATIENT-LVL III: CPT | Mod: PBBFAC,,, | Performed by: OBSTETRICS & GYNECOLOGY

## 2019-09-30 PROCEDURE — 99213 OFFICE O/P EST LOW 20 MIN: CPT | Mod: PBBFAC | Performed by: OBSTETRICS & GYNECOLOGY

## 2019-09-30 NOTE — PROGRESS NOTES
"[facility]  Outpatient Clinic  Obstetrics & Gynecology    Visit Date:  2019    Chief Complaint:  Annual GYN exam    Subjective:      Pilar Hernandez is a 34 y.o.  here for a gynecologic exam. Pt has no major complaints today. Menses are regular/monthly, not heavy or painful. Pt tolerating ortho evra patch well. Last pap was 10/2013 normal. Pt denies active STD. Pt performs monthly self breast examination, non-smoker, uses seat belts, and denies abuse. Pt denies abnormal vaginal bleeding, vaginal discharge, dysmenorrhea, dyspareunia, pelvic pain, bloating, early satiety, unintentional weight loss, breast mass/skin changes, incontinence, GI or urinary complaints. Otherwise, the pt is in her usual state of health.    Review of Systems:    GENERAL:  No fever, fatigue, excessive weight gain or loss  HEENT:  No head injury, headaches, hearing changes, visual disturbance  RESPIRATORY:  No cough, shortness of breath  CARDIOVASCULAR:  No chest pain, heart palpitations, leg swelling  BREAST:  No lump, pain, nipple discharge, skin changes  GASTROINTESTINAL:  No nausea, vomiting, constipation, diarrhea, abd pain, rectal bleeding   URINARY:  No dysuria, frequency, hematuria  GENITOURINARY:  See HPI  ENDOCRINE:  No heat or cold intolerance  HEMATOLOGIC:  No easy bruisability or bleeding   LYMPHATICS:  No enlarged nodes  MUSCULOSKELETAL:  No acute joint pain or swelling  SKIN:  No rash, lesions, jaundice  NEUROLOGIC:  No dizziness, weakness, syncope  PSYCHIATRIC:  No significant mood changes, suicidal or homicidal ideations    Objective:     /88 (BP Location: Right arm, Patient Position: Sitting, BP Method: Medium (Manual))   Ht 5' 8" (1.727 m)   Wt 94.5 kg (208 lb 5.4 oz)   LMP 2019 (Exact Date)   Breastfeeding? No   BMI 31.68 kg/m²      GENERAL:  NAD, well-nourished  HEENT:  NCAT, moist mucus membranes, neck supple.  BREAST:  Symmetric, non-tender, no obvious masses, no " skin changes, no nipple discharge.  LUNGS:  CTA-B  HEART:  RRR, no murmurs, gallops, or rubs  ABDOMEN:  Soft, non-tender. Normoactive BS. No obvious organomegaly.  EXT:  Symmetric w/o cramping, claudication, or edema. +2 distal pulses.  SKIN:  No rashes or bruising  NEURO:  Grossly intact bilaterally  PSYCH:  Mood and affect appropriate  GENITOURINARY:  NFEG no lesion. No vaginal or cervical lesion. No bleeding or discharge. Good support. No CMT. Uterus and ovaries small, NT. Declined rectal exam. No obvious external lesions.    Chaperone present for exam.    Assessment:     34 y.o. :    1. Well woman gynecologic exam  2. Family planning - Ortho Evra birth control patch    Plan:    A routine gynecologic health assessment was performed with age appropriate counseling.  Cervical cancer screening - pap obtained.  STI screening - pt declined.  Continue Ortho Evra birth control patch.  Risks, benefits, and alternatives to Ortho Evra birth control patch discussed.    Encourage healthy lifestyle modifications, monthly self breast exams, Ca/Vit D, f/u with PCP for health maintenance.  Return 1 year for well woman exam, or sooner as needed.    Voiced understanding.      Julio Cesar Elias MD

## 2019-10-03 LAB
HPV HR 12 DNA CVX QL NAA+PROBE: NEGATIVE
HPV16 AG SPEC QL: NEGATIVE
HPV18 DNA SPEC QL NAA+PROBE: NEGATIVE

## 2020-03-12 ENCOUNTER — OFFICE VISIT (OUTPATIENT)
Dept: FAMILY MEDICINE | Facility: CLINIC | Age: 35
End: 2020-03-12
Payer: COMMERCIAL

## 2020-03-12 ENCOUNTER — LAB VISIT (OUTPATIENT)
Dept: LAB | Facility: HOSPITAL | Age: 35
End: 2020-03-12
Payer: COMMERCIAL

## 2020-03-12 VITALS
SYSTOLIC BLOOD PRESSURE: 128 MMHG | HEIGHT: 68 IN | TEMPERATURE: 98 F | WEIGHT: 211 LBS | HEART RATE: 66 BPM | BODY MASS INDEX: 31.98 KG/M2 | OXYGEN SATURATION: 98 % | DIASTOLIC BLOOD PRESSURE: 98 MMHG

## 2020-03-12 DIAGNOSIS — Z00.00 ANNUAL PHYSICAL EXAM: Primary | ICD-10-CM

## 2020-03-12 DIAGNOSIS — R03.0 ELEVATED BLOOD PRESSURE READING: ICD-10-CM

## 2020-03-12 DIAGNOSIS — Z00.00 ANNUAL PHYSICAL EXAM: ICD-10-CM

## 2020-03-12 LAB
ALBUMIN SERPL BCP-MCNC: 3.7 G/DL (ref 3.5–5.2)
ALP SERPL-CCNC: 73 U/L (ref 55–135)
ALT SERPL W/O P-5'-P-CCNC: 10 U/L (ref 10–44)
ANION GAP SERPL CALC-SCNC: 6 MMOL/L (ref 8–16)
AST SERPL-CCNC: 9 U/L (ref 10–40)
BASOPHILS # BLD AUTO: 0.03 K/UL (ref 0–0.2)
BASOPHILS NFR BLD: 0.7 % (ref 0–1.9)
BILIRUB SERPL-MCNC: 0.4 MG/DL (ref 0.1–1)
BUN SERPL-MCNC: 7 MG/DL (ref 6–20)
CALCIUM SERPL-MCNC: 8.9 MG/DL (ref 8.7–10.5)
CHLORIDE SERPL-SCNC: 107 MMOL/L (ref 95–110)
CHOLEST SERPL-MCNC: 161 MG/DL (ref 120–199)
CHOLEST/HDLC SERPL: 3 {RATIO} (ref 2–5)
CO2 SERPL-SCNC: 28 MMOL/L (ref 23–29)
CREAT SERPL-MCNC: 0.7 MG/DL (ref 0.5–1.4)
DIFFERENTIAL METHOD: ABNORMAL
EOSINOPHIL # BLD AUTO: 0.1 K/UL (ref 0–0.5)
EOSINOPHIL NFR BLD: 2.4 % (ref 0–8)
ERYTHROCYTE [DISTWIDTH] IN BLOOD BY AUTOMATED COUNT: 13.8 % (ref 11.5–14.5)
EST. GFR  (AFRICAN AMERICAN): >60 ML/MIN/1.73 M^2
EST. GFR  (NON AFRICAN AMERICAN): >60 ML/MIN/1.73 M^2
GLUCOSE SERPL-MCNC: 82 MG/DL (ref 70–110)
HCT VFR BLD AUTO: 35.9 % (ref 37–48.5)
HDLC SERPL-MCNC: 53 MG/DL (ref 40–75)
HDLC SERPL: 32.9 % (ref 20–50)
HGB BLD-MCNC: 11.4 G/DL (ref 12–16)
IMM GRANULOCYTES # BLD AUTO: 0.01 K/UL (ref 0–0.04)
IMM GRANULOCYTES NFR BLD AUTO: 0.2 % (ref 0–0.5)
LDLC SERPL CALC-MCNC: 97.6 MG/DL (ref 63–159)
LYMPHOCYTES # BLD AUTO: 1.7 K/UL (ref 1–4.8)
LYMPHOCYTES NFR BLD: 40 % (ref 18–48)
MCH RBC QN AUTO: 26.5 PG (ref 27–31)
MCHC RBC AUTO-ENTMCNC: 31.8 G/DL (ref 32–36)
MCV RBC AUTO: 84 FL (ref 82–98)
MONOCYTES # BLD AUTO: 0.3 K/UL (ref 0.3–1)
MONOCYTES NFR BLD: 8.2 % (ref 4–15)
NEUTROPHILS # BLD AUTO: 2 K/UL (ref 1.8–7.7)
NEUTROPHILS NFR BLD: 48.5 % (ref 38–73)
NONHDLC SERPL-MCNC: 108 MG/DL
NRBC BLD-RTO: 0 /100 WBC
PLATELET # BLD AUTO: 314 K/UL (ref 150–350)
PMV BLD AUTO: 9.5 FL (ref 9.2–12.9)
POTASSIUM SERPL-SCNC: 3.9 MMOL/L (ref 3.5–5.1)
PROT SERPL-MCNC: 7.5 G/DL (ref 6–8.4)
RBC # BLD AUTO: 4.3 M/UL (ref 4–5.4)
SODIUM SERPL-SCNC: 141 MMOL/L (ref 136–145)
TRIGL SERPL-MCNC: 52 MG/DL (ref 30–150)
TSH SERPL DL<=0.005 MIU/L-ACNC: 3.4 UIU/ML (ref 0.4–4)
WBC # BLD AUTO: 4.15 K/UL (ref 3.9–12.7)

## 2020-03-12 PROCEDURE — 80061 LIPID PANEL: CPT

## 2020-03-12 PROCEDURE — 99385 PR PREVENTIVE VISIT,NEW,18-39: ICD-10-PCS | Mod: S$GLB,,, | Performed by: INTERNAL MEDICINE

## 2020-03-12 PROCEDURE — 84443 ASSAY THYROID STIM HORMONE: CPT

## 2020-03-12 PROCEDURE — 99385 PREV VISIT NEW AGE 18-39: CPT | Mod: S$GLB,,, | Performed by: INTERNAL MEDICINE

## 2020-03-12 PROCEDURE — 80053 COMPREHEN METABOLIC PANEL: CPT

## 2020-03-12 PROCEDURE — 99999 PR PBB SHADOW E&M-EST. PATIENT-LVL III: ICD-10-PCS | Mod: PBBFAC,,, | Performed by: INTERNAL MEDICINE

## 2020-03-12 PROCEDURE — 83036 HEMOGLOBIN GLYCOSYLATED A1C: CPT

## 2020-03-12 PROCEDURE — 85025 COMPLETE CBC W/AUTO DIFF WBC: CPT

## 2020-03-12 PROCEDURE — 99999 PR PBB SHADOW E&M-EST. PATIENT-LVL III: CPT | Mod: PBBFAC,,, | Performed by: INTERNAL MEDICINE

## 2020-03-12 RX ORDER — ASCORBIC ACID, CHOLECALCIFEROL, DL-.ALPHA.-TOCOPHEROL ACETATE, THIAMINE HYDROCHLORIDE, RIBOFLAVIN, NIACINAMIDE, PYRIDOXINE HYDROCHLORIDE, FOLIC ACID, CYANOCOBALAMIN, CALCIUM CARBONATE, FERROUS FUMARATE, ZINC OXIDE, CUPRIC SULFATE 100; 400; 30; 1.6; 1.6; 20; 3.1; 1; 12; 200; 27; 10; 2 MG/1; [IU]/1; [IU]/1; MG/1; MG/1; MG/1; MG/1; MG/1; UG/1; MG/1; MG/1; MG/1; MG/1
1 TABLET, COATED ORAL DAILY
Status: ON HOLD | COMMUNITY
Start: 2020-01-25 | End: 2020-12-11 | Stop reason: HOSPADM

## 2020-03-12 NOTE — PROGRESS NOTES
SUBJECTIVE     Chief Complaint   Patient presents with    Annual Exam       HPI  Pilar Hernandez is a 35 y.o. female with multiple medical diagnoses as listed in the medical history and problem list that presents for annual exam. Pt has been doing well since her last visit, which was 3 years ago or so. She has a good appetite and eats well. She does exercise by going to the gym for 30-40 minutes on the treadmill along with squats/sit-ups. She sleeps for ~5 hours nightly. Pt does take OTC supplements, which is a MVI and prenatal vitamins. She does not have any current stressors. Pt is UTD on age appropriate CA screening.    PAST MEDICAL HISTORY:  Past Medical History:   Diagnosis Date    Infertility        PAST SURGICAL HISTORY:  Past Surgical History:   Procedure Laterality Date    verticle  sleeve  2013    patient lost 109# Dr. Carlos Bowles        SOCIAL HISTORY:  Social History     Socioeconomic History    Marital status:      Spouse name: Not on file    Number of children: Not on file    Years of education: Not on file    Highest education level: Not on file   Occupational History    Not on file   Social Needs    Financial resource strain: Not hard at all    Food insecurity:     Worry: Never true     Inability: Never true    Transportation needs:     Medical: No     Non-medical: No   Tobacco Use    Smoking status: Never Smoker    Smokeless tobacco: Never Used   Substance and Sexual Activity    Alcohol use: Yes     Frequency: Monthly or less     Drinks per session: 1 or 2     Binge frequency: Never     Comment: prior to pregnancy    Drug use: No    Sexual activity: Yes     Partners: Male     Birth control/protection: None   Lifestyle    Physical activity:     Days per week: 4 days     Minutes per session: 50 min    Stress: Not at all   Relationships    Social connections:     Talks on phone: More than three times a week     Gets together: More than three times a week     Attends  Mandaen service: Not on file     Active member of club or organization: No     Attends meetings of clubs or organizations: Never     Relationship status:    Other Topics Concern    Not on file   Social History Narrative    Not on file       FAMILY HISTORY:  Family History   Problem Relation Age of Onset    Diabetes Mother     Hypertension Mother     Breast cancer Neg Hx     Cancer Neg Hx     Colon cancer Neg Hx     Eclampsia Neg Hx     Miscarriages / Stillbirths Neg Hx     Ovarian cancer Neg Hx      labor Neg Hx     Stroke Neg Hx        ALLERGIES AND MEDICATIONS: updated and reviewed.  Review of patient's allergies indicates:  No Known Allergies  Current Outpatient Medications   Medication Sig Dispense Refill     27 mg iron- 1 mg Tab Take 1 tablet by mouth once daily.      docusate sodium (COLACE) 100 MG capsule Take 1 capsule (100 mg total) by mouth 2 (two) times daily as needed for Constipation. (Patient not taking: Reported on 2019) 60 capsule 1    norelgestromin-ethinyl estradiol (ORTHO EVRA) 150-35 mcg/24 hr Place 1 patch onto the skin once a week. For 3 weeks each month and off one week. (Patient not taking: Reported on 3/12/2020) 3 patch 11     No current facility-administered medications for this visit.        ROS  Review of Systems   Constitutional: Negative for activity change and unexpected weight change.   HENT: Negative for hearing loss, rhinorrhea and trouble swallowing.    Eyes: Negative for discharge and visual disturbance.   Respiratory: Negative for chest tightness and wheezing.    Cardiovascular: Negative for chest pain and palpitations.   Gastrointestinal: Negative for blood in stool, constipation, diarrhea and vomiting.   Endocrine: Negative for polydipsia and polyuria.   Genitourinary: Negative for difficulty urinating, dysuria, hematuria and menstrual problem.   Musculoskeletal: Negative for arthralgias, joint swelling and neck pain.   Skin: Negative  "for rash and wound.   Neurological: Negative for weakness and headaches.   Psychiatric/Behavioral: Negative for confusion and dysphoric mood.         OBJECTIVE     Physical Exam  Vitals:    03/12/20 1050   BP: (!) 128/98   Pulse:    Temp:     Body mass index is 32.08 kg/m².  Weight: 95.7 kg (210 lb 15.7 oz)   Height: 5' 8" (172.7 cm)     Physical Exam   Constitutional: She is oriented to person, place, and time. She appears well-developed and well-nourished. No distress.   HENT:   Head: Normocephalic and atraumatic.   Right Ear: Hearing, tympanic membrane and external ear normal.   Left Ear: Hearing, tympanic membrane and external ear normal.   Nose: Nose normal. No rhinorrhea.   Mouth/Throat: Oropharynx is clear and moist. No uvula swelling. No posterior oropharyngeal edema or posterior oropharyngeal erythema.   Eyes: Conjunctivae and EOM are normal. Right eye exhibits no discharge. Left eye exhibits no discharge. No scleral icterus.   Neck: Normal range of motion. Neck supple. No JVD present. No tracheal deviation present.   Cardiovascular: Normal rate, regular rhythm and intact distal pulses. Exam reveals no gallop and no friction rub.   No murmur heard.  Pulmonary/Chest: Effort normal and breath sounds normal. No respiratory distress. She has no wheezes.   Abdominal: Soft. Bowel sounds are normal. She exhibits no distension and no mass. There is no tenderness. There is no rebound and no guarding.   Musculoskeletal: Normal range of motion. She exhibits no edema, tenderness or deformity.   Neurological: She is alert and oriented to person, place, and time. She exhibits normal muscle tone. Coordination normal.   Skin: Skin is warm and dry. No rash noted. No erythema.   Psychiatric: She has a normal mood and affect. Her behavior is normal. Judgment and thought content normal.         Health Maintenance       Date Due Completion Date    Lipid Panel 1985 ---    Influenza Vaccine (1) 09/01/2019 4/6/2019    Pap " Smear 09/30/2020 9/30/2019    TETANUS VACCINE 04/23/2028 4/23/2018            ASSESSMENT     35 y.o. female with     1. Annual physical exam    2. Elevated blood pressure reading        PLAN:     1. Annual physical exam  - Counseled on age appropriate medical preventative services, including age appropriate cancer screenings, over all nutritional health, need for a consistent exercise regimen and an over all push towards maintaining a vigorous and active lifestyle.  Counseled on age appropriate vaccines and discussed upcoming health care needs based on age/gender.  Spent time with patient counseling on need for a good patient/doctor relationship moving forward.  Discussed use of common OTC medications and supplements.  Discussed common dietary aids and use of caffeine and the need for good sleep hygiene and stress management.  - CBC auto differential; Future  - Comprehensive metabolic panel; Future  - Hemoglobin A1c; Future  - TSH; Future  - Lipid panel; Future    2. Elevated blood pressure reading  - BP well controlled; at goal of <140/90  -  Advised to maintain a low Na diet(<2g/day), exercise, and keep BP log to present to next visit  - Monitor        RTC in 4 weeks for nurse visit BP check     Dana Germain MD  03/12/2020 10:29 AM        No follow-ups on file.

## 2020-03-13 LAB
ESTIMATED AVG GLUCOSE: 108 MG/DL (ref 68–131)
HBA1C MFR BLD HPLC: 5.4 % (ref 4–5.6)

## 2020-03-15 DIAGNOSIS — D64.9 NORMOCYTIC ANEMIA: Primary | ICD-10-CM

## 2020-04-12 ENCOUNTER — PATIENT MESSAGE (OUTPATIENT)
Dept: OBSTETRICS AND GYNECOLOGY | Facility: CLINIC | Age: 35
End: 2020-04-12

## 2020-04-14 ENCOUNTER — TELEPHONE (OUTPATIENT)
Dept: OBSTETRICS AND GYNECOLOGY | Facility: CLINIC | Age: 35
End: 2020-04-14

## 2020-04-14 NOTE — TELEPHONE ENCOUNTER
----- Message from Kathryn Cardenas sent at 4/14/2020  9:51 AM CDT -----  Contact: Pilar [phone]  Type: Patient Call Back    Who called:Pilar     What is the request in detail: Patient is returning a call back to Ms. Bisi in regards to getting a sooner appt for a new pregnancy    Can the clinic reply by [facility]?no    Would the patient rather a call back or a response via [facility]? Call back     Best call back number: [phone]        Pt is aware that she has appt sched for 4/20/2020 for preg testing

## 2020-04-14 NOTE — TELEPHONE ENCOUNTER
----- Message from Elana Owen sent at 4/14/2020  9:40 AM CDT -----  Contact: pt  New OB    Can the clinic reply in [facility]:     Who Called: pt    Date of Positive Preg Test: 4/12  1st day of Last Menstrual Cycle: 3/13    List Any Difficulties:     Preferred Provider:    What Number to Call Back: [phone] (home)       Attempted to contact pt, no answer  LM for pt to contact the offfice to sched an appt to confirm her current pregnancy

## 2020-04-20 ENCOUNTER — OFFICE VISIT (OUTPATIENT)
Dept: OBSTETRICS AND GYNECOLOGY | Facility: CLINIC | Age: 35
End: 2020-04-20
Payer: COMMERCIAL

## 2020-04-20 VITALS
BODY MASS INDEX: 32.28 KG/M2 | SYSTOLIC BLOOD PRESSURE: 131 MMHG | DIASTOLIC BLOOD PRESSURE: 81 MMHG | HEIGHT: 68 IN | WEIGHT: 213 LBS

## 2020-04-20 DIAGNOSIS — Z30.09 FAMILY PLANNING: ICD-10-CM

## 2020-04-20 DIAGNOSIS — Z32.00 ENCOUNTER FOR CONFIRMATION OF PREGNANCY TEST RESULT WITH PHYSICAL EXAMINATION: Primary | ICD-10-CM

## 2020-04-20 DIAGNOSIS — Z32.01 POSITIVE PREGNANCY TEST: ICD-10-CM

## 2020-04-20 LAB
B-HCG UR QL: POSITIVE
CTP QC/QA: YES

## 2020-04-20 PROCEDURE — 3008F BODY MASS INDEX DOCD: CPT | Mod: CPTII,S$GLB,, | Performed by: OBSTETRICS & GYNECOLOGY

## 2020-04-20 PROCEDURE — 99999 PR PBB SHADOW E&M-EST. PATIENT-LVL III: ICD-10-PCS | Mod: PBBFAC,,, | Performed by: OBSTETRICS & GYNECOLOGY

## 2020-04-20 PROCEDURE — 99213 OFFICE O/P EST LOW 20 MIN: CPT | Mod: S$GLB,,, | Performed by: OBSTETRICS & GYNECOLOGY

## 2020-04-20 PROCEDURE — 99213 PR OFFICE/OUTPT VISIT, EST, LEVL III, 20-29 MIN: ICD-10-PCS | Mod: S$GLB,,, | Performed by: OBSTETRICS & GYNECOLOGY

## 2020-04-20 PROCEDURE — 99999 PR PBB SHADOW E&M-EST. PATIENT-LVL III: CPT | Mod: PBBFAC,,, | Performed by: OBSTETRICS & GYNECOLOGY

## 2020-04-20 PROCEDURE — 81025 URINE PREGNANCY TEST: CPT | Mod: S$GLB,,, | Performed by: OBSTETRICS & GYNECOLOGY

## 2020-04-20 PROCEDURE — 81025 POCT URINE PREGNANCY: ICD-10-PCS | Mod: S$GLB,,, | Performed by: OBSTETRICS & GYNECOLOGY

## 2020-04-20 PROCEDURE — 3008F PR BODY MASS INDEX (BMI) DOCUMENTED: ICD-10-PCS | Mod: CPTII,S$GLB,, | Performed by: OBSTETRICS & GYNECOLOGY

## 2020-04-20 RX ORDER — NORELGESTROMIN AND ETHINYL ESTRADIOL 150; 35 UG/D; UG/D
PATCH TRANSDERMAL
Qty: 3 PATCH | Refills: 6 | Status: SHIPPED | OUTPATIENT
Start: 2020-04-20 | End: 2020-05-05

## 2020-04-21 NOTE — PROGRESS NOTES
[facility]  Ambulatory Clinic  Obstetrics & Gynecology    Visit Date:  2020     Chief Complaint:  Missed my period    History of Present Illness:      Ange Hernandez is 35 y.o. , UPT positive today, here with c/o missed period.  Patient's last menstrual period was 2020.    Pt has no major complaints today and denies any vaginal bleeding, discharge, pain, GI/ compliants.    Pt reports overall good health.    Past Medical History:      None      Past Surgical History:     Vertical sleeve -     Medications:     Prenatal vitamins     Allergies:     NKDA      Obstetric History:       Para Term  AB Living   3 2 2 0 0 1   SAB TAB Ectopic Multiple Live Births   0 0 0 0 1      # Outcome Date GA Lbr Urban/2nd Weight Sex Delivery Anes PTL Lv   3 Current            2 Term 19 38w6d  3.33 kg (7 lb 5.5 oz) M Vag-Spont EPI N       Name: BERNARDO BOY ANGE      Apgar1: 9  Apgar5: 9   1 Term 18 39w4d / 03:38 3.24 kg (7 lb 2.3 oz) F Vag-Spont EPI N NICOLE      Name: BERNARDO GIRL ANGE      Apgar1: 8  Apgar5: 9     Gynecologic History:      Denies recent/active STI  Denies history abnormal Pap     Social History:      Denies tobacco, alcohol or illicit drug use  Current partner is father of baby  Denies domestic abuse     Family History:     Denies congenital anomalies, inherited syndromes, fetal aneuploidy    Review of Systems:    GENERAL:  No fever, fatigue, excessive weight gain or loss  HEENT:  No head injury, headaches, hearing changes, visual disturbance  RESPIRATORY:  No cough, shortness of breath  CARDIOVASCULAR:  No chest pain, heart palpitations, leg swelling  BREAST:  No lump, pain, nipple discharge, skin changes  GASTROINTESTINAL:  No nausea, vomiting, constipation, diarrhea, abd pain, rectal bleeding   URINARY:  No dysuria, frequency, hematuria  GENITOURINARY:  See HPI  ENDOCRINE:  No heat or cold intolerance  HEMATOLOGIC:  No easy  "bruisability or bleeding   LYMPHATICS:  No enlarged nodes  MUSCULOSKELETAL:  No acute joint pain or swelling  SKIN:  No rash, lesions, jaundice  NEUROLOGIC:  No dizziness, weakness, syncope  PSYCHIATRIC:  No significant mood changes, suicidal or homicidal ideations    Physical Exam:     /81   Ht 5' 8" (1.727 m)   Wt 96.6 kg (213 lb)   LMP 2020   BMI 32.39 kg/m²   Pulse 60, Resp rate 16    GENERAL:  NAD. Well-nourished. A&Ox3.  HEENT:  NCAT, EOMI, moist mucus membranes, neck supple w/o masses.  BREAST:  Symmetric, non-tender, no obvious masses, no skin changes, no nipple discharge.  LUNGS:  CTA-B.  HEART:  RRR, physiologic heart sounds.  ABDOMEN:  Soft, non-tender. Normoactive BS.  No obvious organomegaly.   EXT:  Symmetric w/o cramping, claudication, or edema. +2 distal pulses. FROM.  SKIN:  No rashes  NEURO:  Grossly intact bilaterally. +2 DTR.  PSYCH:  Mood & affect appropriate.     GENITOURINARY:  NFEG no lesion. No vaginal or cervical lesion. No bleeding or discharge. Good support. No CMT. Uterus and ovaries small, NT. Cervix Cervix closed, thick, high, posterior. Wet prep negative. Declined rectal exam. No obvious external lesions.    Chaperone present for exam.    Assessment:     1. 35 y.o. , UPT positive, LMP 3/13/20, here for confirmation of pregnancy  2. Advanced maternal age    Plan:    Principles of prenatal care, weight gain goals, dietary/lifestyle modifications, pregnancy care instructions and precautions review.    Prenatal educational material reviewed.    Continue prenatal vitamins.    SAB and ectopic precautions reviewed.      Discussed maternal/fetal implications of AMA on pregnancy.  Discussed first trimester testing.  Pt is considering cfDNA testing, declined invasive testing/amnio/[facility] testing.  Refer to MFM as pregnancy progresses.    Return in 4 weeks to initiate prenatal care.      All questions answered, pt voiced understanding.      Julio Cesar Elias MD      "

## 2020-05-05 ENCOUNTER — INITIAL PRENATAL (OUTPATIENT)
Dept: OBSTETRICS AND GYNECOLOGY | Facility: CLINIC | Age: 35
End: 2020-05-05
Payer: COMMERCIAL

## 2020-05-05 ENCOUNTER — PATIENT MESSAGE (OUTPATIENT)
Dept: ADMINISTRATIVE | Facility: OTHER | Age: 35
End: 2020-05-05

## 2020-05-05 VITALS
DIASTOLIC BLOOD PRESSURE: 76 MMHG | BODY MASS INDEX: 32.38 KG/M2 | SYSTOLIC BLOOD PRESSURE: 126 MMHG | HEART RATE: 70 BPM | WEIGHT: 212.94 LBS

## 2020-05-05 DIAGNOSIS — Z3A.01 7 WEEKS GESTATION OF PREGNANCY: Primary | ICD-10-CM

## 2020-05-05 DIAGNOSIS — O09.529 ANTEPARTUM MULTIGRAVIDA OF ADVANCED MATERNAL AGE: ICD-10-CM

## 2020-05-05 PROCEDURE — 0500F PR INITIAL PRENATAL CARE VISIT: ICD-10-PCS | Mod: S$GLB,,, | Performed by: OBSTETRICS & GYNECOLOGY

## 2020-05-05 PROCEDURE — 99999 PR PBB SHADOW E&M-EST. PATIENT-LVL III: CPT | Mod: PBBFAC,,, | Performed by: OBSTETRICS & GYNECOLOGY

## 2020-05-05 PROCEDURE — 87086 URINE CULTURE/COLONY COUNT: CPT

## 2020-05-05 PROCEDURE — 87491 CHLMYD TRACH DNA AMP PROBE: CPT

## 2020-05-05 PROCEDURE — 0500F INITIAL PRENATAL CARE VISIT: CPT | Mod: S$GLB,,, | Performed by: OBSTETRICS & GYNECOLOGY

## 2020-05-05 PROCEDURE — 99999 PR PBB SHADOW E&M-EST. PATIENT-LVL III: ICD-10-PCS | Mod: PBBFAC,,, | Performed by: OBSTETRICS & GYNECOLOGY

## 2020-05-05 NOTE — PROGRESS NOTES
[facility]  Ambulatory Clinic  Obstetrics & Gynecology    Visit Date:  2020     Chief Complaint:  Missed my period    History of Present Illness:      Ange Hernandez is 35 y.o. , UPT positive today, here with c/o missed period.  Patient's last menstrual period was 2020.    Pt has no major complaints today and denies any vaginal bleeding, discharge, pain, GI/ compliants.    Pt reports overall good health.    Past Medical History:      None      Past Surgical History:     Vertical sleeve -     Medications:     Prenatal vitamins     Allergies:     NKDA      Obstetric History:       Para Term  AB Living   3 2 2 0 0 1   SAB TAB Ectopic Multiple Live Births   0 0 0 0 1      # Outcome Date GA Lbr Urban/2nd Weight Sex Delivery Anes PTL Lv   3 Current            2 Term 19 38w6d  3.33 kg (7 lb 5.5 oz) M Vag-Spont EPI N       Name: BERNARDO BOY ANGE      Apgar1: 9  Apgar5: 9   1 Term 18 39w4d / 03:38 3.24 kg (7 lb 2.3 oz) F Vag-Spont EPI N NICOLE      Name: BERNARDO GIRL ANGE      Apgar1: 8  Apgar5: 9     Gynecologic History:      Denies recent/active STI  Denies history abnormal Pap     Social History:      Denies tobacco, alcohol or illicit drug use  Current partner is father of baby  Denies domestic abuse     Family History:     Denies congenital anomalies, inherited syndromes, fetal aneuploidy    Review of Systems:    GENERAL:  No fever, fatigue, excessive weight gain or loss  HEENT:  No head injury, headaches, hearing changes, visual disturbance  RESPIRATORY:  No cough, shortness of breath  CARDIOVASCULAR:  No chest pain, heart palpitations, leg swelling  BREAST:  No lump, pain, nipple discharge, skin changes  GASTROINTESTINAL:  No nausea, vomiting, constipation, diarrhea, abd pain, rectal bleeding   URINARY:  No dysuria, frequency, hematuria  GENITOURINARY:  See HPI  ENDOCRINE:  No heat or cold intolerance  HEMATOLOGIC:  No easy  "bruisability or bleeding   LYMPHATICS:  No enlarged nodes  MUSCULOSKELETAL:  No acute joint pain or swelling  SKIN:  No rash, lesions, jaundice  NEUROLOGIC:  No dizziness, weakness, syncope  PSYCHIATRIC:  No significant mood changes, suicidal or homicidal ideations    Physical Exam:     /81   Ht 5' 8" (1.727 m)   Wt 96.6 kg (213 lb)   LMP 2020   BMI 32.39 kg/m²   Pulse 60, Resp rate 16    GENERAL:  NAD. Well-nourished. A&Ox3.  HEENT:  NCAT, EOMI, moist mucus membranes, neck supple w/o masses.  BREAST:  Symmetric, non-tender, no obvious masses, no skin changes, no nipple discharge.  LUNGS:  CTA-B.  HEART:  RRR, physiologic heart sounds.  ABDOMEN:  Soft, non-tender. Normoactive BS.  No obvious organomegaly.   EXT:  Symmetric w/o cramping, claudication, or edema. +2 distal pulses. FROM.  SKIN:  No rashes  NEURO:  Grossly intact bilaterally. +2 DTR.  PSYCH:  Mood & affect appropriate.     GENITOURINARY:  NFEG no lesion. No vaginal or cervical lesion. No bleeding or discharge. Good support. No CMT. Uterus and ovaries small, NT. Cervix Cervix closed, thick, high, posterior. Wet prep negative. Declined rectal exam. No obvious external lesions.    Chaperone present for exam.    Assessment:     1. 35 y.o. , UPT positive, LMP 3/13/20, here for confirmation of pregnancy  2. Advanced maternal age    Plan:    Principles of prenatal care, weight gain goals, dietary/lifestyle modifications, pregnancy care instructions and precautions review.    Prenatal educational material reviewed.    Continue prenatal vitamins.    SAB and ectopic precautions reviewed.      Discussed maternal/fetal implications of AMA on pregnancy.  Discussed first trimester testing.  Pt is considering cfDNA testing, declined invasive testing/amnio/CVS testing.  Refer to MFM as pregnancy progresses.    Return in 4 weeks to initiate prenatal care.      All questions answered, pt voiced understanding.      Julio Cesar Elias, " MD    _____________________________________________________________________    5/5/2020    7w4d here for initial OB visit.    Pregnancy remarkable for:  Advanced maternal age    Pt has no major complaints today.  Denies vaginal bleeding, leakage of fluid, or contractions.    Discussed implications of AMA on her pregnancy.  Pt is considering cfDNA testing, declined invasive testing/amnio/CVS testing.  Refer to [facility] for dating US.    Pt declined initial OB labs today, order next visit.    Discussed daily low dose ASA ~14 wks for prevention of preE, IUGR, and stillbirths due to maternal risk factors.    Discussed with pt our local health care system response to Covid-19.  Discussed preventive measures, social distancing, and parameters for testing.  Clinic and hospital mitigation policies and preventative strategies discussed.    Discussed Connective MOM program.  Rationale for program including risks, benefits, and alternatives discussed..  Pt interested, orders placed.  Pt advised to complete registration online.    Principles of prenatal care and precautions reviewed.  Return 4 wks or sooner prn.  Voiced understanding.      Julio Cesar Elias MD  _____________________________________________________________________

## 2020-05-06 ENCOUNTER — PATIENT MESSAGE (OUTPATIENT)
Dept: OBSTETRICS AND GYNECOLOGY | Facility: CLINIC | Age: 35
End: 2020-05-06

## 2020-05-07 LAB
BACTERIA UR CULT: NORMAL
C TRACH DNA SPEC QL NAA+PROBE: NOT DETECTED
N GONORRHOEA DNA SPEC QL NAA+PROBE: NOT DETECTED

## 2020-05-20 ENCOUNTER — PROCEDURE VISIT (OUTPATIENT)
Dept: MATERNAL FETAL MEDICINE | Facility: CLINIC | Age: 35
End: 2020-05-20
Payer: COMMERCIAL

## 2020-05-20 ENCOUNTER — INITIAL CONSULT (OUTPATIENT)
Dept: MATERNAL FETAL MEDICINE | Facility: CLINIC | Age: 35
End: 2020-05-20
Payer: COMMERCIAL

## 2020-05-20 VITALS
BODY MASS INDEX: 32.52 KG/M2 | DIASTOLIC BLOOD PRESSURE: 83 MMHG | WEIGHT: 213.88 LBS | SYSTOLIC BLOOD PRESSURE: 133 MMHG

## 2020-05-20 DIAGNOSIS — Z3A.01 7 WEEKS GESTATION OF PREGNANCY: ICD-10-CM

## 2020-05-20 DIAGNOSIS — O09.529 ANTEPARTUM MULTIGRAVIDA OF ADVANCED MATERNAL AGE: ICD-10-CM

## 2020-05-20 PROCEDURE — 99214 PR OFFICE/OUTPT VISIT, EST, LEVL IV, 30-39 MIN: ICD-10-PCS | Mod: 25,S$GLB,, | Performed by: PEDIATRICS

## 2020-05-20 PROCEDURE — 99999 PR PBB SHADOW E&M-EST. PATIENT-LVL I: ICD-10-PCS | Mod: PBBFAC,,, | Performed by: PEDIATRICS

## 2020-05-20 PROCEDURE — 99999 PR PBB SHADOW E&M-EST. PATIENT-LVL I: CPT | Mod: PBBFAC,,, | Performed by: PEDIATRICS

## 2020-05-20 PROCEDURE — 76817 TRANSVAGINAL US OBSTETRIC: CPT | Mod: S$GLB,,, | Performed by: PEDIATRICS

## 2020-05-20 PROCEDURE — 76817 PR US, OB, TRANSVAG APPROACH: ICD-10-PCS | Mod: S$GLB,,, | Performed by: PEDIATRICS

## 2020-05-20 PROCEDURE — 99214 OFFICE O/P EST MOD 30 MIN: CPT | Mod: 25,S$GLB,, | Performed by: PEDIATRICS

## 2020-05-20 NOTE — PROGRESS NOTES
Chief complaint: Advanced maternal age in pregnancy     Provider requesting consultation: Dr. Leo     Age based risk for Down Syndrome at this gestational age is approximately 1 in 192.     Aneuploidy screening this pregnancy has not been performed.     A dating ultrasound was completed today.  Ultrasound findings are in Viewpoint.               Ms. Marcellus Chang is a 36 yo  female here for dating and AMA recommendations.  She has no relevant medical history.         OB History:    1. 18:  Female infant, 39-4/7 weeks', 7# 2.3 oz, no complications,   2. 19:  Male infant, 38-6/7 weeks', 7# 7oz, no complications,     1 Term 18 39w4d / 3h 38m 3.24 kg (7 lb 2.3 oz) F Vag-Spont Epidural N Living 8 9   Name: BERNARDO GIRL ANGE   Location: [facility]   Delivering Clinician: Julio Cesar Elias MD      2 Term 19 38w6d  3.33 kg (7 lb 5.5 oz) M Vag-Spont Epidural N Living 9 9   Name: DARIEN CHANG ANGE   Location: [facility]   Delivering Clinician: Julio Cesar Elias MD               Surgical History:  1. LEEP -   2. D&C         NKDA        Medications:  PNV           Advanced maternal age counseling and recommendations:     Today I counseled the patient on the relationship between maternal age and genetic aneuploidy.  We discussed the risks and benefits of screening tests versus definitive genetic testing (amniocentesis).  We discussed the limitations of ultrasound in the definitive diagnosis of Down syndrome and we discussed amniocentesis as providing definitive diagnosis.  I quoted the patient a 1 in 1200 procedure related risk of fetal loss with genetic amniocentesis.  I also discussed with the patient the option of non-invasive prenatal testing (NIPT) including the sensitivity and specificity and predictive value of the test.  Her questions were answered, and after today's consultation she did not want to pursue amniocentesis.  She will pursue  NIPT.           Discussion:     AMA has been defined as maternal age >35 years. The risk of pregnancy loss associated with AMA increases with maternal age.  Miscarriage rates in 35-44 year-old women are as high as 40% and 60- 65% in women >45 years. The increased risk of pregnancy loss extends beyond the first trimester.     Age and obesity are risk factors for development of Type II DM and GDM.  Screening older obese  for gestational diabetes in the first trimester, as well as later in pregnancy, is reasonable.      Today I counseled the patient on the relationship between maternal age and genetic aneuploidy.  We discussed the risks and benefits of screening tests versus definitive genetic testing (amniocentesis). She was counseled about her age risk of Down syndrome.  We discussed the limitations of ultrasound in the definitive diagnosis of Down syndrome and we discussed amniocentesis as providing definitive diagnosis.  I quoted the patient a 1 in 1200 procedure related risk of fetal loss with genetic amniocentesis. Invasive testing (amniocentesis and CVS) is diagnostic. Non-invasive testing of maternal blood (either hormone levels or cell-free fetal DNA) + or - ultrasound  is a screening test and requires follow-up testing of patients with screen-positive results.  I also discussed with the patient the option of non-invasive prenatal testing (NIPT)  including the sensitivity and specificity of the test.  In AMA women, ccfDNA is becoming the preferred test because it has both higher sensitivity and a lower false-positive rate than maternal serum screening, and it is safer than invasive testing. Down syndrome screening tests have both higher detection rates and higher false positive rates in older women than in younger women because of the higher prevalence of Down syndrome in offspring of older women.       Her questions were answered, and after today's consultation she wished to pursue NIPT.                 Recommendations:      1. Increased risk of hypertensive disease and preeclampsia,  birth, placenta previa, gestational diabetes, and placental abruption require education.     2.  The patient's risk for adverse outcome should be assessed by age and  presence or absence of concomitant risk factors such as hypertension, diabetes, obesity, low socioeconomic status, black race, and previous pregnancy complicated by growth restriction or  birth.     3. Genetic screening (done).     4. Anatomy scan at 20 - 22 weeks'.     5. If between 35 and 39 inclusive, we recommend a follow up fetal ultrasound at 32-34 weeks for interval fetal growth and well being (biophysical profile).     6. If co-morbidities exist, delivery and APT according to those standards (she has none).  Otherwise, we recommend weekly APT for women who are >39 from 32 weeks' to delivery.      7. For women >39 years old at time of delivery, we do recommend delivery by 40 weeks' gestation.  This patient does not require early delivery.              I spent 25 minutes in patient care management and consultation with >50% face to face.  (patient has been seen by MFM within past 2 years)

## 2020-05-20 NOTE — LETTER
May 20, 2020      Julio Cesar Elias MD  [address] [facility]  [address]  [facility] [address]           MATERNAL AND FETAL MEDICINE  [address]  [facility] [place] [address]  Phone: [phone]  Fax: [phone]          Patient: Pilar Hernandez   MR Number: [id number]   YOB: 1985   Date of Visit: 5/20/2020       Dear Dr. Julio Cesar Elias:    Thank you for referring Pilar Hernandez to me for evaluation. Attached you will find relevant portions of my assessment and plan of care.    If you have questions, please do not hesitate to call me. I look forward to following Pilar Hernandez along with you.    Sincerely,    Joi Saini MD    Enclosure  CC:  No Recipients    If you would like to receive this communication electronically, please contact [email or web address] or [phone] to request more information on [facility] access.    For providers and/or their staff who would like to refer a patient to [facility], please contact us through our one-stop-shop provider referral line, [facility], at [phone].    If you feel you have received this communication in error or would no longer like to receive these types of communications, please e-mail [email or web address]

## 2020-05-20 NOTE — PROGRESS NOTES
Chief complaint: Advanced maternal age in pregnancy     Provider requesting consultation: Dr. Leo     Age based risk for Down Syndrome at this gestational age is approximately 1 in 192.     Aneuploidy screening this pregnancy has not been performed.     A dating ultrasound was completed today.  Ultrasound findings are in Viewpoint.                    Ms. Marcellus Hernandez is a 34 yo  female here for dating and AMA recommendations.  She has no relevant medical history other than obesity (see history of gastric sleeve in 2010).  Family history and social history are negative.           OB History:     1. 18:  Female infant, 39-4/7 weeks', 7# 2.3 oz, no complications,   2. 19:  Male infant, 38-6/7 weeks', 7# 5.5 oz, no complications,           Surgical History:    1. Gastric Sleeve   (was 285#)          NKDA          Medications:  1. PNV  2. Colace           Advanced maternal age counseling and recommendations:     Today I counseled the patient on the relationship between maternal age and genetic aneuploidy.  We discussed the risks and benefits of screening tests versus definitive genetic testing (amniocentesis).  We discussed the limitations of ultrasound in the definitive diagnosis of Down syndrome and we discussed amniocentesis as providing definitive diagnosis.  I quoted the patient a 1 in 1200 procedure related risk of fetal loss with genetic amniocentesis.  I also discussed with the patient the option of non-invasive prenatal testing (NIPT) including the sensitivity and specificity and predictive value of the test.  Her questions were answered, and after today's consultation she did not want to pursue amniocentesis.  She will pursue NIPT.           Discussion:     AMA has been defined as maternal age >35 years. The risk of pregnancy loss associated with AMA increases with maternal age.  Miscarriage rates in 35-44 year-old women are as high as 40% and 60- 65% in women >45 years. The  increased risk of pregnancy loss extends beyond the first trimester.  Age and obesity are risk factors for development of Type II DM and GDM.  Screening older obese  for gestational diabetes in the first trimester, as well as later in pregnancy, is reasonable.      Today I counseled the patient on the relationship between maternal age and genetic aneuploidy.  We discussed the risks and benefits of screening tests versus definitive genetic testing (amniocentesis). She was counseled about her age risk of Down syndrome.  We discussed the limitations of ultrasound in the definitive diagnosis of Down syndrome and we discussed amniocentesis as providing definitive diagnosis.  I quoted the patient a 1 in 1200 procedure related risk of fetal loss with genetic amniocentesis. Invasive testing (amniocentesis and CVS) is diagnostic. Non-invasive testing of maternal blood (either hormone levels or cell-free fetal DNA) + or - ultrasound  is a screening test and requires follow-up testing of patients with screen-positive results.  I also discussed with the patient the option of non-invasive prenatal testing (NIPT)  including the sensitivity and specificity of the test.  In AMA women, ccfDNA is becoming the preferred test because it has both higher sensitivity and a lower false-positive rate than maternal serum screening, and it is safer than invasive testing. Down syndrome screening tests have both higher detection rates and higher false positive rates in older women than in younger women because of the higher prevalence of Down syndrome in offspring of older women.       Her questions were answered, and after today's consultation she is considering NIPT.  Informational materials were given and she will discuss with her .              Recommendations:      1. Increased risk of hypertensive disease and preeclampsia,  birth, placenta previa, gestational diabetes, and placental abruption require education.     2.   The patient's risk for adverse outcome should be assessed by age and  presence or absence of concomitant risk factors such as hypertension, diabetes, obesity, low socioeconomic status, black race, and previous pregnancy complicated by growth restriction or  birth.     3. Genetic screening - patient is considering mode of testing.     4. Anatomy scan at 20 - 22 weeks'.     5. If between 35 and 39 inclusive, we recommend a follow up fetal ultrasound at 32-34 weeks for interval fetal growth and well being (biophysical profile).     6. If co-morbidities exist, delivery and APT according to those standards (she has none).  Otherwise, we recommend weekly APT for women who are >39 from 32 weeks' to delivery.      7. For women >39 years old at time of delivery, we do recommend delivery by 40 weeks' gestation.  This patient does not require early delivery.              I spent 25 minutes in patient care management and consultation with >50% face to face.  (patient has been seen by MFM within past 2 years)

## 2020-06-04 ENCOUNTER — LAB VISIT (OUTPATIENT)
Dept: LAB | Facility: HOSPITAL | Age: 35
End: 2020-06-04
Attending: OBSTETRICS & GYNECOLOGY
Payer: MEDICAID

## 2020-06-04 ENCOUNTER — ROUTINE PRENATAL (OUTPATIENT)
Dept: OBSTETRICS AND GYNECOLOGY | Facility: CLINIC | Age: 35
End: 2020-06-04
Payer: MEDICAID

## 2020-06-04 VITALS — DIASTOLIC BLOOD PRESSURE: 74 MMHG | SYSTOLIC BLOOD PRESSURE: 124 MMHG | BODY MASS INDEX: 32.39 KG/M2 | WEIGHT: 213 LBS

## 2020-06-04 DIAGNOSIS — Z3A.01 7 WEEKS GESTATION OF PREGNANCY: ICD-10-CM

## 2020-06-04 DIAGNOSIS — Z3A.12 12 WEEKS GESTATION OF PREGNANCY: Primary | ICD-10-CM

## 2020-06-04 DIAGNOSIS — O09.529 ANTEPARTUM MULTIGRAVIDA OF ADVANCED MATERNAL AGE: ICD-10-CM

## 2020-06-04 LAB
ABO + RH BLD: NORMAL
ALBUMIN SERPL BCP-MCNC: 3.7 G/DL (ref 3.5–5.2)
ALP SERPL-CCNC: 50 U/L (ref 55–135)
ALT SERPL W/O P-5'-P-CCNC: 6 U/L (ref 10–44)
ANION GAP SERPL CALC-SCNC: 5 MMOL/L (ref 8–16)
AST SERPL-CCNC: 11 U/L (ref 10–40)
BASOPHILS # BLD AUTO: 0.03 K/UL (ref 0–0.2)
BASOPHILS NFR BLD: 0.6 % (ref 0–1.9)
BILIRUB SERPL-MCNC: 0.4 MG/DL (ref 0.1–1)
BLD GP AB SCN CELLS X3 SERPL QL: NORMAL
BUN SERPL-MCNC: 7 MG/DL (ref 6–20)
CALCIUM SERPL-MCNC: 8.9 MG/DL (ref 8.7–10.5)
CHLORIDE SERPL-SCNC: 106 MMOL/L (ref 95–110)
CO2 SERPL-SCNC: 26 MMOL/L (ref 23–29)
CREAT SERPL-MCNC: 0.7 MG/DL (ref 0.5–1.4)
DIFFERENTIAL METHOD: ABNORMAL
EOSINOPHIL # BLD AUTO: 0.1 K/UL (ref 0–0.5)
EOSINOPHIL NFR BLD: 1 % (ref 0–8)
ERYTHROCYTE [DISTWIDTH] IN BLOOD BY AUTOMATED COUNT: 14.6 % (ref 11.5–14.5)
EST. GFR  (AFRICAN AMERICAN): >60 ML/MIN/1.73 M^2
EST. GFR  (NON AFRICAN AMERICAN): >60 ML/MIN/1.73 M^2
ESTIMATED AVG GLUCOSE: 103 MG/DL (ref 68–131)
GLUCOSE SERPL-MCNC: 76 MG/DL (ref 70–110)
HBA1C MFR BLD HPLC: 5.2 % (ref 4–5.6)
HCT VFR BLD AUTO: 36.3 % (ref 37–48.5)
HGB BLD-MCNC: 11.6 G/DL (ref 12–16)
IMM GRANULOCYTES # BLD AUTO: 0.01 K/UL (ref 0–0.04)
IMM GRANULOCYTES NFR BLD AUTO: 0.2 % (ref 0–0.5)
LYMPHOCYTES # BLD AUTO: 1.5 K/UL (ref 1–4.8)
LYMPHOCYTES NFR BLD: 30.5 % (ref 18–48)
MCH RBC QN AUTO: 26.1 PG (ref 27–31)
MCHC RBC AUTO-ENTMCNC: 32 G/DL (ref 32–36)
MCV RBC AUTO: 82 FL (ref 82–98)
MONOCYTES # BLD AUTO: 0.3 K/UL (ref 0.3–1)
MONOCYTES NFR BLD: 6.3 % (ref 4–15)
NEUTROPHILS # BLD AUTO: 3 K/UL (ref 1.8–7.7)
NEUTROPHILS NFR BLD: 61.4 % (ref 38–73)
NRBC BLD-RTO: 0 /100 WBC
PLATELET # BLD AUTO: 193 K/UL (ref 150–350)
PMV BLD AUTO: 10 FL (ref 9.2–12.9)
POTASSIUM SERPL-SCNC: 3.9 MMOL/L (ref 3.5–5.1)
PROT SERPL-MCNC: 7.2 G/DL (ref 6–8.4)
RBC # BLD AUTO: 4.44 M/UL (ref 4–5.4)
SODIUM SERPL-SCNC: 137 MMOL/L (ref 136–145)
WBC # BLD AUTO: 4.95 K/UL (ref 3.9–12.7)

## 2020-06-04 PROCEDURE — 87340 HEPATITIS B SURFACE AG IA: CPT

## 2020-06-04 PROCEDURE — 80053 COMPREHEN METABOLIC PANEL: CPT

## 2020-06-04 PROCEDURE — 99999 PR PBB SHADOW E&M-EST. PATIENT-LVL III: ICD-10-PCS | Mod: PBBFAC,,, | Performed by: OBSTETRICS & GYNECOLOGY

## 2020-06-04 PROCEDURE — 36415 COLL VENOUS BLD VENIPUNCTURE: CPT

## 2020-06-04 PROCEDURE — 86592 SYPHILIS TEST NON-TREP QUAL: CPT

## 2020-06-04 PROCEDURE — 99213 PR OFFICE/OUTPT VISIT, EST, LEVL III, 20-29 MIN: ICD-10-PCS | Mod: TH,S$PBB,, | Performed by: OBSTETRICS & GYNECOLOGY

## 2020-06-04 PROCEDURE — 86901 BLOOD TYPING SEROLOGIC RH(D): CPT

## 2020-06-04 PROCEDURE — 99213 OFFICE O/P EST LOW 20 MIN: CPT | Mod: PBBFAC,TH,25 | Performed by: OBSTETRICS & GYNECOLOGY

## 2020-06-04 PROCEDURE — 83036 HEMOGLOBIN GLYCOSYLATED A1C: CPT

## 2020-06-04 PROCEDURE — 85025 COMPLETE CBC W/AUTO DIFF WBC: CPT

## 2020-06-04 PROCEDURE — 99999 PR PBB SHADOW E&M-EST. PATIENT-LVL III: CPT | Mod: PBBFAC,,, | Performed by: OBSTETRICS & GYNECOLOGY

## 2020-06-04 PROCEDURE — 86803 HEPATITIS C AB TEST: CPT

## 2020-06-04 PROCEDURE — 86762 RUBELLA ANTIBODY: CPT

## 2020-06-04 PROCEDURE — 86703 HIV-1/HIV-2 1 RESULT ANTBDY: CPT

## 2020-06-04 PROCEDURE — 99213 OFFICE O/P EST LOW 20 MIN: CPT | Mod: TH,S$PBB,, | Performed by: OBSTETRICS & GYNECOLOGY

## 2020-06-04 NOTE — PROGRESS NOTES
12w3d here for OB visit.    Pregnancy complicated by:  AMA    Pt has no major complaints today.  Denies vaginal bleeding, leakage of fluid, or contractions.  Pt completed initial OB labs today.    Discussed implications of AMA on her pregnancy.  Pt declined first trimester aneuploidy screening at this time, and state she would not terminate the pregnancy for any reasons.  Refer to [facility] for anatomy US.    Principles of prenatal care and precautions reviewed.  Return 4 wks or sooner prn.  Voiced understanding.      Julio Cesar Elias MD

## 2020-06-05 LAB
HBV SURFACE AG SERPL QL IA: NEGATIVE
HCV AB SERPL QL IA: NEGATIVE
HIV 1+2 AB+HIV1 P24 AG SERPL QL IA: NEGATIVE
RPR SER QL: NORMAL
RUBV IGG SER-ACNC: 44.1 IU/ML
RUBV IGG SER-IMP: REACTIVE

## 2020-06-08 ENCOUNTER — PROCEDURE VISIT (OUTPATIENT)
Dept: MATERNAL FETAL MEDICINE | Facility: CLINIC | Age: 35
End: 2020-06-08
Payer: MEDICAID

## 2020-06-08 VITALS
SYSTOLIC BLOOD PRESSURE: 123 MMHG | BODY MASS INDEX: 32.18 KG/M2 | DIASTOLIC BLOOD PRESSURE: 78 MMHG | WEIGHT: 211.63 LBS

## 2020-06-08 DIAGNOSIS — Z36.82 ENCOUNTER FOR NUCHAL TRANSLUCENCY TESTING: ICD-10-CM

## 2020-06-08 DIAGNOSIS — O26.92 PREGNANCY, COMPLICATIONS OF, SECOND TRIMESTER: ICD-10-CM

## 2020-06-08 PROCEDURE — 76813 OB US NUCHAL MEAS 1 GEST: CPT | Mod: 26,S$PBB,, | Performed by: OBSTETRICS & GYNECOLOGY

## 2020-06-08 PROCEDURE — 76813 PR US, OB NUCHAL, TRANSABDOM/TRANSVAG, FIRST GESTATION: ICD-10-PCS | Mod: 26,S$PBB,, | Performed by: OBSTETRICS & GYNECOLOGY

## 2020-06-08 PROCEDURE — 76813 OB US NUCHAL MEAS 1 GEST: CPT | Mod: PBBFAC,PO | Performed by: OBSTETRICS & GYNECOLOGY

## 2020-06-08 NOTE — PROGRESS NOTES
Indication  ========    First trimester screening    History  ======    Risk Factors  History risk factors: AMA    Maternal Assessment  =================    BP syst 123 mmHg  BP diast 78 mmHg    Method  ======    Transabdominal ultrasound examination, 2D Color Doppler, Voluson S8. View: Good view    Pregnancy  =========    C[place]ona pregnancy. Number of fetuses: 1    Dating  ======    LMP on: 3/13/2020  GA by LMP 12 w + 3 d  AMARILYS by LMP: 12/18/2020  Ultrasound examination on: 6/8/2020  GA by U/S based upon: CRL  GA by U/S 13 w + 3 d  AMARILYS by U/S: 12/11/2020  Assigned: based on ultrasound (CRL), selected on 05/20/2020  Assigned GA 13 w + 0 d  Assigned AMARILYS: 12/14/2020  Pregnancy length 280 d    General Evaluation  ==============    Cardiac activity present.    Fetal Biometry  ============    Standard   bpm    CRL 73.6 mm  13w 3d        Hadlock    NT 1.90 mm      Fetal Anatomy  ===========    The following structures appear normal:  Cranium.    The following structures were visualized:  Stomach. Bladder. Arms. Legs.    Maternal Structures  ===============    uterine synechiae noted    Consultation  ==========    Type: Ultrasound  There was some confusion if the patient desired to have aneuploidy screening. Areas of the chart list that she did not desire this but she was  scheduled for a nuchal translucency. I discussed with the patient the options of sequential screening and also discussed with her noninvasive  prenatal testing for assessment of fetal aneuploidy. She declines diagnostic testing for aneuploidy. We discussed the risks, benefits,  alternatives, limitations, sensitivities and test performance of sequential screening versus NIPT. The patient elects to have NIPT performed. She  was sent for this blood testing today. Primary obstetrician should offer the patient MSAFP only in the 2nd trimester. Fetal anatomic survey was  scheduled    15 min was spent face-to-face time with greater than half that time  spent in counseling and coordination of care. Please refer to prior  consultation note for details recommendations. Incidental note was made of a uterine synechiae today.    Impression  =========    Carmona live intrauterine pregnancy.  Biometry agrees with the clinical dating.  NT measures 1.9 mm.  Limited early fetal anatomy appears normal.  Uterine synechiae noted.    Recommendation  ==============    NIPT ordered today.  Primary ob should offer patient MSAFP only in the second trimester.  Fetal anatomic survey scheduled.  See prior consult for detailed recommendations.

## 2020-06-12 ENCOUNTER — TELEPHONE (OUTPATIENT)
Dept: MATERNAL FETAL MEDICINE | Facility: CLINIC | Age: 35
End: 2020-06-12

## 2020-06-12 NOTE — TELEPHONE ENCOUNTER
RN called patient to notify of Negative results of [id number]. Patient verbalized understanding with no further questions.

## 2020-07-06 ENCOUNTER — ROUTINE PRENATAL (OUTPATIENT)
Dept: OBSTETRICS AND GYNECOLOGY | Facility: CLINIC | Age: 35
End: 2020-07-06
Payer: MEDICAID

## 2020-07-06 ENCOUNTER — LAB VISIT (OUTPATIENT)
Dept: LAB | Facility: HOSPITAL | Age: 35
End: 2020-07-06
Attending: OBSTETRICS & GYNECOLOGY
Payer: MEDICAID

## 2020-07-06 VITALS — BODY MASS INDEX: 32.6 KG/M2 | WEIGHT: 214.38 LBS | SYSTOLIC BLOOD PRESSURE: 124 MMHG | DIASTOLIC BLOOD PRESSURE: 78 MMHG

## 2020-07-06 DIAGNOSIS — Z3A.17 17 WEEKS GESTATION OF PREGNANCY: ICD-10-CM

## 2020-07-06 DIAGNOSIS — Z3A.17 17 WEEKS GESTATION OF PREGNANCY: Primary | ICD-10-CM

## 2020-07-06 PROCEDURE — 99212 PR OFFICE/OUTPT VISIT, EST, LEVL II, 10-19 MIN: ICD-10-PCS | Mod: TH,S$PBB,, | Performed by: OBSTETRICS & GYNECOLOGY

## 2020-07-06 PROCEDURE — 99213 OFFICE O/P EST LOW 20 MIN: CPT | Mod: PBBFAC | Performed by: OBSTETRICS & GYNECOLOGY

## 2020-07-06 PROCEDURE — 36415 COLL VENOUS BLD VENIPUNCTURE: CPT

## 2020-07-06 PROCEDURE — 99212 OFFICE O/P EST SF 10 MIN: CPT | Mod: TH,S$PBB,, | Performed by: OBSTETRICS & GYNECOLOGY

## 2020-07-06 PROCEDURE — 82105 ALPHA-FETOPROTEIN SERUM: CPT

## 2020-07-06 PROCEDURE — 99999 PR PBB SHADOW E&M-EST. PATIENT-LVL III: ICD-10-PCS | Mod: PBBFAC,,, | Performed by: OBSTETRICS & GYNECOLOGY

## 2020-07-06 PROCEDURE — 99999 PR PBB SHADOW E&M-EST. PATIENT-LVL III: CPT | Mod: PBBFAC,,, | Performed by: OBSTETRICS & GYNECOLOGY

## 2020-07-07 NOTE — PROGRESS NOTES
17w0d here for OB visit.    Pregnancy complicated by:  AMA    No major complaints today.  Reports active fetus.  Denies vaginal bleeding, leakage of fluid, or contractions.    Initial OB lab results d/w pt.  T21 normal.  AFP ordered today.  Refer to [facility] for anatomy US.    Discussed daily low dose ASA for prevention of preE, IUGR, and stillbirths due to maternal risk factors.  Encourage participation in Connective MOM.  COVID precautions.    Precautions reviewed.  Return 4 wks or sooner prn.  Voiced understanding.      Julio Cesar Elias MD

## 2020-07-08 LAB
# FETUSES US: NORMAL
AFP INTERPRETATION: NORMAL
AFP MOM SERPL: 0.96
AFP SERPL-MCNC: 30.3 NG/ML
AFP SERPL-MCNC: NEGATIVE NG/ML
AGE AT DELIVERY: 35
GA (DAYS): 0 D
GA (WEEKS): 17 WK
GESTATIONAL AGE METHOD: NORMAL
IDDM PATIENT QL: NORMAL
SMOKING STATUS FTND: NO

## 2020-07-27 ENCOUNTER — PROCEDURE VISIT (OUTPATIENT)
Dept: MATERNAL FETAL MEDICINE | Facility: CLINIC | Age: 35
End: 2020-07-27
Payer: MEDICAID

## 2020-07-27 VITALS — SYSTOLIC BLOOD PRESSURE: 124 MMHG | BODY MASS INDEX: 32.6 KG/M2 | DIASTOLIC BLOOD PRESSURE: 67 MMHG | WEIGHT: 214.38 LBS

## 2020-07-27 DIAGNOSIS — O26.92 PREGNANCY, COMPLICATIONS OF, SECOND TRIMESTER: ICD-10-CM

## 2020-07-27 PROCEDURE — 76811 OB US DETAILED SNGL FETUS: CPT | Mod: 26,S$PBB,, | Performed by: PEDIATRICS

## 2020-07-27 PROCEDURE — 76811 OB US DETAILED SNGL FETUS: CPT | Mod: PBBFAC,PO | Performed by: PEDIATRICS

## 2020-07-27 PROCEDURE — 76811 PR US, OB FETAL EVAL & EXAM, TRANSABDOM,FIRST GESTATION: ICD-10-PCS | Mod: 26,S$PBB,, | Performed by: PEDIATRICS

## 2020-08-03 ENCOUNTER — ROUTINE PRENATAL (OUTPATIENT)
Dept: OBSTETRICS AND GYNECOLOGY | Facility: CLINIC | Age: 35
End: 2020-08-03
Payer: MEDICAID

## 2020-08-03 VITALS — WEIGHT: 215.06 LBS | BODY MASS INDEX: 32.7 KG/M2 | DIASTOLIC BLOOD PRESSURE: 78 MMHG | SYSTOLIC BLOOD PRESSURE: 120 MMHG

## 2020-08-03 DIAGNOSIS — O09.529 ANTEPARTUM MULTIGRAVIDA OF ADVANCED MATERNAL AGE: ICD-10-CM

## 2020-08-03 DIAGNOSIS — Z3A.21 21 WEEKS GESTATION OF PREGNANCY: Primary | ICD-10-CM

## 2020-08-03 PROCEDURE — 99213 OFFICE O/P EST LOW 20 MIN: CPT | Mod: PBBFAC,TH | Performed by: OBSTETRICS & GYNECOLOGY

## 2020-08-03 PROCEDURE — 99213 PR OFFICE/OUTPT VISIT, EST, LEVL III, 20-29 MIN: ICD-10-PCS | Mod: TH,S$PBB,, | Performed by: OBSTETRICS & GYNECOLOGY

## 2020-08-03 PROCEDURE — 99213 OFFICE O/P EST LOW 20 MIN: CPT | Mod: TH,S$PBB,, | Performed by: OBSTETRICS & GYNECOLOGY

## 2020-08-03 PROCEDURE — 99999 PR PBB SHADOW E&M-EST. PATIENT-LVL III: CPT | Mod: PBBFAC,,, | Performed by: OBSTETRICS & GYNECOLOGY

## 2020-08-03 PROCEDURE — 99999 PR PBB SHADOW E&M-EST. PATIENT-LVL III: ICD-10-PCS | Mod: PBBFAC,,, | Performed by: OBSTETRICS & GYNECOLOGY

## 2020-08-03 NOTE — PROGRESS NOTES
21w0d here for OB visit.    Pregnancy complicated by:  AMA    Pt has no major complaints today.  Reports active fetus.  Denies vaginal bleeding, leakage of fluid, or contractions.    Pt seen [facility] for anatomy 7/27:    Impression   =========   A detailed fetal anatomic ultrasound examination was performed for the following high risk indication: AMA   No fetal structural malformations are identified; however, fetal imaging is incomplete today. A follow-up study will be scheduled to complete the fetal anatomic survey.   Fetal size today is consistent with established gestational age.   Cervical length is normal.   Placental location is normal without evidence of previa.     Recommendation   ==============   Follow up growth and completion of anatomy scheduled   32 week growth will be scheduled at next visit                                                T21 normal.    Discussed daily low dose ASA for prevention of preE, IUGR, and stillbirths due to maternal risk factors.  Encourage participation in Connective MOM.  COVID precautions.    Labor/preE precautions.  Kick counts.  Return 4 wks or sooner prn.  Voiced understanding.      Julio Cesar Elias MD

## 2020-08-31 ENCOUNTER — LAB VISIT (OUTPATIENT)
Dept: LAB | Facility: HOSPITAL | Age: 35
End: 2020-08-31
Attending: OBSTETRICS & GYNECOLOGY
Payer: MEDICAID

## 2020-08-31 ENCOUNTER — ROUTINE PRENATAL (OUTPATIENT)
Dept: OBSTETRICS AND GYNECOLOGY | Facility: CLINIC | Age: 35
End: 2020-08-31
Payer: MEDICAID

## 2020-08-31 ENCOUNTER — PROCEDURE VISIT (OUTPATIENT)
Dept: MATERNAL FETAL MEDICINE | Facility: CLINIC | Age: 35
End: 2020-08-31
Payer: MEDICAID

## 2020-08-31 VITALS
WEIGHT: 215.25 LBS | SYSTOLIC BLOOD PRESSURE: 120 MMHG | DIASTOLIC BLOOD PRESSURE: 78 MMHG | DIASTOLIC BLOOD PRESSURE: 73 MMHG | BODY MASS INDEX: 32.73 KG/M2 | SYSTOLIC BLOOD PRESSURE: 133 MMHG | BODY MASS INDEX: 32.52 KG/M2 | WEIGHT: 213.88 LBS

## 2020-08-31 DIAGNOSIS — Z3A.24 24 WEEKS GESTATION OF PREGNANCY: Primary | ICD-10-CM

## 2020-08-31 DIAGNOSIS — Z3A.24 24 WEEKS GESTATION OF PREGNANCY: ICD-10-CM

## 2020-08-31 DIAGNOSIS — Z36.89 ENCOUNTER FOR ULTRASOUND TO CHECK FETAL GROWTH: ICD-10-CM

## 2020-08-31 LAB
BASOPHILS # BLD AUTO: 0.02 K/UL (ref 0–0.2)
BASOPHILS NFR BLD: 0.3 % (ref 0–1.9)
DIFFERENTIAL METHOD: ABNORMAL
EOSINOPHIL # BLD AUTO: 0.1 K/UL (ref 0–0.5)
EOSINOPHIL NFR BLD: 0.7 % (ref 0–8)
ERYTHROCYTE [DISTWIDTH] IN BLOOD BY AUTOMATED COUNT: 14.1 % (ref 11.5–14.5)
GLUCOSE SERPL-MCNC: 137 MG/DL (ref 70–140)
HCT VFR BLD AUTO: 31.3 % (ref 37–48.5)
HGB BLD-MCNC: 10 G/DL (ref 12–16)
IMM GRANULOCYTES # BLD AUTO: 0.04 K/UL (ref 0–0.04)
IMM GRANULOCYTES NFR BLD AUTO: 0.5 % (ref 0–0.5)
LYMPHOCYTES # BLD AUTO: 1.3 K/UL (ref 1–4.8)
LYMPHOCYTES NFR BLD: 18.1 % (ref 18–48)
MCH RBC QN AUTO: 27.2 PG (ref 27–31)
MCHC RBC AUTO-ENTMCNC: 31.9 G/DL (ref 32–36)
MCV RBC AUTO: 85 FL (ref 82–98)
MONOCYTES # BLD AUTO: 0.4 K/UL (ref 0.3–1)
MONOCYTES NFR BLD: 5 % (ref 4–15)
NEUTROPHILS # BLD AUTO: 5.6 K/UL (ref 1.8–7.7)
NEUTROPHILS NFR BLD: 75.4 % (ref 38–73)
NRBC BLD-RTO: 0 /100 WBC
PLATELET # BLD AUTO: 154 K/UL (ref 150–350)
PMV BLD AUTO: 11 FL (ref 9.2–12.9)
RBC # BLD AUTO: 3.67 M/UL (ref 4–5.4)
WBC # BLD AUTO: 7.36 K/UL (ref 3.9–12.7)

## 2020-08-31 PROCEDURE — 85025 COMPLETE CBC W/AUTO DIFF WBC: CPT

## 2020-08-31 PROCEDURE — 99213 PR OFFICE/OUTPT VISIT, EST, LEVL III, 20-29 MIN: ICD-10-PCS | Mod: TH,S$PBB,, | Performed by: OBSTETRICS & GYNECOLOGY

## 2020-08-31 PROCEDURE — 76816 OB US FOLLOW-UP PER FETUS: CPT | Mod: PBBFAC,PO | Performed by: OBSTETRICS & GYNECOLOGY

## 2020-08-31 PROCEDURE — 99213 OFFICE O/P EST LOW 20 MIN: CPT | Mod: TH,S$PBB,, | Performed by: OBSTETRICS & GYNECOLOGY

## 2020-08-31 PROCEDURE — 82950 GLUCOSE TEST: CPT

## 2020-08-31 PROCEDURE — 99213 OFFICE O/P EST LOW 20 MIN: CPT | Mod: PBBFAC,25 | Performed by: OBSTETRICS & GYNECOLOGY

## 2020-08-31 PROCEDURE — 99999 PR PBB SHADOW E&M-EST. PATIENT-LVL III: CPT | Mod: PBBFAC,,, | Performed by: OBSTETRICS & GYNECOLOGY

## 2020-08-31 PROCEDURE — 76816 PR  US,PREGNANT UTERUS,F/U,TRANSABD APP: ICD-10-PCS | Mod: 26,S$PBB,, | Performed by: OBSTETRICS & GYNECOLOGY

## 2020-08-31 PROCEDURE — 99999 PR PBB SHADOW E&M-EST. PATIENT-LVL III: ICD-10-PCS | Mod: PBBFAC,,, | Performed by: OBSTETRICS & GYNECOLOGY

## 2020-08-31 PROCEDURE — 76816 OB US FOLLOW-UP PER FETUS: CPT | Mod: 26,S$PBB,, | Performed by: OBSTETRICS & GYNECOLOGY

## 2020-08-31 PROCEDURE — 36415 COLL VENOUS BLD VENIPUNCTURE: CPT

## 2020-09-01 DIAGNOSIS — Z3A.25 25 WEEKS GESTATION OF PREGNANCY: Primary | ICD-10-CM

## 2020-09-01 RX ORDER — FERROUS SULFATE 325(65) MG
325 TABLET ORAL 2 TIMES DAILY
Qty: 60 TABLET | Refills: 1 | Status: SHIPPED | OUTPATIENT
Start: 2020-09-01 | End: 2021-03-23

## 2020-09-01 RX ORDER — DOCUSATE SODIUM 100 MG/1
100 CAPSULE, LIQUID FILLED ORAL 2 TIMES DAILY PRN
Qty: 60 CAPSULE | Refills: 1 | Status: SHIPPED | OUTPATIENT
Start: 2020-09-01 | End: 2020-12-02

## 2020-09-01 NOTE — PROGRESS NOTES
25w0d here for OB visit.    Pregnancy complicated by:  AMA    No major complaints today.  Reports active fetus.  Denies vaginal bleeding, leakage of fluid, or contractions.    Pt seen MFM for anatomy earlier today:    Impression   =========   C[place]ona live intrauterine pregnancy.   Overall normal fetal growth.   Normal amniotic fluid volume.   3vv remains suboptimal. The remainder of the previously suboptimally visualized fetal anatomy was seen today and appeared normal.     Recommendation   ==============   Follow up ultrasound in 7 weeks for growth/suboptimal fetal anatomy.    Order Order 1 hr glucola, CBC.  Encourage daily low dose ASA.  Encourage participation in Connective MOM.  COVID precautions.    Labor/preE precautions.  Kick counts.  Return 4 wks or sooner prn.  Voiced understanding.      Julio Cesar Elias MD

## 2020-09-09 ENCOUNTER — LAB VISIT (OUTPATIENT)
Dept: LAB | Facility: HOSPITAL | Age: 35
End: 2020-09-09
Attending: OBSTETRICS & GYNECOLOGY
Payer: MEDICAID

## 2020-09-09 DIAGNOSIS — Z3A.25 25 WEEKS GESTATION OF PREGNANCY: ICD-10-CM

## 2020-09-09 LAB
ESTIMATED AVG GLUCOSE: 97 MG/DL (ref 68–131)
FERRITIN SERPL-MCNC: 13 NG/ML (ref 20–300)
GLUCOSE SERPL-MCNC: 152 MG/DL
GLUCOSE SERPL-MCNC: 55 MG/DL
GLUCOSE SERPL-MCNC: 80 MG/DL (ref 70–110)
GLUCOSE SERPL-MCNC: 97 MG/DL
HBA1C MFR BLD HPLC: 5 % (ref 4–5.6)
IRON SERPL-MCNC: 62 UG/DL (ref 30–160)
SATURATED IRON: 13 % (ref 20–50)
TOTAL IRON BINDING CAPACITY: 475 UG/DL (ref 250–450)
TRANSFERRIN SERPL-MCNC: 321 MG/DL (ref 200–375)

## 2020-09-09 PROCEDURE — 82728 ASSAY OF FERRITIN: CPT

## 2020-09-09 PROCEDURE — 82952 GTT-ADDED SAMPLES: CPT

## 2020-09-09 PROCEDURE — 82951 GLUCOSE TOLERANCE TEST (GTT): CPT

## 2020-09-09 PROCEDURE — 83036 HEMOGLOBIN GLYCOSYLATED A1C: CPT

## 2020-09-09 PROCEDURE — 83540 ASSAY OF IRON: CPT

## 2020-09-09 PROCEDURE — 36415 COLL VENOUS BLD VENIPUNCTURE: CPT

## 2020-09-10 PROBLEM — D50.9 IDA (IRON DEFICIENCY ANEMIA): Status: ACTIVE | Noted: 2020-03-15

## 2020-09-22 ENCOUNTER — NURSE TRIAGE (OUTPATIENT)
Dept: ADMINISTRATIVE | Facility: CLINIC | Age: 35
End: 2020-09-22

## 2020-09-22 NOTE — TELEPHONE ENCOUNTER
Reason for Disposition   Slight spotting after sexual intercourse (brief episode)    Additional Information   Negative: Passed out (i.e., lost consciousness, collapsed and was not responding)   Negative: Shock suspected (e.g., cold/pale/clammy skin, too weak to stand, low BP, rapid pulse)   Negative: Difficult to awaken or acting confused (e.g., disoriented, slurred speech)   Negative: SEVERE vaginal bleeding (e.g., continuous red blood from vagina, large blood clots)   Negative: [1] SEVERE abdominal pain (e.g., excruciating) AND [2] constant AND [3] present > 1 hour   Negative: Sounds like a life-threatening emergency to the triager   Negative: [1] Vaginal bleeding AND [2] pregnant < 20 weeks   Negative: MILD-MODERATE vaginal bleeding (i.e., small to medium clots; like mild menstrual period)   Negative: Abdominal pain or having contractions   Negative: Leakage of fluid from vagina (or caller thinks she has ruptured her bag of agarwal)   Negative: Baby moving less today (e.g., kick count < 5 in 1 hour or < 10 in 2 hours)   Negative: [1] Pregnant 24-36 weeks () AND [2] pinkish or brownish mucous discharge   Negative: [1] Pregnant 20-23 weeks AND [2] pinkish or brownish mucous discharge   Negative: [1] Pregnant > 36 weeks AND [2] pinkish or brownish mucous discharge   Negative: [1] Pregnant > 36 weeks (term) AND [2] passed a small glob or chunk of mucous (may look like gelatin or snot)    Protocols used: PREGNANCY - VAGINAL BLEEDING GREATER THAN 20 WEEKS [facility]

## 2020-09-28 ENCOUNTER — ROUTINE PRENATAL (OUTPATIENT)
Dept: OBSTETRICS AND GYNECOLOGY | Facility: CLINIC | Age: 35
End: 2020-09-28
Payer: MEDICAID

## 2020-09-28 VITALS — BODY MASS INDEX: 32.6 KG/M2 | SYSTOLIC BLOOD PRESSURE: 118 MMHG | WEIGHT: 214.38 LBS | DIASTOLIC BLOOD PRESSURE: 72 MMHG

## 2020-09-28 DIAGNOSIS — O09.529 ANTEPARTUM MULTIGRAVIDA OF ADVANCED MATERNAL AGE: ICD-10-CM

## 2020-09-28 DIAGNOSIS — Z3A.29 29 WEEKS GESTATION OF PREGNANCY: Primary | ICD-10-CM

## 2020-09-28 PROCEDURE — 99212 PR OFFICE/OUTPT VISIT, EST, LEVL II, 10-19 MIN: ICD-10-PCS | Mod: TH,S$PBB,, | Performed by: OBSTETRICS & GYNECOLOGY

## 2020-09-28 PROCEDURE — 99213 OFFICE O/P EST LOW 20 MIN: CPT | Mod: PBBFAC,TH | Performed by: OBSTETRICS & GYNECOLOGY

## 2020-09-28 PROCEDURE — 99999 PR PBB SHADOW E&M-EST. PATIENT-LVL III: ICD-10-PCS | Mod: PBBFAC,,, | Performed by: OBSTETRICS & GYNECOLOGY

## 2020-09-28 PROCEDURE — 99212 OFFICE O/P EST SF 10 MIN: CPT | Mod: TH,S$PBB,, | Performed by: OBSTETRICS & GYNECOLOGY

## 2020-09-28 PROCEDURE — 99999 PR PBB SHADOW E&M-EST. PATIENT-LVL III: CPT | Mod: PBBFAC,,, | Performed by: OBSTETRICS & GYNECOLOGY

## 2020-09-28 NOTE — PROGRESS NOTES
29w0d here for OB visit.    Pregnancy complicated by:  AMA    Pt has no major complaints today.  Reports active fetus.  Denies vaginal bleeding, leakage of fluid, or contractions.  Pt has f/u with MFM for fetal anatomy.    1 hr glucola 132.  3 hr OGTT normal.  Encourage daily low dose ASA.  Encourage participation in [facility].  COVID precautions.    Labor/preE precautions.  Kick counts.  Return 2 wks or sooner prn.  Voiced understanding.      Julio Cesar Elias MD

## 2020-10-13 ENCOUNTER — CLINICAL SUPPORT (OUTPATIENT)
Dept: OBSTETRICS AND GYNECOLOGY | Facility: CLINIC | Age: 35
End: 2020-10-13
Payer: MEDICAID

## 2020-10-13 ENCOUNTER — ROUTINE PRENATAL (OUTPATIENT)
Dept: OBSTETRICS AND GYNECOLOGY | Facility: CLINIC | Age: 35
End: 2020-10-13
Payer: MEDICAID

## 2020-10-13 VITALS
DIASTOLIC BLOOD PRESSURE: 80 MMHG | BODY MASS INDEX: 32.38 KG/M2 | SYSTOLIC BLOOD PRESSURE: 122 MMHG | WEIGHT: 212.94 LBS

## 2020-10-13 VITALS — WEIGHT: 212.94 LBS | BODY MASS INDEX: 32.38 KG/M2

## 2020-10-13 DIAGNOSIS — Z23 NEED FOR DIPHTHERIA-TETANUS-PERTUSSIS (TDAP) VACCINE: Primary | ICD-10-CM

## 2020-10-13 DIAGNOSIS — Z3A.32 32 WEEKS GESTATION OF PREGNANCY: Primary | ICD-10-CM

## 2020-10-13 PROCEDURE — 99213 OFFICE O/P EST LOW 20 MIN: CPT | Mod: PBBFAC,25 | Performed by: OBSTETRICS & GYNECOLOGY

## 2020-10-13 PROCEDURE — 99999 PR PBB SHADOW E&M-EST. PATIENT-LVL II: CPT | Mod: PBBFAC,,,

## 2020-10-13 PROCEDURE — 99999 PR PBB SHADOW E&M-EST. PATIENT-LVL III: ICD-10-PCS | Mod: PBBFAC,,, | Performed by: OBSTETRICS & GYNECOLOGY

## 2020-10-13 PROCEDURE — 99212 PR OFFICE/OUTPT VISIT, EST, LEVL II, 10-19 MIN: ICD-10-PCS | Mod: TH,S$PBB,, | Performed by: OBSTETRICS & GYNECOLOGY

## 2020-10-13 PROCEDURE — 99999 PR PBB SHADOW E&M-EST. PATIENT-LVL III: CPT | Mod: PBBFAC,,, | Performed by: OBSTETRICS & GYNECOLOGY

## 2020-10-13 PROCEDURE — 99999 PR PBB SHADOW E&M-EST. PATIENT-LVL II: ICD-10-PCS | Mod: PBBFAC,,,

## 2020-10-13 PROCEDURE — 90471 IMMUNIZATION ADMIN: CPT | Mod: PBBFAC

## 2020-10-13 PROCEDURE — 99212 OFFICE O/P EST SF 10 MIN: CPT | Mod: TH,S$PBB,, | Performed by: OBSTETRICS & GYNECOLOGY

## 2020-10-13 NOTE — PROGRESS NOTES
31w1d here for OB visit.    Pregnancy complicated by:  AMA    No major complaints today.  Reports active fetus.  Denies vaginal bleeding, leakage of fluid, or contractions.    Encourage daily low dose ASA.  Encourage participation in Conne[facility].  COVID precautions.    Labor/preE precautions.  Kick counts.  Return 2 wks or sooner prn.  Voiced understanding.      Julio Cesar Elias MD

## 2020-10-19 ENCOUNTER — PROCEDURE VISIT (OUTPATIENT)
Dept: MATERNAL FETAL MEDICINE | Facility: CLINIC | Age: 35
End: 2020-10-19
Payer: MEDICAID

## 2020-10-19 VITALS
BODY MASS INDEX: 32.01 KG/M2 | DIASTOLIC BLOOD PRESSURE: 83 MMHG | WEIGHT: 210.56 LBS | SYSTOLIC BLOOD PRESSURE: 133 MMHG

## 2020-10-19 DIAGNOSIS — O09.523 ELDERLY MULTIGRAVIDA IN THIRD TRIMESTER: ICD-10-CM

## 2020-10-19 DIAGNOSIS — Z36.89 ENCOUNTER FOR ULTRASOUND TO ASSESS FETAL GROWTH: ICD-10-CM

## 2020-10-19 DIAGNOSIS — O26.93 PREGNANCY, COMPLICATIONS OF, THIRD TRIMESTER: ICD-10-CM

## 2020-10-19 PROCEDURE — 76816 OB US FOLLOW-UP PER FETUS: CPT | Mod: PBBFAC,PO | Performed by: OBSTETRICS & GYNECOLOGY

## 2020-10-19 PROCEDURE — 76816 PR  US,PREGNANT UTERUS,F/U,TRANSABD APP: ICD-10-PCS | Mod: 26,S$PBB,, | Performed by: OBSTETRICS & GYNECOLOGY

## 2020-10-19 PROCEDURE — 76816 OB US FOLLOW-UP PER FETUS: CPT | Mod: 26,S$PBB,, | Performed by: OBSTETRICS & GYNECOLOGY

## 2020-10-26 ENCOUNTER — ROUTINE PRENATAL (OUTPATIENT)
Dept: OBSTETRICS AND GYNECOLOGY | Facility: CLINIC | Age: 35
End: 2020-10-26
Payer: MEDICAID

## 2020-10-26 VITALS
SYSTOLIC BLOOD PRESSURE: 122 MMHG | BODY MASS INDEX: 32.65 KG/M2 | WEIGHT: 214.75 LBS | DIASTOLIC BLOOD PRESSURE: 82 MMHG

## 2020-10-26 DIAGNOSIS — Z3A.33 33 WEEKS GESTATION OF PREGNANCY: Primary | ICD-10-CM

## 2020-10-26 PROCEDURE — 99999 PR PBB SHADOW E&M-EST. PATIENT-LVL III: ICD-10-PCS | Mod: PBBFAC,,, | Performed by: OBSTETRICS & GYNECOLOGY

## 2020-10-26 PROCEDURE — 99999 PR PBB SHADOW E&M-EST. PATIENT-LVL III: CPT | Mod: PBBFAC,,, | Performed by: OBSTETRICS & GYNECOLOGY

## 2020-10-26 PROCEDURE — 99213 OFFICE O/P EST LOW 20 MIN: CPT | Mod: TH,S$PBB,, | Performed by: OBSTETRICS & GYNECOLOGY

## 2020-10-26 PROCEDURE — 99213 OFFICE O/P EST LOW 20 MIN: CPT | Mod: PBBFAC | Performed by: OBSTETRICS & GYNECOLOGY

## 2020-10-26 PROCEDURE — 99213 PR OFFICE/OUTPT VISIT, EST, LEVL III, 20-29 MIN: ICD-10-PCS | Mod: TH,S$PBB,, | Performed by: OBSTETRICS & GYNECOLOGY

## 2020-10-26 NOTE — PROGRESS NOTES
33w0d here for OB visit.    Pregnancy complicated by:  AMA    Pt has no major complaints today.  Reports active fetus.  Denies vaginal bleeding, leakage of fluid, or contractions.  Encourage daily low dose ASA.  Encourage participation in Conne[facility].  COVID precautions.  Labor/preE precautions.  Kick counts.  Return 2 wks or sooner prn.  Voiced understanding.      Julio Cesar Elias MD

## 2020-11-11 ENCOUNTER — ROUTINE PRENATAL (OUTPATIENT)
Dept: OBSTETRICS AND GYNECOLOGY | Facility: CLINIC | Age: 35
End: 2020-11-11
Payer: MEDICAID

## 2020-11-11 VITALS
SYSTOLIC BLOOD PRESSURE: 118 MMHG | WEIGHT: 216.06 LBS | DIASTOLIC BLOOD PRESSURE: 76 MMHG | BODY MASS INDEX: 32.85 KG/M2

## 2020-11-11 DIAGNOSIS — Z3A.35 35 WEEKS GESTATION OF PREGNANCY: Primary | ICD-10-CM

## 2020-11-11 PROCEDURE — 99999 PR PBB SHADOW E&M-EST. PATIENT-LVL III: ICD-10-PCS | Mod: PBBFAC,,, | Performed by: OBSTETRICS & GYNECOLOGY

## 2020-11-11 PROCEDURE — 99999 PR PBB SHADOW E&M-EST. PATIENT-LVL III: CPT | Mod: PBBFAC,,, | Performed by: OBSTETRICS & GYNECOLOGY

## 2020-11-11 PROCEDURE — 99213 PR OFFICE/OUTPT VISIT, EST, LEVL III, 20-29 MIN: ICD-10-PCS | Mod: TH,S$PBB,, | Performed by: OBSTETRICS & GYNECOLOGY

## 2020-11-11 PROCEDURE — 99213 OFFICE O/P EST LOW 20 MIN: CPT | Mod: PBBFAC | Performed by: OBSTETRICS & GYNECOLOGY

## 2020-11-11 PROCEDURE — 99213 OFFICE O/P EST LOW 20 MIN: CPT | Mod: TH,S$PBB,, | Performed by: OBSTETRICS & GYNECOLOGY

## 2020-11-12 NOTE — PROGRESS NOTES
35w2d here for OB visit.    Pregnancy complicated by:  AMA    No major complaints today.  Reports active fetus.  Denies vaginal bleeding, leakage of fluid, or contractions.  Encourage daily low dose ASA.  Encourage participation in Conne[facility].  COVID precautions.  Labor/preE precautions.  Kick counts.  Return 1 wk or sooner prn.  Voiced understanding.      Julio Cesar Elias MD

## 2020-11-18 ENCOUNTER — ROUTINE PRENATAL (OUTPATIENT)
Dept: OBSTETRICS AND GYNECOLOGY | Facility: CLINIC | Age: 35
End: 2020-11-18
Payer: MEDICAID

## 2020-11-18 VITALS — SYSTOLIC BLOOD PRESSURE: 132 MMHG | WEIGHT: 213.75 LBS | BODY MASS INDEX: 32.5 KG/M2 | DIASTOLIC BLOOD PRESSURE: 80 MMHG

## 2020-11-18 DIAGNOSIS — Z3A.36 36 WEEKS GESTATION OF PREGNANCY: Primary | ICD-10-CM

## 2020-11-18 PROCEDURE — 87081 CULTURE SCREEN ONLY: CPT

## 2020-11-18 PROCEDURE — 99213 PR OFFICE/OUTPT VISIT, EST, LEVL III, 20-29 MIN: ICD-10-PCS | Mod: TH,S$PBB,, | Performed by: OBSTETRICS & GYNECOLOGY

## 2020-11-18 PROCEDURE — 99999 PR PBB SHADOW E&M-EST. PATIENT-LVL III: ICD-10-PCS | Mod: PBBFAC,,, | Performed by: OBSTETRICS & GYNECOLOGY

## 2020-11-18 PROCEDURE — 99213 OFFICE O/P EST LOW 20 MIN: CPT | Mod: TH,S$PBB,, | Performed by: OBSTETRICS & GYNECOLOGY

## 2020-11-18 PROCEDURE — 99213 OFFICE O/P EST LOW 20 MIN: CPT | Mod: PBBFAC | Performed by: OBSTETRICS & GYNECOLOGY

## 2020-11-18 PROCEDURE — 99999 PR PBB SHADOW E&M-EST. PATIENT-LVL III: CPT | Mod: PBBFAC,,, | Performed by: OBSTETRICS & GYNECOLOGY

## 2020-11-18 NOTE — PROGRESS NOTES
36w2d here for OB visit.    Pregnancy complicated by:  AMA    Pt has no major complaints today.  Reports active fetus.  Denies vaginal bleeding, leakage of fluid, or contractions.    Order GBS.  Delivery consents signed.    Encourage daily low dose ASA.  Encourage participation in Conne[facility]OM.  COVID precautions.    Labor/preE precautions.  Kick counts.  Return 1 wk or sooner prn.  Voiced understanding.      Julio Cesar Elias MD

## 2020-11-21 LAB — BACTERIA SPEC AEROBE CULT: NORMAL

## 2020-11-24 ENCOUNTER — ROUTINE PRENATAL (OUTPATIENT)
Dept: OBSTETRICS AND GYNECOLOGY | Facility: CLINIC | Age: 35
End: 2020-11-24
Payer: MEDICAID

## 2020-11-24 VITALS
DIASTOLIC BLOOD PRESSURE: 81 MMHG | BODY MASS INDEX: 32.58 KG/M2 | SYSTOLIC BLOOD PRESSURE: 136 MMHG | WEIGHT: 214.31 LBS

## 2020-11-24 DIAGNOSIS — Z3A.37 PREGNANCY WITH 37 WEEKS COMPLETED GESTATION: Primary | ICD-10-CM

## 2020-11-24 PROCEDURE — 99999 PR PBB SHADOW E&M-EST. PATIENT-LVL II: ICD-10-PCS | Mod: PBBFAC,,, | Performed by: OBSTETRICS & GYNECOLOGY

## 2020-11-24 PROCEDURE — 99213 OFFICE O/P EST LOW 20 MIN: CPT | Mod: TH,S$PBB,, | Performed by: OBSTETRICS & GYNECOLOGY

## 2020-11-24 PROCEDURE — 99213 PR OFFICE/OUTPT VISIT, EST, LEVL III, 20-29 MIN: ICD-10-PCS | Mod: TH,S$PBB,, | Performed by: OBSTETRICS & GYNECOLOGY

## 2020-11-24 PROCEDURE — 99212 OFFICE O/P EST SF 10 MIN: CPT | Mod: PBBFAC | Performed by: OBSTETRICS & GYNECOLOGY

## 2020-11-24 PROCEDURE — 99999 PR PBB SHADOW E&M-EST. PATIENT-LVL II: CPT | Mod: PBBFAC,,, | Performed by: OBSTETRICS & GYNECOLOGY

## 2020-11-27 ENCOUNTER — PATIENT MESSAGE (OUTPATIENT)
Dept: OBSTETRICS AND GYNECOLOGY | Facility: CLINIC | Age: 35
End: 2020-11-27

## 2020-12-02 ENCOUNTER — ROUTINE PRENATAL (OUTPATIENT)
Dept: OBSTETRICS AND GYNECOLOGY | Facility: CLINIC | Age: 35
End: 2020-12-02
Payer: MEDICAID

## 2020-12-02 VITALS
SYSTOLIC BLOOD PRESSURE: 122 MMHG | WEIGHT: 211.31 LBS | BODY MASS INDEX: 32.13 KG/M2 | DIASTOLIC BLOOD PRESSURE: 80 MMHG

## 2020-12-02 DIAGNOSIS — Z3A.38 38 WEEKS GESTATION OF PREGNANCY: Primary | ICD-10-CM

## 2020-12-02 PROCEDURE — 99213 OFFICE O/P EST LOW 20 MIN: CPT | Mod: TH,S$PBB,, | Performed by: OBSTETRICS & GYNECOLOGY

## 2020-12-02 PROCEDURE — 99999 PR PBB SHADOW E&M-EST. PATIENT-LVL II: ICD-10-PCS | Mod: PBBFAC,,, | Performed by: OBSTETRICS & GYNECOLOGY

## 2020-12-02 PROCEDURE — 99212 OFFICE O/P EST SF 10 MIN: CPT | Mod: PBBFAC | Performed by: OBSTETRICS & GYNECOLOGY

## 2020-12-02 PROCEDURE — 99213 PR OFFICE/OUTPT VISIT, EST, LEVL III, 20-29 MIN: ICD-10-PCS | Mod: TH,S$PBB,, | Performed by: OBSTETRICS & GYNECOLOGY

## 2020-12-02 PROCEDURE — 99999 PR PBB SHADOW E&M-EST. PATIENT-LVL II: CPT | Mod: PBBFAC,,, | Performed by: OBSTETRICS & GYNECOLOGY

## 2020-12-02 NOTE — PROGRESS NOTES
38w2d here for OB visit.    Pregnancy complicated by:  AMA    No major complaints today.  Reports active fetus.  Denies vaginal bleeding, leakage of fluid, or contractions.    Encourage daily low dose ASA.  Encourage participation in Conne[facility].  COVID precautions.    Labor/preE precautions.  Kick counts.  Return 1 wk or sooner prn.  Voiced understanding.      Julio Cesar Elias MD

## 2020-12-09 ENCOUNTER — HOSPITAL ENCOUNTER (INPATIENT)
Facility: HOSPITAL | Age: 35
LOS: 2 days | Discharge: HOME | End: 2020-12-11
Attending: OBSTETRICS & GYNECOLOGY | Admitting: OBSTETRICS & GYNECOLOGY
Payer: MEDICAID

## 2020-12-09 DIAGNOSIS — Z3A.39 39 WEEKS GESTATION OF PREGNANCY: ICD-10-CM

## 2020-12-09 DIAGNOSIS — Z3A.38 38 WEEKS GESTATION OF PREGNANCY: ICD-10-CM

## 2020-12-09 DIAGNOSIS — O13.3 GESTATIONAL HYPERTENSION, THIRD TRIMESTER: ICD-10-CM

## 2020-12-09 DIAGNOSIS — Z3A.39 39 WEEKS GESTATION OF PREGNANCY: Primary | ICD-10-CM

## 2020-12-09 LAB
ABO + RH BLD: NORMAL
ALBUMIN SERPL BCP-MCNC: 3.1 G/DL (ref 3.5–5.2)
ALP SERPL-CCNC: 111 U/L (ref 55–135)
ALT SERPL W/O P-5'-P-CCNC: <5 U/L (ref 10–44)
ANION GAP SERPL CALC-SCNC: 9 MMOL/L (ref 8–16)
AST SERPL-CCNC: 10 U/L (ref 10–40)
BASOPHILS # BLD AUTO: 0.02 K/UL (ref 0–0.2)
BASOPHILS NFR BLD: 0.3 % (ref 0–1.9)
BILIRUB SERPL-MCNC: 0.3 MG/DL (ref 0.1–1)
BLD GP AB SCN CELLS X3 SERPL QL: NORMAL
BUN SERPL-MCNC: 6 MG/DL (ref 6–20)
CALCIUM SERPL-MCNC: 8.4 MG/DL (ref 8.7–10.5)
CHLORIDE SERPL-SCNC: 108 MMOL/L (ref 95–110)
CO2 SERPL-SCNC: 22 MMOL/L (ref 23–29)
CREAT SERPL-MCNC: 0.6 MG/DL (ref 0.5–1.4)
CREAT UR-MCNC: 254.7 MG/DL (ref 15–325)
DIFFERENTIAL METHOD: ABNORMAL
EOSINOPHIL # BLD AUTO: 0.1 K/UL (ref 0–0.5)
EOSINOPHIL NFR BLD: 1 % (ref 0–8)
ERYTHROCYTE [DISTWIDTH] IN BLOOD BY AUTOMATED COUNT: 13.6 % (ref 11.5–14.5)
EST. GFR  (AFRICAN AMERICAN): >60 ML/MIN/1.73 M^2
EST. GFR  (NON AFRICAN AMERICAN): >60 ML/MIN/1.73 M^2
GLUCOSE SERPL-MCNC: 89 MG/DL (ref 70–110)
HCT VFR BLD AUTO: 30.4 % (ref 37–48.5)
HGB BLD-MCNC: 10 G/DL (ref 12–16)
IMM GRANULOCYTES # BLD AUTO: 0.04 K/UL (ref 0–0.04)
IMM GRANULOCYTES NFR BLD AUTO: 0.5 % (ref 0–0.5)
LDH SERPL L TO P-CCNC: 123 U/L (ref 110–260)
LYMPHOCYTES # BLD AUTO: 1.4 K/UL (ref 1–4.8)
LYMPHOCYTES NFR BLD: 19.6 % (ref 18–48)
MCH RBC QN AUTO: 26.7 PG (ref 27–31)
MCHC RBC AUTO-ENTMCNC: 32.9 G/DL (ref 32–36)
MCV RBC AUTO: 81 FL (ref 82–98)
MONOCYTES # BLD AUTO: 0.6 K/UL (ref 0.3–1)
MONOCYTES NFR BLD: 8 % (ref 4–15)
NEUTROPHILS # BLD AUTO: 5.2 K/UL (ref 1.8–7.7)
NEUTROPHILS NFR BLD: 70.6 % (ref 38–73)
NRBC BLD-RTO: 0 /100 WBC
PLATELET # BLD AUTO: 170 K/UL (ref 150–350)
PMV BLD AUTO: 11.6 FL (ref 9.2–12.9)
POTASSIUM SERPL-SCNC: 3.5 MMOL/L (ref 3.5–5.1)
PROT SERPL-MCNC: 6.8 G/DL (ref 6–8.4)
PROT UR-MCNC: 25 MG/DL
PROT/CREAT UR: 0.1 MG/G{CREAT} (ref 0–0.2)
RBC # BLD AUTO: 3.75 M/UL (ref 4–5.4)
SARS-COV-2 RDRP RESP QL NAA+PROBE: NEGATIVE
SODIUM SERPL-SCNC: 139 MMOL/L (ref 136–145)
URATE SERPL-MCNC: 4.6 MG/DL (ref 2.4–5.7)
WBC # BLD AUTO: 7.29 K/UL (ref 3.9–12.7)

## 2020-12-09 PROCEDURE — 80053 COMPREHEN METABOLIC PANEL: CPT

## 2020-12-09 PROCEDURE — 86592 SYPHILIS TEST NON-TREP QUAL: CPT

## 2020-12-09 PROCEDURE — 83615 LACTATE (LD) (LDH) ENZYME: CPT

## 2020-12-09 PROCEDURE — 85025 COMPLETE CBC W/AUTO DIFF WBC: CPT

## 2020-12-09 PROCEDURE — U0002 COVID-19 LAB TEST NON-CDC: HCPCS

## 2020-12-09 PROCEDURE — 63600175 PHARM REV CODE 636 W HCPCS: Performed by: OBSTETRICS & GYNECOLOGY

## 2020-12-09 PROCEDURE — 86901 BLOOD TYPING SEROLOGIC RH(D): CPT

## 2020-12-09 PROCEDURE — 84550 ASSAY OF BLOOD/URIC ACID: CPT

## 2020-12-09 PROCEDURE — 25000003 PHARM REV CODE 250: Performed by: OBSTETRICS & GYNECOLOGY

## 2020-12-09 PROCEDURE — 84156 ASSAY OF PROTEIN URINE: CPT

## 2020-12-09 PROCEDURE — 96360 HYDRATION IV INFUSION INIT: CPT

## 2020-12-09 PROCEDURE — 11000001 HC ACUTE MED/SURG PRIVATE ROOM

## 2020-12-09 RX ORDER — ONDANSETRON 8 MG/1
8 TABLET, ORALLY DISINTEGRATING ORAL EVERY 8 HOURS PRN
Status: DISCONTINUED | OUTPATIENT
Start: 2020-12-09 | End: 2020-12-10

## 2020-12-09 RX ORDER — CALCIUM CARBONATE 200(500)MG
500 TABLET,CHEWABLE ORAL 3 TIMES DAILY PRN
Status: CANCELLED | OUTPATIENT
Start: 2020-12-09

## 2020-12-09 RX ORDER — SODIUM CHLORIDE, SODIUM LACTATE, POTASSIUM CHLORIDE, CALCIUM CHLORIDE 600; 310; 30; 20 MG/100ML; MG/100ML; MG/100ML; MG/100ML
INJECTION, SOLUTION INTRAVENOUS CONTINUOUS
Status: DISCONTINUED | OUTPATIENT
Start: 2020-12-09 | End: 2020-12-10

## 2020-12-09 RX ORDER — BUTORPHANOL TARTRATE 1 MG/ML
1 INJECTION INTRAMUSCULAR; INTRAVENOUS
Status: CANCELLED | OUTPATIENT
Start: 2020-12-09

## 2020-12-09 RX ORDER — BUTORPHANOL TARTRATE 1 MG/ML
1 INJECTION INTRAMUSCULAR; INTRAVENOUS
Status: DISCONTINUED | OUTPATIENT
Start: 2020-12-09 | End: 2020-12-10

## 2020-12-09 RX ORDER — OXYTOCIN/RINGER'S LACTATE 30/500 ML
0-20 PLASTIC BAG, INJECTION (ML) INTRAVENOUS CONTINUOUS
Status: DISCONTINUED | OUTPATIENT
Start: 2020-12-10 | End: 2020-12-10

## 2020-12-09 RX ORDER — OXYTOCIN/RINGER'S LACTATE 30/500 ML
0-20 PLASTIC BAG, INJECTION (ML) INTRAVENOUS CONTINUOUS
Status: DISCONTINUED | OUTPATIENT
Start: 2020-12-10 | End: 2020-12-09

## 2020-12-09 RX ORDER — SIMETHICONE 80 MG
1 TABLET,CHEWABLE ORAL 4 TIMES DAILY PRN
Status: CANCELLED | OUTPATIENT
Start: 2020-12-09

## 2020-12-09 RX ORDER — ONDANSETRON 4 MG/1
8 TABLET, ORALLY DISINTEGRATING ORAL EVERY 8 HOURS PRN
Status: CANCELLED | OUTPATIENT
Start: 2020-12-09

## 2020-12-09 RX ORDER — OXYTOCIN/RINGER'S LACTATE 30/500 ML
334 PLASTIC BAG, INJECTION (ML) INTRAVENOUS ONCE
Status: DISCONTINUED | OUTPATIENT
Start: 2020-12-09 | End: 2020-12-10

## 2020-12-09 RX ORDER — OXYTOCIN/RINGER'S LACTATE 30/500 ML
95 PLASTIC BAG, INJECTION (ML) INTRAVENOUS ONCE
Status: DISCONTINUED | OUTPATIENT
Start: 2020-12-09 | End: 2020-12-10

## 2020-12-09 RX ORDER — SODIUM CHLORIDE, SODIUM LACTATE, POTASSIUM CHLORIDE, CALCIUM CHLORIDE 600; 310; 30; 20 MG/100ML; MG/100ML; MG/100ML; MG/100ML
INJECTION, SOLUTION INTRAVENOUS CONTINUOUS
Status: CANCELLED | OUTPATIENT
Start: 2020-12-09

## 2020-12-09 RX ORDER — OXYTOCIN/RINGER'S LACTATE 30/500 ML
0-20 PLASTIC BAG, INJECTION (ML) INTRAVENOUS CONTINUOUS
Status: CANCELLED | OUTPATIENT
Start: 2020-12-10

## 2020-12-09 RX ORDER — OXYTOCIN/RINGER'S LACTATE 30/500 ML
95 PLASTIC BAG, INJECTION (ML) INTRAVENOUS ONCE
Status: CANCELLED | OUTPATIENT
Start: 2020-12-09 | End: 2020-12-09

## 2020-12-09 RX ORDER — OXYTOCIN/RINGER'S LACTATE 30/500 ML
334 PLASTIC BAG, INJECTION (ML) INTRAVENOUS ONCE
Status: CANCELLED | OUTPATIENT
Start: 2020-12-09 | End: 2020-12-09

## 2020-12-09 RX ORDER — SIMETHICONE 80 MG
1 TABLET,CHEWABLE ORAL 4 TIMES DAILY PRN
Status: DISCONTINUED | OUTPATIENT
Start: 2020-12-09 | End: 2020-12-10

## 2020-12-09 RX ORDER — CALCIUM CARBONATE 200(500)MG
500 TABLET,CHEWABLE ORAL 3 TIMES DAILY PRN
Status: DISCONTINUED | OUTPATIENT
Start: 2020-12-09 | End: 2020-12-10

## 2020-12-09 RX ADMIN — SODIUM CHLORIDE, SODIUM LACTATE, POTASSIUM CHLORIDE, AND CALCIUM CHLORIDE: .6; .31; .03; .02 INJECTION, SOLUTION INTRAVENOUS at 07:12

## 2020-12-09 RX ADMIN — DINOPROSTONE 10 MG: 10 INSERT VAGINAL at 01:12

## 2020-12-09 RX ADMIN — SODIUM CHLORIDE, SODIUM LACTATE, POTASSIUM CHLORIDE, AND CALCIUM CHLORIDE: .6; .31; .03; .02 INJECTION, SOLUTION INTRAVENOUS at 05:12

## 2020-12-09 NOTE — NURSING
Pt presents for IOL for elevated Bps. Placed on toco and efm. Abd palpates soft and nontender. Bps normotensive at this time x2. Denies ctx/lof/vag bleeding. States +fm. No significant medical hx this pregnancy.

## 2020-12-10 ENCOUNTER — ANESTHESIA EVENT (OUTPATIENT)
Dept: OBSTETRICS AND GYNECOLOGY | Facility: HOSPITAL | Age: 35
End: 2020-12-10
Payer: MEDICAID

## 2020-12-10 ENCOUNTER — ANESTHESIA (OUTPATIENT)
Dept: OBSTETRICS AND GYNECOLOGY | Facility: HOSPITAL | Age: 35
End: 2020-12-10
Payer: MEDICAID

## 2020-12-10 LAB — RPR SER QL: NORMAL

## 2020-12-10 PROCEDURE — 11000001 HC ACUTE MED/SURG PRIVATE ROOM

## 2020-12-10 PROCEDURE — 72200005 HC VAGINAL DELIVERY LEVEL II

## 2020-12-10 PROCEDURE — 72100002 HC LABOR CARE, 1ST 8 HOURS

## 2020-12-10 PROCEDURE — 59409 PRA ETRICAL CARE,VAG DELIV ONLY: ICD-10-PCS | Mod: AA,,, | Performed by: ANESTHESIOLOGY

## 2020-12-10 PROCEDURE — 63600175 PHARM REV CODE 636 W HCPCS: Performed by: OBSTETRICS & GYNECOLOGY

## 2020-12-10 PROCEDURE — 59409 OBSTETRICAL CARE: CPT | Mod: AA,,, | Performed by: ANESTHESIOLOGY

## 2020-12-10 PROCEDURE — 72100003 HC LABOR CARE, EA. ADDL. 8 HRS

## 2020-12-10 PROCEDURE — 59409 OBSTETRICAL CARE: CPT | Mod: GB,,, | Performed by: OBSTETRICS & GYNECOLOGY

## 2020-12-10 PROCEDURE — 51702 INSERT TEMP BLADDER CATH: CPT

## 2020-12-10 PROCEDURE — 62326 NJX INTERLAMINAR LMBR/SAC: CPT | Performed by: ANESTHESIOLOGY

## 2020-12-10 PROCEDURE — C1751 CATH, INF, PER/CENT/MIDLINE: HCPCS | Performed by: ANESTHESIOLOGY

## 2020-12-10 PROCEDURE — 25000003 PHARM REV CODE 250: Performed by: ANESTHESIOLOGY

## 2020-12-10 PROCEDURE — 59409 PR OBSTETRICAL CARE,VAG DELIV ONLY: ICD-10-PCS | Mod: GB,,, | Performed by: OBSTETRICS & GYNECOLOGY

## 2020-12-10 PROCEDURE — 27200710 HC EPIDURAL INFUSION PUMP SET: Performed by: ANESTHESIOLOGY

## 2020-12-10 RX ORDER — HYDROCORTISONE 25 MG/G
CREAM TOPICAL 3 TIMES DAILY PRN
Status: DISCONTINUED | OUTPATIENT
Start: 2020-12-10 | End: 2020-12-11 | Stop reason: HOSPADM

## 2020-12-10 RX ORDER — SIMETHICONE 80 MG
1 TABLET,CHEWABLE ORAL EVERY 6 HOURS PRN
Status: DISCONTINUED | OUTPATIENT
Start: 2020-12-10 | End: 2020-12-11 | Stop reason: HOSPADM

## 2020-12-10 RX ORDER — LIDOCAINE HYDROCHLORIDE AND EPINEPHRINE 15; 5 MG/ML; UG/ML
INJECTION, SOLUTION EPIDURAL
Status: DISCONTINUED | OUTPATIENT
Start: 2020-12-10 | End: 2020-12-10

## 2020-12-10 RX ORDER — OXYCODONE AND ACETAMINOPHEN 10; 325 MG/1; MG/1
1 TABLET ORAL EVERY 4 HOURS PRN
Status: DISCONTINUED | OUTPATIENT
Start: 2020-12-10 | End: 2020-12-11 | Stop reason: HOSPADM

## 2020-12-10 RX ORDER — OXYCODONE AND ACETAMINOPHEN 5; 325 MG/1; MG/1
1 TABLET ORAL EVERY 4 HOURS PRN
Status: DISCONTINUED | OUTPATIENT
Start: 2020-12-10 | End: 2020-12-11 | Stop reason: HOSPADM

## 2020-12-10 RX ORDER — ONDANSETRON 8 MG/1
8 TABLET, ORALLY DISINTEGRATING ORAL EVERY 8 HOURS PRN
Status: DISCONTINUED | OUTPATIENT
Start: 2020-12-10 | End: 2020-12-11 | Stop reason: HOSPADM

## 2020-12-10 RX ORDER — FENTANYL/BUPIVACAINE/NS/PF 2MCG/ML-.1
PLASTIC BAG, INJECTION (ML) INJECTION CONTINUOUS PRN
Status: DISCONTINUED | OUTPATIENT
Start: 2020-12-10 | End: 2020-12-10

## 2020-12-10 RX ORDER — OXYTOCIN/RINGER'S LACTATE 30/500 ML
41.65 PLASTIC BAG, INJECTION (ML) INTRAVENOUS CONTINUOUS
Status: ACTIVE | OUTPATIENT
Start: 2020-12-10 | End: 2020-12-10

## 2020-12-10 RX ORDER — DOCUSATE SODIUM 100 MG/1
200 CAPSULE, LIQUID FILLED ORAL 2 TIMES DAILY PRN
Status: DISCONTINUED | OUTPATIENT
Start: 2020-12-10 | End: 2020-12-11 | Stop reason: HOSPADM

## 2020-12-10 RX ORDER — MISOPROSTOL 200 UG/1
600 TABLET ORAL
Status: DISCONTINUED | OUTPATIENT
Start: 2020-12-10 | End: 2020-12-10

## 2020-12-10 RX ORDER — DIPHENHYDRAMINE HCL 25 MG
25 CAPSULE ORAL EVERY 4 HOURS PRN
Status: DISCONTINUED | OUTPATIENT
Start: 2020-12-10 | End: 2020-12-11 | Stop reason: HOSPADM

## 2020-12-10 RX ORDER — BUPIVACAINE HYDROCHLORIDE 2.5 MG/ML
INJECTION, SOLUTION EPIDURAL; INFILTRATION; INTRACAUDAL
Status: DISCONTINUED | OUTPATIENT
Start: 2020-12-10 | End: 2020-12-10

## 2020-12-10 RX ORDER — IBUPROFEN 600 MG/1
600 TABLET ORAL EVERY 6 HOURS PRN
Status: DISCONTINUED | OUTPATIENT
Start: 2020-12-10 | End: 2020-12-11 | Stop reason: HOSPADM

## 2020-12-10 RX ADMIN — Medication 2 MILLI-UNITS/MIN: at 04:12

## 2020-12-10 RX ADMIN — BUPIVACAINE HYDROCHLORIDE 3 ML: 2.5 INJECTION, SOLUTION EPIDURAL; INFILTRATION; INTRACAUDAL; PERINEURAL at 08:12

## 2020-12-10 RX ADMIN — BUPIVACAINE HYDROCHLORIDE 2 ML: 2.5 INJECTION, SOLUTION EPIDURAL; INFILTRATION; INTRACAUDAL; PERINEURAL at 08:12

## 2020-12-10 RX ADMIN — Medication 10 ML/HR: at 08:12

## 2020-12-10 RX ADMIN — SODIUM CHLORIDE, SODIUM LACTATE, POTASSIUM CHLORIDE, AND CALCIUM CHLORIDE: .6; .31; .03; .02 INJECTION, SOLUTION INTRAVENOUS at 04:12

## 2020-12-10 RX ADMIN — LIDOCAINE HYDROCHLORIDE,EPINEPHRINE BITARTRATE 3 ML: 15; .005 INJECTION, SOLUTION EPIDURAL; INFILTRATION; INTRACAUDAL; PERINEURAL at 08:12

## 2020-12-10 NOTE — NURSING
1207-RN call to MD Elias with update.  Pt now 10/100/0 and FHT reassuring with deep variables during contractions.  MD gave telephone order to continue to labor down naturally and call when pt ready for delivery.

## 2020-12-10 NOTE — L&D DELIVERY NOTE
[facility]   Delivery Summary  Obstetrics & Gynecology       Date of Delivery:  12/10/2020        Preoperative Diagnosis:    [place] gestation at 39w3d in active labor  Gestational hypertension    Postoperative Diagnosis:    [place] gestation at 39w3d s/p spontaneous vaginal delivery  Live infant  Gestational hypertension  Procedure Performed:  Vaginal delivery    Indication (HPI):     See chart for complete details.  In brief, this is a 35 y.o.  at 39w3d admitted for induction of labor secondary to gestational hypertension.  The induction was initiated with cervidil x 1 dose followed by pitocin.  Pt progressed well through the active phase of labor and delivered a viable infant.  Maternal and fetal status was overall reassuring throughout the labor course.  SROM at 12/10/2020 ~7AM with moderate, clear fluid, no odor and had no intrapartum fever.  BP has been in the normal to mild range intrapartum and did not require treatment, asymptomatic.  Pt was informed of the risks, benefits, alternatives and possible complications to a vaginal delivery.  Informed consent was obtained for delivery and blood transfusion.      Anesthesia:  Epidural     EBL:  150 mL with no replacements    Specimens:  Cord blood    Findings, Infant & Apgars:      Live female infant, APGAR 1 min: 9, 5 min: 9, transfer to well baby  Weight pending at time of note   SROM 12/10/2020 ~7AM with moderate, clear fluid, no odor, no intrapartum fever   MARYANN    No laceration    Complications:  None    Delivery Summary:      Vaginal delivery of viable infant.  Infant delivered after 6 minutes of pushing.  No nuchal cord.  No shoulder dystocia.  No laceration.  Episiotomy was not performed.  The infant was vigorous with a strong cry.  Cord blood was collected.   The placenta delivered spontaneously intact with three vessel cord.    Uterine massage and 20 units of Pitocin IV were given until the fundus was firm.    Hemostasis  was noted.    No sponges were left in the vagina.   Sponge, lap, needle, and instrument counts were correct time two.   Mother and baby were bonding well at the end of the delivery both in stable condition.   Findings and expectations were discussed with the patient.   All of pt questions were answered to her satisfaction, pt voiced understanding.      Julio Cesar Elias MD

## 2020-12-10 NOTE — H&P
[facility]    Obstetrics & Gynecology  History & Physical      Patient Name:  Ange Chang  MRN:  [id number]  Admission Date:  2020  Hospital Length of Stay:  0  Attending Physician:  Julio Cesar Elias MD  Primary Care Provider:  Dnaa Germain MD    Chief Complaint:  Induction of labor    History of Present Illness:      Ange Chang is a 35 y.o.  at 39w2d admitted for induction of labor secondary to gestational hypertension.  Pt was noted to have elevated BP at home via Connected [facility].  Pt denies headaches, vision changes, epigastric pain, or acute swelling. Pt has no major complaints.  Pt reports active fetal movements and occasional mild contractions, and denies any vaginal bleeding or leakage of fluid.  Pregnancy thus far has been overall uneventful.    Past Medical History:      None      Past Surgical History:     Vertical sleeve - 2013    Medications:     Prenatal vitamins   Fergon    Allergies:     NKDA      Obstetric History:      OB History    Para Term  AB Living   3 2 2 0 0 2   SAB TAB Ectopic Multiple Live Births   0 0 0 0 2      # Outcome Date GA Lbr Urban/2nd Weight Sex Delivery Anes PTL Lv   3 Current            2 Term 19 38w6d  3.33 kg (7 lb 5.5 oz) M Vag-Spont EPI N NICOLE      Name: DARIEN CHANG ANGE      Apgar1: 9  Apgar5: 9   1 Term 18 39w4d / 03:38 3.24 kg (7 lb 2.3 oz) F Vag-Spont EPI N NICOLE      Name: LEANA CHANG ANGE      Apgar1: 8  Apgar5: 9     Gynecologic History:      Denies recent/active STI  Denies history abnormal Pap     Social History:      Denies tobacco, alcohol or illicit drug use  Current partner is father of baby  Denies domestic abuse     Family History:     Denies congenital anomalies, inherited syndromes, fetal aneuploidy    Review of Systems   Constitutional: Negative.    HENT: Negative.    Eyes: Negative.    Respiratory: Negative.    Cardiovascular: Negative.    Gastrointestinal: Negative.   "  Endocrine: Negative.    Genitourinary: Negative.    Musculoskeletal: Positive for back pain.   Integumentary:  Negative.   Neurological: Negative.    Hematological: Negative.    Psychiatric/Behavioral: Negative.    Breast: negative.       Vitals:    Temp:  [98 °F (36.7 °C)] 98 °F (36.7 °C)  Pulse:  [] 83  Resp:  [16] 16  SpO2:  [96 %-99 %] 98 %  BP: (118-146)/(71-88) 124/81    /81   Pulse 83   Temp 98 °F (36.7 °C) (Oral)   Resp 16   Ht 5' 7" (1.702 m)   Wt 95.3 kg (210 lb)   LMP 03/13/2020   SpO2 98%   Breastfeeding No   BMI 32.89 kg/m²     Physical Exam:   Constitutional: She appears well-developed. No distress.                             Alert and oriented to person, place and time.  HEENT:  Normocephalic, atraumatic, anicteric, EOMI, moist mucus membranes.  Neck supple without masses.  LUNGS:  Clear to auscultation bilaterally.  HEART:  Regular rate & rhythm with physiologic heart sounds.  ABDOMEN:  Soft, non tender without any guarding, rigidity or rebound. Normoactive bowel sounds.  PELVIC:  Normal female external genitalia without gross lesions, rashes or excoriations. No gross vaginal or cervical lesions.  Adequate pelvis.  Cervix: 0.5 cm dilated/40% effaced/-3 station/posterior  EXTREMITIES:  Symmetric without cramping, claudication. Trace edema LE. +2 distal pulses.  Full range of motion.  SKIN:  No rashes, good turgor & capillary refill.  NEUROLOGIC:  Grossly intact bilaterally. +2 DTR, symmetric.  PSYCH:  Mood & affect appropriate.       Laboratory Data:     Lab Results   Component Value Date    WBC 7.29 12/09/2020    HGB 10.0 (L) 12/09/2020    HCT 30.4 (L) 12/09/2020    MCV 81 (L) 12/09/2020     12/09/2020     BMP  Lab Results   Component Value Date     12/09/2020    K 3.5 12/09/2020     12/09/2020    CO2 22 (L) 12/09/2020    BUN 6 12/09/2020    CREATININE 0.6 12/09/2020    CALCIUM 8.4 (L) 12/09/2020    ANIONGAP 9 12/09/2020    ESTGFRAFRICA >60 12/09/2020    " EGFRNONAA >60 2020     Lab Results   Component Value Date    ALT <5 (L) 2020    AST 10 2020    ALKPHOS 111 2020    BILITOT 0.3 2020   LDH  123  Uric acid  4.6    P/C ratio  0.1    ABO:  O POS  GBS:  negative    NST    Category: 1  Tocometry: irritability      Presentation: cephalic     EFW ~7 lb by Leopold's     Assessment:     1. 35 y.o.  at 39w2d for induction of labor secondary to gestational hypertension  2. AMA  3. Anemia, iron deficiency      Plan:    Admit to L&D for induction of labor    Pt was informed of the risks, benefits, alternatives and possible complications to induction of labor (with cervidil, cytotec, pitocin, and/or garg bulb), vaginal delivery, operative vaginal delivery,  section, blood transfusion, and the possibility of failed labor induction resulting in a  section due to maternal or fetal indications.  All questions were answered to her satisfaction.  Pt  voiced understanding and acceptance of these risks, and wishes to proceed with induction of labor.  Informed consents were obtained for delivery and blood transfusions.    Routine admit labs    Continuous fetal monitoring    Consult anesthesia, epidural prn    Fe supplementation    Monitor BP, treat as clinically indicated, will consider mag sulfate for seizure prophylaxis if worsening HTN, or signs/sxs of preeclampsia      Julio Cesar Elias MD

## 2020-12-10 NOTE — NURSING
0650-Handoff report received from Shanika RN.  Per PM RN, pt likely SROM'd at 0620am with moderate amount of clear fluid on pad.  SROM  not charted in EMR.    0700-Pt requests epidural.  LR fluid bolus started.

## 2020-12-10 NOTE — NURSING
1445-Pt ambulated to bathroom with RN assist.  Voided 500cc without difficulty.  Pericare done, gown changed, linens changed, and pt back to bed in no distress or discomfort.

## 2020-12-10 NOTE — ANESTHESIA PREPROCEDURE EVALUATION
12/10/2020  Pilar Hernandez is a 35 y.o., female.    Anesthesia Evaluation     I have reviewed the Nursing Notes.       Review of Systems  Anesthesia Hx:  No problems with previous Anesthesia   Social:  Non-Smoker    Cardiovascular:  Cardiovascular Normal Exercise tolerance: good     Pulmonary:  Pulmonary Normal    Renal/:  Renal/ Normal     Hepatic/GI:   No Bowel Prep. Denies PUD. Denies Liver Disease. Denies Hepatitis.    Neurological:  Neurology Normal    Endocrine:  Endocrine Normal        Physical Exam  General:  Obesity    Airway/Jaw/Neck:  AIRWAY FINDINGS: Normal      Chest/Lungs:  Chest/Lungs Clear    Heart/Vascular:  Heart Findings: Normal       Mental Status:  Mental Status Findings: Normal        Anesthesia Plan  Type of Anesthesia, risks & benefits discussed:  Anesthesia Type:  epidural  Patient's Preference:   Intra-op Monitoring Plan: standard ASA monitors  Intra-op Monitoring Plan Comments:   Post Op Pain Control Plan:   Post Op Pain Control Plan Comments:   Induction:   IV  Beta Blocker:  Patient is not currently on a Beta-Blocker (No further documentation required).       Informed Consent: Patient understands risks and agrees with Anesthesia plan.  Questions answered. Anesthesia consent signed with patient.  ASA Score: 2     Day of Surgery Review of History & Physical:  There are no significant changes.  H&P update referred to the provider.         Ready For Surgery From Anesthesia Perspective.

## 2020-12-10 NOTE — NURSING
1116-RN updated MD Elias on FHT (variables with moderate variability and accels) and SVE 7/80/-1.

## 2020-12-10 NOTE — ANESTHESIA PROCEDURE NOTES
Epidural    Patient location during procedure: OB   Reason for block: primary anesthetic   Diagnosis: IUP   Start time: 12/10/2020 8:05 AM  Timeout: 12/10/2020 8:05 AM  End time: 12/10/2020 8:25 AM    Staffing  Performing Provider: Kevin Lloyd MD  Authorizing Provider: Kevin Lloyd MD        Preanesthetic Checklist  Completed: patient identified, pre-op evaluation, timeout performed, IV checked, risks and benefits discussed, monitors and equipment checked, anesthesia consent given, hand hygiene performed and patient being monitored  Preparation  Patient position: sitting  Prep: ChloraPrep  Patient monitoring: ECG, Pulse Ox and Blood Pressure  Epidural  Skin Anesthetic: lidocaine 1%  Skin Wheal: 3 mL  Administration type: continuous  Approach: midline  Interspace: L4-5    Injection technique: JAYLEEN saline  Needle and Epidural Catheter  Needle type: Tuohy   Needle gauge: 17  Needle length: 3.5 inches  Needle insertion depth: 8 cm  Catheter type: springwound and multi-orifice  Catheter size: 19 G  Catheter at skin depth: 13.5 cm  Test dose: 3 mL of lidocaine 1.5% with Epi 1-to-200,000  Additional Documentation: no paresthesia on injection, no significant pain on injection, negative aspiration for heme and CSF, no significant complaints from patient and no signs/symptoms of IV or SA injection  Needle localization: anatomical landmarks  Medications:  Volume per aspiration: 4 mL  Time between aspirations: 3 minutes

## 2020-12-10 NOTE — PLAN OF CARE
Tolerating regular diet.  Voiding and passing flatus.  Verbalizes pain control without need for prn pain medications.  Ambulating in room to bathroom.  Verbalizes knowledge of plan of care.  Requesting flu shot prior to discharge and would like discharge tomorrow if possible

## 2020-12-11 VITALS
TEMPERATURE: 97 F | WEIGHT: 210 LBS | SYSTOLIC BLOOD PRESSURE: 134 MMHG | DIASTOLIC BLOOD PRESSURE: 83 MMHG | HEART RATE: 73 BPM | RESPIRATION RATE: 17 BRPM | BODY MASS INDEX: 32.96 KG/M2 | OXYGEN SATURATION: 97 % | HEIGHT: 67 IN

## 2020-12-11 PROBLEM — O13.3 GESTATIONAL HYPERTENSION, THIRD TRIMESTER: Status: ACTIVE | Noted: 2020-12-11

## 2020-12-11 PROBLEM — Z3A.39 39 WEEKS GESTATION OF PREGNANCY: Status: RESOLVED | Noted: 2020-12-09 | Resolved: 2020-12-11

## 2020-12-11 LAB
BASOPHILS # BLD AUTO: 0.04 K/UL (ref 0–0.2)
BASOPHILS NFR BLD: 0.5 % (ref 0–1.9)
DIFFERENTIAL METHOD: ABNORMAL
EOSINOPHIL # BLD AUTO: 0.1 K/UL (ref 0–0.5)
EOSINOPHIL NFR BLD: 0.9 % (ref 0–8)
ERYTHROCYTE [DISTWIDTH] IN BLOOD BY AUTOMATED COUNT: 13.8 % (ref 11.5–14.5)
HCT VFR BLD AUTO: 29.3 % (ref 37–48.5)
HGB BLD-MCNC: 9.4 G/DL (ref 12–16)
IMM GRANULOCYTES # BLD AUTO: 0.05 K/UL (ref 0–0.04)
IMM GRANULOCYTES NFR BLD AUTO: 0.6 % (ref 0–0.5)
LYMPHOCYTES # BLD AUTO: 1.6 K/UL (ref 1–4.8)
LYMPHOCYTES NFR BLD: 17.8 % (ref 18–48)
MCH RBC QN AUTO: 26.5 PG (ref 27–31)
MCHC RBC AUTO-ENTMCNC: 32.1 G/DL (ref 32–36)
MCV RBC AUTO: 83 FL (ref 82–98)
MONOCYTES # BLD AUTO: 0.7 K/UL (ref 0.3–1)
MONOCYTES NFR BLD: 7.4 % (ref 4–15)
NEUTROPHILS # BLD AUTO: 6.4 K/UL (ref 1.8–7.7)
NEUTROPHILS NFR BLD: 72.8 % (ref 38–73)
NRBC BLD-RTO: 0 /100 WBC
PLATELET # BLD AUTO: 158 K/UL (ref 150–350)
PMV BLD AUTO: 11.7 FL (ref 9.2–12.9)
RBC # BLD AUTO: 3.55 M/UL (ref 4–5.4)
WBC # BLD AUTO: 8.75 K/UL (ref 3.9–12.7)

## 2020-12-11 PROCEDURE — 85025 COMPLETE CBC W/AUTO DIFF WBC: CPT

## 2020-12-11 PROCEDURE — 36415 COLL VENOUS BLD VENIPUNCTURE: CPT

## 2020-12-11 PROCEDURE — 90686 IIV4 VACC NO PRSV 0.5 ML IM: CPT | Performed by: OBSTETRICS & GYNECOLOGY

## 2020-12-11 PROCEDURE — 99238 HOSP IP/OBS DSCHRG MGMT 30/<: CPT | Mod: ,,, | Performed by: OBSTETRICS & GYNECOLOGY

## 2020-12-11 PROCEDURE — 90471 IMMUNIZATION ADMIN: CPT | Performed by: OBSTETRICS & GYNECOLOGY

## 2020-12-11 PROCEDURE — 99238 PR HOSPITAL DISCHARGE DAY,<30 MIN: ICD-10-PCS | Mod: ,,, | Performed by: OBSTETRICS & GYNECOLOGY

## 2020-12-11 PROCEDURE — 63600175 PHARM REV CODE 636 W HCPCS: Performed by: OBSTETRICS & GYNECOLOGY

## 2020-12-11 PROCEDURE — 25000003 PHARM REV CODE 250: Performed by: OBSTETRICS & GYNECOLOGY

## 2020-12-11 RX ORDER — IBUPROFEN 600 MG/1
600 TABLET ORAL EVERY 6 HOURS PRN
Qty: 40 TABLET | Refills: 0 | Status: SHIPPED | OUTPATIENT
Start: 2020-12-11 | End: 2021-01-19

## 2020-12-11 RX ADMIN — IBUPROFEN 600 MG: 600 TABLET, FILM COATED ORAL at 03:12

## 2020-12-11 RX ADMIN — INFLUENZA VIRUS VACCINE 0.5 ML: 15; 15; 15; 15 SUSPENSION INTRAMUSCULAR at 02:12

## 2020-12-11 NOTE — PROGRESS NOTES
"[facility]    Obstetrics & Gynecology  Progress Note      Patient Name:  Pilar Hernandez  MRN:  [id number]  Admission Date:  12/9/2020  Hospital Length of Stay:  2  Attending Physician:  Julio Cesar Elias MD  Primary Care Provider:  Dana Germain MD    Subjective:     Date:  12/11/2020    Principal Problem:  39 weeks gestation of pregnancy    Hospital Course:  Post Partum Day # 1 - s/p vaginal delivery at 39w3d    Patient without major complaints  No acute events overnight  Denies headaches, vision changes, epigastric pain, SOB, CP  Pain well controlled  Lochia less than menses  Bottle/breast feeding   Breast non-tender, non-engorged  Ambulating, tolerating regular diet, and voiding without diffculty  Bonding well with baby    Objective:     Temp:  [96.7 °F (35.9 °C)-98.7 °F (37.1 °C)] 97.4 °F (36.3 °C)  Pulse:  [] 73  Resp:  [17-20] 17  SpO2:  [87 %-100 %] 97 %  BP: (113-143)/(72-85) 134/83    /83 (BP Location: Left arm, Patient Position: Lying)   Pulse 73   Temp 97.4 °F (36.3 °C) (Oral)   Resp 17   Ht 5' 7" (1.702 m)   Wt 95.3 kg (210 lb)   LMP 03/13/2020   SpO2 97%   Breastfeeding No   BMI 32.89 kg/m²     Intake/Output Summary (Last 24 hours) at 12/11/2020 1202  Last data filed at 12/10/2020 1735  Gross per 24 hour   Intake --   Output 858 ml   Net -858 ml   Clear, helio urine    Physical Exam:   Constitutional: She appears well-developed.                             Alert and oriented x 3.   HEENT:  No scleral icterus. Neck supple. Moist mucus membranes.   LUNGS:  Clear to auscultation bilaterally.   HEART:  Regular rate and rhythm with physiologic heart sounds.   ABDOMEN:  Soft, non-tender, non-distended, no guarding, rigidity, or rebound with normoactive bowel sounds.   FUNDUS:  Firm, nontender below the umbilicus.   EXTREMITIES:  No cramping, claudication.  Trace edema LE. +2 distal pulses. Symmetric, full range of motion, negative Gilbert.  NEUROLOGIC:  " Grossly intact bilaterally. +2 DTR's.   PSYCH:  Mood and affect appropriate.   PERINEUM:  Intact with light lochia.    Labs:      Recent Results (from the past 336 hour(s))   CBC auto differential    Collection Time: 12/11/20  5:59 AM   Result Value Ref Range    WBC 8.75 3.90 - 12.70 K/uL    Hemoglobin 9.4 (L) 12.0 - 16.0 g/dL    Hematocrit 29.3 (L) 37.0 - 48.5 %    Platelets 158 150 - 350 K/uL   CBC with Auto Differential    Collection Time: 12/09/20  1:30 PM   Result Value Ref Range    WBC 7.29 3.90 - 12.70 K/uL    Hemoglobin 10.0 (L) 12.0 - 16.0 g/dL    Hematocrit 30.4 (L) 37.0 - 48.5 %    Platelets 170 150 - 350 K/uL     ABO:  O POS    Assessment:     1. Post-partum day # 1 - IUP at 39w3d s/p spontaneous vaginal delivery - progressing well  2. Gestational hypertension  3. Anemia, iron deficiency      Plan:     Plan for discharge today.     Iron supplementation, anemia precautions    Monitor blood pressure at home TID and call office if SBP > 150 and/or DBP > 100.  Hypertensive, pre-eclampsia precautions reviewed.    Reviewed discharge medications.  Pt was instructed to avoid driving or operate heavy machinery while taking narcotics or other medications with sedative properties.    Post-partum care instructions and precautions, clinical/delivery findings, and hospital course reviewed with pt prior to discharge.  All of her questions were answered to her satisfaction.  Pt voiced understanding and agrees with discharge.    Return to clinic in 1 week for post-partum visit/BP check or sooner if any concerns.  Go to ER for any emergencies.    Disposition:  As patient meets milestones, will plan to discharge.      Julio Cesar Elias MD

## 2020-12-11 NOTE — PLAN OF CARE
Patient progressing well, verbalizes readiness for discharge. IV removed per RN on. Pain well controlled with PO medication PRN. Has passed gas. Bonding well with infant. Bottle feeding q 3-4 hrs. Discharge instructions reviewed with patient, verbalizes understanding.

## 2020-12-11 NOTE — PLAN OF CARE
VSS, Formula feeding per request. Supportive bra encouraged. Fundus and lochia WDL. Voiding, passing flatus, ambulating and tolerating regular diet. Bonding well with baby. Denied need for pain medication.  Stated an understanding to POC.  Requesting to be discharged with baby on 12- to be home with other children. AM labs to be drawn.       Problem: Adult Inpatient Plan of Care  Goal: Plan of Care Review  Outcome: Ongoing, Progressing  Goal: Patient-Specific Goal (Individualization)  Outcome: Ongoing, Progressing  Flowsheets (Taken 12/11/2020 0042)  Individualized Care Needs: Formula feed, pain managed, bond/roomin, discharge home with baby on 12-  Anxieties, Fears or Concerns: discharge with baby on 12-  Patient-Specific Goals (Include Timeframe): Formula feed per preference, assiatnce as needed, pain medication as needed, bond/room in, discharge with baby on 12-  Goal: Absence of Hospital-Acquired Illness or Injury  Outcome: Ongoing, Progressing  Goal: Optimal Comfort and Wellbeing  Outcome: Ongoing, Progressing  Goal: Readiness for Transition of Care  Outcome: Ongoing, Progressing  Goal: Rounds/Family Conference  Outcome: Ongoing, Progressing     Problem: Infection  Goal: Infection Symptom Resolution  Outcome: Ongoing, Progressing     Problem: Adjustment to Role Transition (Postpartum Vaginal Delivery)  Goal: Successful Maternal Role Transition  Outcome: Ongoing, Progressing     Problem: Bleeding (Postpartum Vaginal Delivery)  Goal: Hemostasis  Outcome: Ongoing, Progressing     Problem: Infection (Postpartum Vaginal Delivery)  Goal: Absence of Infection Signs/Symptoms  Outcome: Ongoing, Progressing     Problem: Pain (Postpartum Vaginal Delivery)  Goal: Acceptable Pain Control  Outcome: Ongoing, Progressing     Problem: Urinary Retention (Postpartum Vaginal Delivery)  Goal: Effective Urinary Elimination  Outcome: Ongoing, Progressing

## 2020-12-11 NOTE — DISCHARGE SUMMARY
[facility]    Discharge Summary  Obstetrics & Gynecology      Patient Name:  Pilar Hernandez  MRN:  [id number]  Admission Date:  2020  Discharge Date:   2020  Hospital Length of Stay:  2  Attending Physician:  Julio Cesar Elias MD  Discharging Physician:  Julio Cesar Elias MD  Primary Care Provider:  Dana Germain MD  Date of Delivery:  12/10/2020    Admitting Diagnosis:       [place] gestation at 39w3d  Induction of labor secondary to gestational hypertension  Anemia, iron deficiency      Discharge Diagnosis:    [place] gestation at term s/p spontaneous vaginal delivery   Gestational hypertension  Anemia, iron deficiency      Procedure performed:      Vaginal delivery    Brief Hospital Course:      See chart for complete details.  In brief, this is a 35 y.o.  at 39w3d admitted for induction of labor secondary to gestational hypertension.  The induction was initiated with cervidil x 1 dose followed by pitocin.  Pt progressed well through the active phase of labor and delivered a viable infant.  Maternal and fetal status was overall reassuring throughout the labor course.  SROM at 12/10/2020 ~7AM with moderate, clear fluid, no odor and had no intrapartum fever.  BP has been in the normal to mild range intrapartum and did not require treatment, asymptomatic.  Pt was informed of the risks, benefits, alternatives and possible complications to a vaginal delivery.  See delivery note for further details.  Following delivery, pt was transferred to mother baby unit for routine post-partum care.  Pt had a fairly uncomplicated postpartum course.  Prior to discharge, she was without major complaints.  Pt pain was well controlled.  Pt was ambulating, tolerating a regular diet, voiding without difficulty, and bonding well with baby.  Lochia was light.  Vital signs where physiologic and stable.  Physical exam showed no acute findings.  Pt had satisfied routine postpartum discharge  "criteria and expressed the desire to be discharge from the hospital.     Pertinent Labs or Studies:      Recent Results (from the past 336 hour(s))   CBC auto differential    Collection Time: 12/11/20  5:59 AM   Result Value Ref Range    WBC 8.75 3.90 - 12.70 K/uL    Hemoglobin 9.4 (L) 12.0 - 16.0 g/dL    Hematocrit 29.3 (L) 37.0 - 48.5 %    Platelets 158 150 - 350 K/uL   CBC with Auto Differential    Collection Time: 12/09/20  1:30 PM   Result Value Ref Range    WBC 7.29 3.90 - 12.70 K/uL    Hemoglobin 10.0 (L) 12.0 - 16.0 g/dL    Hematocrit 30.4 (L) 37.0 - 48.5 %    Platelets 170 150 - 350 K/uL     ABO:  O POS    Discharge Exam:      Temp:  [96.7 °F (35.9 °C)-98.7 °F (37.1 °C)] 97.4 °F (36.3 °C)  Pulse:  [] 73  Resp:  [17-20] 17  SpO2:  [87 %-100 %] 97 %  BP: (113-143)/(72-85) 134/83    /83 (BP Location: Left arm, Patient Position: Lying)   Pulse 73   Temp 97.4 °F (36.3 °C) (Oral)   Resp 17   Ht 5' 7" (1.702 m)   Wt 95.3 kg (210 lb)   LMP 03/13/2020   SpO2 97%   Breastfeeding No   BMI 32.89 kg/m²     GENERAL:  No acute distress. Alert and oriented x 3.   HEENT:  Normocephalic. EOMI, PERRLA. No scleral icterus. Neck supple. Moist mucus membranes.   LUNGS:  Clear to auscultation bilaterally.   HEART:  Regular rate and rhythm with physiologic heart sounds.   ABDOMEN:  Soft, non-tender, non-distended, no guarding, rigidity, or rebound.   FUNDUS:  Firm, nontender below the umbilicus.   EXTREMITIES:  No cramping, claudication.  Trace edema. Good skin turgor. +2 distal pulses, symmetric. Full range of motion.   NEUROLOGIC:  Grossly intact bilaterally.   PSYCH:  Mood and affect appropriate.   PERINEUM:  Intact with light lochia.    Discharge Condition:  Stable      Discharge Disposition:  Home       Discharge Medications:     Resume prenatal vitamins 1 tab po qday  Fergon 325 mg 1 tab po bid  Colace 100 mg 1 tab po bid prn constipation  Motrin 600 mg 1 tab po q6h prn pain    Discharge Activites:  "     Resume activities as tolerated with adequate rest  Pelvic rest for 6 weeks   No heavy lifting greater 10 lbs or strenuous activities   Showers only  Wound care instructions and precautions given   Do NOT driving and operating machinery while taking narcotics or other sedative medications, pt voiced understanding.    Discharge Diet:  Low, salt, regular diet    Discharge Instructions:      Pt was instructed to contact the clinic or emergency department for any concerns including but not limited to an increase in her lochia, abdominal pain, foul vaginal discharge, weakness, decrease activity level, decrease appetite, feeling sad or hopeless, inability to care for her baby, breast pain or infection, difficulty with voiding or having bowel movements, perineal pain or breakdown, wound breakdown, fever >100.4 or abnormal vital signs, shortness of breath, chest pain, dizziness or feeling faint, pain or swelling in her extremities, headaches not relieved with rest and Tylenol, vision changes, seizure activities, abnormal bleeding/bruising, or any other health concerns.  Post-partum care instructions and precautions, labs/studies, clinical/delivery findings, and hospital course reviewed with the patient prior to discharge.  All of her questions were answered to her satisfaction.  Pt voiced understanding.  Keep blood pressure log at home and call office if sbp >150 or dbp >100.  Hypertensive, pre-eclampsia given.      Follow-up Visit:  1 weeks for postpartum visit/BP check, or sooner if any problems.       Julio Cesar Elias MD

## 2020-12-11 NOTE — DISCHARGE INSTRUCTIONS
After a Vaginal Birth    General Discharge Instructions    · May follow a regular diet, unless otherwise discussed with physician.    · Take showers, not baths unless otherwise discussed with physician.    · Activity as tolerated.    · No lifting or heavy exercise for 6 weeks, no driving for 2 weeks, no sexual intercourse, douching or tampons for 6 weeks    · May return to work/school as discussed with physician  · Take medications as directed    · Discuss birth control with physician    · Breast care support bra worn at all times    · Lactation consultant referral number ( [phone] or [phone])    Call Your Healthcare Provider Right Away If You Have:  · A temperature of 100.4°F or higher.  · If your blood pressure is over 155/105.  · You have difficulty catching your breath or trouble breathing.  · Heavy vaginal bleeding, clots, or vaginal discharge with foul odor. (heavier than menses)  · Persistent nausea or vomiting.  · You gain more than 3 pounds in 3 days.  · Severe headaches not relieved by Tylenol (acetaminophen) or Motrin (ibuprofen)  · Blurry or double vision, see spots or flashing lights.  · Dizziness or fainting.  · New onset swelling or worsening of existing swelling.  · Burning or pain when you urinate.  · No bowel movement for 5 days.  · Redness, warmth, swelling, or pain in the lower leg.  · Redness, discharge, or pain worse than you had in the hospital.  · Burning, pain, red streaks, or lumpy areas in your breasts.  · Cracks, blisters, or blood on your nipples.  · Feelings of extreme sadness or anxiety, or a feeling that you dont want to be with your baby.  · If you have any new or unusual symptoms or have questions or concerns    Some of these symptoms can occur up to 4 to 6 weeks after delivery. This can be a sign of pre-eclampsia, which is a serious disease that can cause stroke, seizures, organ damage, or death. Do not wait to call your doctor or seek medical attention.        Taking  Your Blood Pressure  Blood pressure is the force of blood against the artery wall as it moves from the heart through the blood vessels. You can take your own blood pressure reading using a digital monitor. Take your readings the same each time, using the same arm. Take readings as often as your healthcare provider instructs.  About blood pressure monitors  Blood pressure monitors are designed for certain ages and cases. You can find monitors for older adults, for pregnant women, and for children. Make sure the one you choose is the right one for your age and situation.  The American Heart Association recommends an automatic cuff monitor that fits on your upper arm (bicep). The cuff should fit your arm size. A cuff thats too large or too small will not give an accurate reading. Measure around your upper arm to find your size.  Monitors that attach to your finger or wrist are not as accurate as monitors for your upper arm.  Ask your healthcare provider for help in choosing a monitor. Bring your monitor to your next provider visit if you need help in using it the correct way.  The steps below are general instructions for using an automatic digital monitor.  Step 1. Relax    · Take your blood pressure at the same time every day, such as in the morning or evening, or at the time your healthcare provider recommends.  · Wait at least a half-hour after smoking, eating, or exercising. Don't drink coffee, tea, soda, or other caffeinated beverages before checking your blood pressure.  · Sit comfortably at a table with both feet on the floor. Do not cross your legs or feet. Place the monitor near you.  · Rest for a few minutes before you begin.  Step 2. Wrap the cuff    · Place your arm on the table, palm up. Your arm should be at the level of your heart. Wrap the cuff around your upper arm, just above your elbow. Its best done on bare skin, not over clothing. Most cuffs will indicate where the brachial artery (the blood  vessel in the middle of the arm at the inner side of the elbow) should line up with the cuff. Look in your monitor's instruction booklet for an illustration. You can also bring your cuff to your healthcare provider and have them show you how to correctly place the cuff.  Step 3. Inflate the cuff    · Push the button that starts the pump.  · The cuff will tighten, then loosen.  · The numbers will change. When they stop changing, your blood pressure reading will appear.  · Take 2 or 3 readings one minute apart.  Step 4. Write down the results of each reading    · Write down your blood pressure numbers for each reading. Note the date and time. Keep your results in one place, such as a notebook. Even if your monitor has a built-in memory, keep a hard copy of the readings.  · Remove the cuff from your arm. Turn off the machine.  · Bring your blood pressure records with your healthcare providers at each visit.  · If you start a new blood pressure medicine, note the day you started the new medicine. Also note the day if you change the dose of your medicine. This information goes on your blood pressure recording sheet. This will help your healthcare provider monitor how well the medicine changes are working.  · Ask your healthcare provider what numbers should prompt you to call him or her. Also ask what numbers should prompt you to get help right away.  Date Last Reviewed: 11/1/2016  © 1220-9029 [employer]. [address] [place] [id number]. All rights reserved. This information is not intended as a substitute for professional medical care. Always follow your healthcare professional's instructions.    _____________________________________________________________________      Understanding Preeclampsia  Preeclampsia is pregnancy-related hypertension that develops after 20 weeks' gestation. It can lead to health risks for you and your baby. No one knows what causes preeclampsia. But it is known that  the only cure is delivery.     Your blood pressure will be monitored regularly throughout your pregnancy to help check for preeclampsia.   Signs and symptoms  A common sign of preeclampsia is high blood pressure. Other signs and symptoms may include:  · Rapid weight gain  · Protein in your urine  · Headache  · Abdominal pain on your right side  · Vision problems (flashes or spots)  · Edema (swelling) in your face or hands (this also commonly happens near the end of normal pregnancies, even without preeclampsia)  Tests you may have  Your healthcare provider will want to check your blood pressure throughout your pregnancy. If your blood pressure is high, you may have the following tests:  · Urine tests to look for protein  · Blood tests to confirm preeclampsia  · Fetal monitoring to ensure that your baby is healthy  Treating preeclampsia  A daily low dose of aspirin may be prescribed to those at risk for preeclampsia. Preeclampsia almost always ends soon after you give birth. Until then, your healthcare provider can help manage your condition. If your symptoms are mild, you may need bed rest at home. If your symptoms are severe, you will be hospitalized. Hospital treatment includes:  · Complete bed rest to help control blood pressure  · Magnesium IV (intravenous) drip during labor to prevent seizures  · Induced labor or surgical delivery by  section  When to call your healthcare provider  Call your healthcare provider if swelling, weight gain, or other symptoms come on quickly or are severe. Some cases of preeclampsia are more severe than others. Your signs and symptoms also may change or worsen as you get closer to your due date.  Whos at risk?  Preeclampsia can happen in any pregnant woman. Factors that increase the risk include:  · Previous pregnancies. Preeclampsia, intrauterine growth retardation (IUGR),  birth, placental abruption, or fetal death  · Medical history of mother. Diabetes, high blood  pressure, obesity, kidney disease, autoimmune disease (for example lupus), or family history of preeclampsia  · Current pregnancy. First pregnancy, multiple fetuses, over the age of 40 years, or in vitro fertilization  Dangers of preeclampsia  If not treated, preeclampsia can cause problems for you and your baby. The placenta (organ that nourishes your baby) may tear away from the uterine wall. This can lead to fetal distress (the baby is at risk for health problems) and premature delivery. Preeclampsia can also cause these health problems:  · Kidney failure or other organ damage  · Seizures  · Stroke  Once you give birth  In most cases, preeclampsia goes away on its own soon after you give birth. Within days of delivery, your blood pressure, swelling, and other signs should decrease.  Date Last Reviewed: 6/1/2016  © 5940-7880 [employer]. [address] [place] [address], [place] [address]. All rights reserved. This information is not intended as a substitute for professional medical care. Always follow your healthcare professional's instructions.

## 2020-12-11 NOTE — LACTATION NOTE
This note was copied from a baby's chart.  Safe formula feeding handout given and reviewed.  Discussed proper hand washing, expiration time of formula, position of baby, position of nipple and bottle while feeding, baby led feeding and fullness cues.  Pt verbalized understanding and verbalized appropriate recall.Instructed on safe formula feeding, preparation and transporting of pre-mixed feedings.  Including:   Use of thoroughly cleaned and sterilized BPA free bottles   Formula & water preference to be determined by the advice of the pediatrician   Proper hand washing   Follow all s guidelines for preparing formula   Check expiration dates   Clean all can tops with soap and water prior to opening; also use a clean can opener   Mixed formula can be stored in the refrigerator for up to 24 hours according to the World Health Organization   Never microwave bottles   Correct position of baby, nipple in the mouth and bottle position   Infant led feeding   Formula expires 1 hour after initiation of the feeding   All mixed formula should be refrigerated until immediately prior to transport   Transport in a cool insulated bag with ice packs and use within 2 hours or re-refrigerate at arrival destination   Re-warm feeding at the destination for no longer than 15 minutes  Formula feeding guide given and reviewed.  Pt verbalized understanding and provided appropriate recall.

## 2020-12-13 ENCOUNTER — PATIENT MESSAGE (OUTPATIENT)
Dept: OBSTETRICS AND GYNECOLOGY | Facility: CLINIC | Age: 35
End: 2020-12-13

## 2020-12-13 DIAGNOSIS — O13.3 GESTATIONAL HYPERTENSION, THIRD TRIMESTER: Primary | ICD-10-CM

## 2020-12-13 RX ORDER — NIFEDIPINE 30 MG/1
30 TABLET, EXTENDED RELEASE ORAL DAILY
Qty: 30 TABLET | Refills: 1 | Status: SHIPPED | OUTPATIENT
Start: 2020-12-13 | End: 2020-12-18 | Stop reason: SDUPTHER

## 2020-12-14 NOTE — ANESTHESIA POSTPROCEDURE EVALUATION
Anesthesia Post Evaluation    Patient: Pilar Hernandez    Procedure(s) Performed: * No procedures listed *    Final Anesthesia Type: epidural    Patient location during evaluation: labor & delivery  Patient participation: Yes- Able to Participate  Level of consciousness: awake and alert  Post-procedure vital signs: reviewed and stable  Pain management: adequate  Airway patency: patent    PONV status at discharge: No PONV  Anesthetic complications: no      Cardiovascular status: blood pressure returned to baseline and hemodynamically stable  Respiratory status: unassisted and spontaneous ventilation  Hydration status: euvolemic  Follow-up not needed.          Vitals Value Taken Time   /83 12/11/20 1138   Temp 36.3 °C (97.4 °F) 12/11/20 1138   Pulse 73 12/11/20 1138   Resp 17 12/11/20 1138   SpO2 97 % 12/11/20 1138         No case tracking events are documented in the log.      Pain/Tash Score: No data recorded

## 2020-12-18 ENCOUNTER — OFFICE VISIT (OUTPATIENT)
Dept: OBSTETRICS AND GYNECOLOGY | Facility: CLINIC | Age: 35
End: 2020-12-18
Payer: MEDICAID

## 2020-12-18 VITALS
SYSTOLIC BLOOD PRESSURE: 138 MMHG | WEIGHT: 200.31 LBS | BODY MASS INDEX: 31.37 KG/M2 | DIASTOLIC BLOOD PRESSURE: 94 MMHG

## 2020-12-18 DIAGNOSIS — O13.3 GESTATIONAL HYPERTENSION, THIRD TRIMESTER: ICD-10-CM

## 2020-12-18 PROCEDURE — 99213 OFFICE O/P EST LOW 20 MIN: CPT | Mod: PBBFAC,TH | Performed by: OBSTETRICS & GYNECOLOGY

## 2020-12-18 PROCEDURE — 99999 PR PBB SHADOW E&M-EST. PATIENT-LVL III: ICD-10-PCS | Mod: PBBFAC,,, | Performed by: OBSTETRICS & GYNECOLOGY

## 2020-12-18 PROCEDURE — 99999 PR PBB SHADOW E&M-EST. PATIENT-LVL III: CPT | Mod: PBBFAC,,, | Performed by: OBSTETRICS & GYNECOLOGY

## 2020-12-18 PROCEDURE — 59430 PR CARE AFTER DELIVERY ONLY: ICD-10-PCS | Mod: ,,, | Performed by: OBSTETRICS & GYNECOLOGY

## 2020-12-18 RX ORDER — NIFEDIPINE 30 MG/1
30 TABLET, EXTENDED RELEASE ORAL DAILY
Qty: 60 TABLET | Refills: 1 | Status: SHIPPED | OUTPATIENT
Start: 2020-12-18 | End: 2021-01-04 | Stop reason: SDUPTHER

## 2020-12-18 NOTE — PROGRESS NOTES
[facility]  Ambulatory Clinic  Obstetrics & Gynecology    Date of Visit:  2020    Chief Complaint:  Post-partum visit    Subjective:      Pilar Hernandez is a 35 y.o.  who is s/p spontaneous vaginal delivery at term here for post-partum visit/BP check.  Pregnancy complicated by gestational hypertension shortly prior to delivery.  Pt was started on procardia 30 mg XL qd after discharge which was increased to 60 mg qd.  Pt denies headaches, vision changes, epigastric pain, or acute swelling.  Otherwise, Pt has no major complaints today and reports doing well since delivery.  Pt reports baby is doing well and she is breast/bottle feeding without difficulty.  Her lochia scant.  Pt denies abdominal/pelvic pain, fevers, abnormal vaginal discharge, symptoms of post-partum blues or depression, breast complaints, GI or urinary compliants.  Pt is undecided on birth control at this time.    Review of Systems:      CONSTITUTIONAL:  No fever, chills, weakness, fatigue, decreased activity  HEENT: No headaches, vision changes  LUNGS:  No shortness of breath  HEART:  No palpitations, chest pain, abnormal swelling  BREAST:  No lump, pain, redness, skin changes  GI:  No nausea, vomiting, diarrhea, constipation, abdomen pain, blood in stool  URINARY:  No dysuria, hematuria, frequency  SKIN:  No rash, bruising  NEURO:  No headache, weakness  PSYCH:  No anxiety, depression, suicidal or homicidal ideations    Objective:     BP (!) 138/94   Wt 90.9 kg (200 lb 4.6 oz)   Breastfeeding No   BMI 31.37 kg/m²   Pulse 70, Resp rate 16     GENERAL:  NAD, A&Ox3, well nourished.  HEENT:  NCAT,moist mucus membranes, neck supple.  BREAST:  Pt deferred today.    LUNGS:  CTA-B.  HEART:  RRR with physiologic heart sounds.  ABDOMEN:  Soft, non-tender, non-distended without any guarding, rigidity or rebound. Normoactive bowel sounds.  Fundus firm, non-tender, below umbilicus.  EXT:  Symmetric. No  cramping, claudication, or edema. +2 distal pulses. FROM.  SKIN:  No rashes, good turgor & capillary refill.  NEURO:  Grossly intact bilaterally. +2 DTR.  PSYCH:  Mood & affect appropriate.     PELVIC:  Pt deferred today.      Chaperone present for exam.    Assessment:     1. 35 y.o.  s/p  at term - doing well post-partum  2. Gestational hypertension  3. Anemia, iron deficiency      Plan:    Post-partum care instructions and precautions reviewed.  Pelvic rest x 6 wks post-partum.  Continue prenatal vitamins, fergon and colace.       Continue procardia 60 mg XL qd.  Monitor blood pressure at home TID and call office if SBP > 150 and/or DBP > 100.  Hypertensive, pre-eclampsia precautions reviewed.    Iron supplementation, anemia precautions.    Encourage healthy lifestyle modifications.    Return 4 wks for post-partum check, or sooner as needed.  Pt voiced understanding.       Julio Cesar Elias MD

## 2021-01-04 ENCOUNTER — PATIENT MESSAGE (OUTPATIENT)
Dept: OBSTETRICS AND GYNECOLOGY | Facility: CLINIC | Age: 36
End: 2021-01-04

## 2021-01-04 DIAGNOSIS — O13.3 GESTATIONAL HYPERTENSION, THIRD TRIMESTER: ICD-10-CM

## 2021-01-04 RX ORDER — NIFEDIPINE 30 MG/1
30 TABLET, EXTENDED RELEASE ORAL 2 TIMES DAILY
Qty: 60 TABLET | Refills: 1 | Status: SHIPPED | OUTPATIENT
Start: 2021-01-04 | End: 2021-03-23 | Stop reason: SDUPTHER

## 2021-01-19 ENCOUNTER — POSTPARTUM VISIT (OUTPATIENT)
Dept: OBSTETRICS AND GYNECOLOGY | Facility: CLINIC | Age: 36
End: 2021-01-19
Payer: MEDICAID

## 2021-01-19 VITALS
WEIGHT: 202.38 LBS | SYSTOLIC BLOOD PRESSURE: 132 MMHG | BODY MASS INDEX: 31.76 KG/M2 | DIASTOLIC BLOOD PRESSURE: 86 MMHG | HEIGHT: 67 IN

## 2021-01-19 DIAGNOSIS — Z30.09 FAMILY PLANNING: ICD-10-CM

## 2021-01-19 PROCEDURE — 99499 UNLISTED E&M SERVICE: CPT | Mod: S$PBB,,, | Performed by: OBSTETRICS & GYNECOLOGY

## 2021-01-19 PROCEDURE — 99999 PR PBB SHADOW E&M-EST. PATIENT-LVL III: CPT | Mod: PBBFAC,,, | Performed by: OBSTETRICS & GYNECOLOGY

## 2021-01-19 PROCEDURE — 99499 NO LOS: ICD-10-PCS | Mod: S$PBB,,, | Performed by: OBSTETRICS & GYNECOLOGY

## 2021-01-19 PROCEDURE — 99213 OFFICE O/P EST LOW 20 MIN: CPT | Mod: PBBFAC | Performed by: OBSTETRICS & GYNECOLOGY

## 2021-01-19 PROCEDURE — 99999 PR PBB SHADOW E&M-EST. PATIENT-LVL III: ICD-10-PCS | Mod: PBBFAC,,, | Performed by: OBSTETRICS & GYNECOLOGY

## 2021-01-19 RX ORDER — NORELGESTROMIN AND ETHINYL ESTRADIOL 35; 150 UG/MG; UG/MG
1 PATCH TRANSDERMAL WEEKLY
Qty: 9 PATCH | Refills: 3 | Status: SHIPPED | OUTPATIENT
Start: 2021-01-19 | End: 2022-01-05

## 2021-01-28 ENCOUNTER — PATIENT MESSAGE (OUTPATIENT)
Dept: OBSTETRICS AND GYNECOLOGY | Facility: CLINIC | Age: 36
End: 2021-01-28

## 2021-03-09 ENCOUNTER — PATIENT OUTREACH (OUTPATIENT)
Dept: ADMINISTRATIVE | Facility: HOSPITAL | Age: 36
End: 2021-03-09

## 2021-03-15 PROBLEM — O13.3 GESTATIONAL HYPERTENSION, THIRD TRIMESTER: Status: RESOLVED | Noted: 2020-12-11 | Resolved: 2021-03-15

## 2021-03-23 ENCOUNTER — OFFICE VISIT (OUTPATIENT)
Dept: FAMILY MEDICINE | Facility: CLINIC | Age: 36
End: 2021-03-23
Payer: MEDICAID

## 2021-03-23 VITALS
DIASTOLIC BLOOD PRESSURE: 82 MMHG | HEART RATE: 74 BPM | TEMPERATURE: 99 F | WEIGHT: 201.5 LBS | HEIGHT: 67 IN | OXYGEN SATURATION: 99 % | BODY MASS INDEX: 31.63 KG/M2 | SYSTOLIC BLOOD PRESSURE: 124 MMHG

## 2021-03-23 DIAGNOSIS — B37.2 CANDIDAL INTERTRIGO: ICD-10-CM

## 2021-03-23 DIAGNOSIS — Z00.00 ANNUAL PHYSICAL EXAM: Primary | ICD-10-CM

## 2021-03-23 DIAGNOSIS — I10 HYPERTENSION, ESSENTIAL, BENIGN: ICD-10-CM

## 2021-03-23 DIAGNOSIS — D50.9 IRON DEFICIENCY ANEMIA, UNSPECIFIED IRON DEFICIENCY ANEMIA TYPE: ICD-10-CM

## 2021-03-23 PROCEDURE — 99999 PR PBB SHADOW E&M-EST. PATIENT-LVL III: ICD-10-PCS | Mod: PBBFAC,,, | Performed by: INTERNAL MEDICINE

## 2021-03-23 PROCEDURE — 99999 PR PBB SHADOW E&M-EST. PATIENT-LVL III: CPT | Mod: PBBFAC,,, | Performed by: INTERNAL MEDICINE

## 2021-03-23 PROCEDURE — 99395 PR PREVENTIVE VISIT,EST,18-39: ICD-10-PCS | Mod: S$PBB,,, | Performed by: INTERNAL MEDICINE

## 2021-03-23 PROCEDURE — 99213 OFFICE O/P EST LOW 20 MIN: CPT | Mod: PBBFAC,PO | Performed by: INTERNAL MEDICINE

## 2021-03-23 PROCEDURE — 99395 PREV VISIT EST AGE 18-39: CPT | Mod: S$PBB,,, | Performed by: INTERNAL MEDICINE

## 2021-03-23 RX ORDER — NYSTATIN 100000 U/G
OINTMENT TOPICAL 2 TIMES DAILY
Qty: 30 G | Refills: 2 | Status: SHIPPED | OUTPATIENT
Start: 2021-03-23 | End: 2022-06-29

## 2021-03-23 RX ORDER — NIFEDIPINE 30 MG/1
30 TABLET, EXTENDED RELEASE ORAL NIGHTLY
Qty: 90 TABLET | Refills: 1 | Status: SHIPPED | OUTPATIENT
Start: 2021-03-23 | End: 2021-09-27

## 2021-03-23 RX ORDER — COVID-19 MOLECULAR TEST ASSAY
KIT MISCELLANEOUS
COMMUNITY
Start: 2021-02-09 | End: 2021-03-23

## 2021-03-25 ENCOUNTER — LAB VISIT (OUTPATIENT)
Dept: LAB | Facility: HOSPITAL | Age: 36
End: 2021-03-25
Attending: INTERNAL MEDICINE
Payer: MEDICAID

## 2021-03-25 DIAGNOSIS — Z00.00 ANNUAL PHYSICAL EXAM: ICD-10-CM

## 2021-03-25 DIAGNOSIS — D50.9 IRON DEFICIENCY ANEMIA, UNSPECIFIED IRON DEFICIENCY ANEMIA TYPE: ICD-10-CM

## 2021-03-25 LAB
ALBUMIN SERPL BCP-MCNC: 3.8 G/DL (ref 3.5–5.2)
ALP SERPL-CCNC: 54 U/L (ref 55–135)
ALT SERPL W/O P-5'-P-CCNC: 9 U/L (ref 10–44)
ANION GAP SERPL CALC-SCNC: 9 MMOL/L (ref 8–16)
AST SERPL-CCNC: 12 U/L (ref 10–40)
BASOPHILS # BLD AUTO: 0.04 K/UL (ref 0–0.2)
BASOPHILS NFR BLD: 0.9 % (ref 0–1.9)
BILIRUB SERPL-MCNC: 0.4 MG/DL (ref 0.1–1)
BUN SERPL-MCNC: 8 MG/DL (ref 6–20)
CALCIUM SERPL-MCNC: 8.6 MG/DL (ref 8.7–10.5)
CHLORIDE SERPL-SCNC: 104 MMOL/L (ref 95–110)
CHOLEST SERPL-MCNC: 157 MG/DL (ref 120–199)
CHOLEST/HDLC SERPL: 2.6 {RATIO} (ref 2–5)
CO2 SERPL-SCNC: 27 MMOL/L (ref 23–29)
CREAT SERPL-MCNC: 0.8 MG/DL (ref 0.5–1.4)
DIFFERENTIAL METHOD: ABNORMAL
EOSINOPHIL # BLD AUTO: 0.1 K/UL (ref 0–0.5)
EOSINOPHIL NFR BLD: 1.8 % (ref 0–8)
ERYTHROCYTE [DISTWIDTH] IN BLOOD BY AUTOMATED COUNT: 14.5 % (ref 11.5–14.5)
EST. GFR  (AFRICAN AMERICAN): >60 ML/MIN/1.73 M^2
EST. GFR  (NON AFRICAN AMERICAN): >60 ML/MIN/1.73 M^2
ESTIMATED AVG GLUCOSE: 94 MG/DL (ref 68–131)
FERRITIN SERPL-MCNC: 23 NG/ML (ref 20–300)
GLUCOSE SERPL-MCNC: 83 MG/DL (ref 70–110)
HBA1C MFR BLD: 4.9 % (ref 4–5.6)
HCT VFR BLD AUTO: 37.2 % (ref 37–48.5)
HDLC SERPL-MCNC: 60 MG/DL (ref 40–75)
HDLC SERPL: 38.2 % (ref 20–50)
HGB BLD-MCNC: 11.7 G/DL (ref 12–16)
IMM GRANULOCYTES # BLD AUTO: 0 K/UL (ref 0–0.04)
IMM GRANULOCYTES NFR BLD AUTO: 0 % (ref 0–0.5)
IRON SERPL-MCNC: 101 UG/DL (ref 30–160)
LDLC SERPL CALC-MCNC: 76 MG/DL (ref 63–159)
LYMPHOCYTES # BLD AUTO: 1.8 K/UL (ref 1–4.8)
LYMPHOCYTES NFR BLD: 39.6 % (ref 18–48)
MCH RBC QN AUTO: 26.7 PG (ref 27–31)
MCHC RBC AUTO-ENTMCNC: 31.5 G/DL (ref 32–36)
MCV RBC AUTO: 85 FL (ref 82–98)
MONOCYTES # BLD AUTO: 0.2 K/UL (ref 0.3–1)
MONOCYTES NFR BLD: 5.3 % (ref 4–15)
NEUTROPHILS # BLD AUTO: 2.4 K/UL (ref 1.8–7.7)
NEUTROPHILS NFR BLD: 52.4 % (ref 38–73)
NONHDLC SERPL-MCNC: 97 MG/DL
NRBC BLD-RTO: 0 /100 WBC
PLATELET # BLD AUTO: 209 K/UL (ref 150–350)
PLATELET BLD QL SMEAR: ABNORMAL
PMV BLD AUTO: 11.4 FL (ref 9.2–12.9)
POTASSIUM SERPL-SCNC: 3.6 MMOL/L (ref 3.5–5.1)
PROT SERPL-MCNC: 7.5 G/DL (ref 6–8.4)
RBC # BLD AUTO: 4.39 M/UL (ref 4–5.4)
SATURATED IRON: 24 % (ref 20–50)
SODIUM SERPL-SCNC: 140 MMOL/L (ref 136–145)
TOTAL IRON BINDING CAPACITY: 422 UG/DL (ref 250–450)
TRANSFERRIN SERPL-MCNC: 285 MG/DL (ref 200–375)
TRIGL SERPL-MCNC: 105 MG/DL (ref 30–150)
TSH SERPL DL<=0.005 MIU/L-ACNC: 2.46 UIU/ML (ref 0.4–4)
WBC # BLD AUTO: 4.52 K/UL (ref 3.9–12.7)

## 2021-03-25 PROCEDURE — 84443 ASSAY THYROID STIM HORMONE: CPT | Performed by: INTERNAL MEDICINE

## 2021-03-25 PROCEDURE — 36415 COLL VENOUS BLD VENIPUNCTURE: CPT | Mod: PO | Performed by: INTERNAL MEDICINE

## 2021-03-25 PROCEDURE — 82728 ASSAY OF FERRITIN: CPT | Performed by: INTERNAL MEDICINE

## 2021-03-25 PROCEDURE — 80053 COMPREHEN METABOLIC PANEL: CPT | Performed by: INTERNAL MEDICINE

## 2021-03-25 PROCEDURE — 85025 COMPLETE CBC W/AUTO DIFF WBC: CPT | Performed by: INTERNAL MEDICINE

## 2021-03-25 PROCEDURE — 83036 HEMOGLOBIN GLYCOSYLATED A1C: CPT | Performed by: INTERNAL MEDICINE

## 2021-03-25 PROCEDURE — 80061 LIPID PANEL: CPT | Performed by: INTERNAL MEDICINE

## 2021-03-25 PROCEDURE — 83540 ASSAY OF IRON: CPT | Performed by: INTERNAL MEDICINE

## 2021-04-19 ENCOUNTER — PATIENT MESSAGE (OUTPATIENT)
Dept: ADMINISTRATIVE | Facility: OTHER | Age: 36
End: 2021-04-19

## 2021-04-28 ENCOUNTER — PATIENT MESSAGE (OUTPATIENT)
Dept: RESEARCH | Facility: HOSPITAL | Age: 36
End: 2021-04-28

## 2021-05-04 ENCOUNTER — PATIENT MESSAGE (OUTPATIENT)
Dept: ADMINISTRATIVE | Facility: OTHER | Age: 36
End: 2021-05-04

## 2021-05-05 ENCOUNTER — PATIENT MESSAGE (OUTPATIENT)
Dept: ADMINISTRATIVE | Facility: OTHER | Age: 36
End: 2021-05-05

## 2021-11-05 ENCOUNTER — PATIENT MESSAGE (OUTPATIENT)
Dept: ADMINISTRATIVE | Facility: OTHER | Age: 36
End: 2021-11-05
Payer: MEDICAID

## 2022-01-07 ENCOUNTER — PATIENT OUTREACH (OUTPATIENT)
Dept: ADMINISTRATIVE | Facility: OTHER | Age: 37
End: 2022-01-07
Payer: MEDICAID

## 2022-01-10 ENCOUNTER — OFFICE VISIT (OUTPATIENT)
Dept: OBSTETRICS AND GYNECOLOGY | Facility: CLINIC | Age: 37
End: 2022-01-10
Payer: MEDICAID

## 2022-01-10 VITALS — BODY MASS INDEX: 31.7 KG/M2 | WEIGHT: 202.38 LBS | SYSTOLIC BLOOD PRESSURE: 120 MMHG | DIASTOLIC BLOOD PRESSURE: 80 MMHG

## 2022-01-10 DIAGNOSIS — N91.4 SECONDARY OLIGOMENORRHEA: ICD-10-CM

## 2022-01-10 DIAGNOSIS — Z32.01 POSITIVE PREGNANCY TEST: ICD-10-CM

## 2022-01-10 DIAGNOSIS — Z01.419 WELL WOMAN EXAM WITH ROUTINE GYNECOLOGICAL EXAM: Primary | ICD-10-CM

## 2022-01-10 PROCEDURE — 3074F SYST BP LT 130 MM HG: CPT | Mod: CPTII,,, | Performed by: OBSTETRICS & GYNECOLOGY

## 2022-01-10 PROCEDURE — 1159F PR MEDICATION LIST DOCUMENTED IN MEDICAL RECORD: ICD-10-PCS | Mod: CPTII,,, | Performed by: OBSTETRICS & GYNECOLOGY

## 2022-01-10 PROCEDURE — 99999 PR PBB SHADOW E&M-EST. PATIENT-LVL II: CPT | Mod: PBBFAC,,, | Performed by: OBSTETRICS & GYNECOLOGY

## 2022-01-10 PROCEDURE — 3079F DIAST BP 80-89 MM HG: CPT | Mod: CPTII,,, | Performed by: OBSTETRICS & GYNECOLOGY

## 2022-01-10 PROCEDURE — 99395 PREV VISIT EST AGE 18-39: CPT | Mod: S$PBB,,, | Performed by: OBSTETRICS & GYNECOLOGY

## 2022-01-10 PROCEDURE — 99395 PR PREVENTIVE VISIT,EST,18-39: ICD-10-PCS | Mod: S$PBB,,, | Performed by: OBSTETRICS & GYNECOLOGY

## 2022-01-10 PROCEDURE — 99999 PR PBB SHADOW E&M-EST. PATIENT-LVL II: ICD-10-PCS | Mod: PBBFAC,,, | Performed by: OBSTETRICS & GYNECOLOGY

## 2022-01-10 PROCEDURE — 3008F BODY MASS INDEX DOCD: CPT | Mod: CPTII,,, | Performed by: OBSTETRICS & GYNECOLOGY

## 2022-01-10 PROCEDURE — 1160F PR REVIEW ALL MEDS BY PRESCRIBER/CLIN PHARMACIST DOCUMENTED: ICD-10-PCS | Mod: CPTII,,, | Performed by: OBSTETRICS & GYNECOLOGY

## 2022-01-10 PROCEDURE — 3079F PR MOST RECENT DIASTOLIC BLOOD PRESSURE 80-89 MM HG: ICD-10-PCS | Mod: CPTII,,, | Performed by: OBSTETRICS & GYNECOLOGY

## 2022-01-10 PROCEDURE — 1159F MED LIST DOCD IN RCRD: CPT | Mod: CPTII,,, | Performed by: OBSTETRICS & GYNECOLOGY

## 2022-01-10 PROCEDURE — 1160F RVW MEDS BY RX/DR IN RCRD: CPT | Mod: CPTII,,, | Performed by: OBSTETRICS & GYNECOLOGY

## 2022-01-10 PROCEDURE — 3074F PR MOST RECENT SYSTOLIC BLOOD PRESSURE < 130 MM HG: ICD-10-PCS | Mod: CPTII,,, | Performed by: OBSTETRICS & GYNECOLOGY

## 2022-01-10 PROCEDURE — 3008F PR BODY MASS INDEX (BMI) DOCUMENTED: ICD-10-PCS | Mod: CPTII,,, | Performed by: OBSTETRICS & GYNECOLOGY

## 2022-01-10 PROCEDURE — 99212 OFFICE O/P EST SF 10 MIN: CPT | Mod: PBBFAC,TH | Performed by: OBSTETRICS & GYNECOLOGY

## 2022-01-10 NOTE — PROGRESS NOTES
[facility]  Ambulatory Clinic  Obstetrics & Gynecology    Visit Date:  1/10/2022     Chief Complaint:  Annual GYN exam, missed my period    History of Present Illness:      Pilar Hernandez is a 36 y.o.  here for annual GYN exam with c/o missed my period.    UPT positive today.    LMP Patient's last menstrual period was 2021 (exact date).      Pt has no major complaints today and denies any vaginal bleeding, discharge, pain, GI/ compliants.      Pt reports overall good health.    Review of Systems:      Constitutional:  No fever, fatigue  HENT:  No congestion, hearing changes  Eyes:  No visual disturbance  Respiratory:  No cough, shortness of breath  Cardiovascular:  No chest pain, leg swelling  Breast:  No lump, pain, nipple discharge, redness, skin changes  Gastrointestinal:  No abdominal pain, constipation, blood in stool   Genitourinary:  No dysuria, frequency  Endocrine:  No heat or cold intolerance  Musculoskeletal:  No back pain, arthralgias  Skin:  No rash, jaundice  Neurological:  No dizziness, weakness, headaches  Psychiatric/Behavioral:  No sleep disturbance, dysphoric mood     Physical Exam:     /80   Wt 91.8 kg (202 lb 6.1 oz)   LMP 2021 (Exact Date)   BMI 31.70 kg/m²   Pulse 60, Resp rate 16     GENERAL:  NAD. Well-nourished. A&Ox3.  HEENT:  NCAT, EOMI, moist mucus membranes.  Neck supple w/o masses.  BREAST:  Symmetric, no obvious masses, adenopathy, skin changes or nipple discharge.  LUNGS:  Clear normal respiratory effort  HEART:  RRR  ABDOMEN:  Soft, non-tender, non-distended.  EXT:  Symmetric w/o cramping, claudication, or edema. +2 distal pulses. FROM.  SKIN:  No rashes  NEURO:  Grossly intact bilaterally.   PSYCH:  Mood & affect appropriate.       PELVIC:  Female external genitalia w/o any obvious lesions.  Adequate perineal body. Normal urethral meatus. No gross lymphadenopathy.    Vagina:  Pink, moist, well-rugated.  Vaginal vault  with good support.  No obvious lesion.  No discharge noted.     Cervix:  No cervical motion tenderness, discharge, or obvious lesions.  Closed, thick, posterior.  Uterus:  Small, non-tender, normal contour.   Adnexa:  No masses or tenderness.    Rectal:  Declined.  No obvious external lesions.     Chaperone present for exam.    Assessment:     36 y.o. :    1. Well woman gynecologic exam  2. Late period, UPT positive, LMP 21, here for confirmation of pregnancy  3. Advanced maternal age    Plan:    A gynecologic health assessment was performed with age appropriate counseling.    We discussed principles of prenatal care, weight gain goals, dietary/lifestyle modifications, pregnancy care instructions and precautions.  Continue prenatal vitamins.  SAB and ectopic precautions reviewed.      Discussed implications of AMA on pregnancy.  Discussed aneuploidy testing as pregnancy progresses.      Return in 4 weeks for OB visit, or sooner prn.  All questions answered, pt voiced understanding.      Julio Cesar Elias MD

## 2022-01-14 ENCOUNTER — PATIENT MESSAGE (OUTPATIENT)
Dept: OBSTETRICS AND GYNECOLOGY | Facility: CLINIC | Age: 37
End: 2022-01-14
Payer: MEDICAID

## 2022-02-07 ENCOUNTER — LAB VISIT (OUTPATIENT)
Dept: LAB | Facility: HOSPITAL | Age: 37
End: 2022-02-07
Attending: OBSTETRICS & GYNECOLOGY
Payer: MEDICAID

## 2022-02-07 ENCOUNTER — INITIAL PRENATAL (OUTPATIENT)
Dept: OBSTETRICS AND GYNECOLOGY | Facility: CLINIC | Age: 37
End: 2022-02-07
Payer: MEDICAID

## 2022-02-07 VITALS
BODY MASS INDEX: 31.96 KG/M2 | WEIGHT: 204.06 LBS | DIASTOLIC BLOOD PRESSURE: 74 MMHG | SYSTOLIC BLOOD PRESSURE: 112 MMHG | HEART RATE: 72 BPM

## 2022-02-07 DIAGNOSIS — Z3A.09 9 WEEKS GESTATION OF PREGNANCY: ICD-10-CM

## 2022-02-07 DIAGNOSIS — Z11.3 SCREEN FOR STD (SEXUALLY TRANSMITTED DISEASE): ICD-10-CM

## 2022-02-07 DIAGNOSIS — O02.0 BLIGHTED OVUM: Primary | ICD-10-CM

## 2022-02-07 LAB
ABO + RH BLD: NORMAL
BASOPHILS # BLD AUTO: 0.03 K/UL (ref 0–0.2)
BASOPHILS NFR BLD: 0.8 % (ref 0–1.9)
BLD GP AB SCN CELLS X3 SERPL QL: NORMAL
DIFFERENTIAL METHOD: ABNORMAL
EOSINOPHIL # BLD AUTO: 0 K/UL (ref 0–0.5)
EOSINOPHIL NFR BLD: 1.1 % (ref 0–8)
ERYTHROCYTE [DISTWIDTH] IN BLOOD BY AUTOMATED COUNT: 14.5 % (ref 11.5–14.5)
HCG INTACT+B SERPL-ACNC: NORMAL MIU/ML
HCT VFR BLD AUTO: 38.4 % (ref 37–48.5)
HGB BLD-MCNC: 12.4 G/DL (ref 12–16)
IMM GRANULOCYTES # BLD AUTO: 0.01 K/UL (ref 0–0.04)
IMM GRANULOCYTES NFR BLD AUTO: 0.3 % (ref 0–0.5)
LYMPHOCYTES # BLD AUTO: 1.4 K/UL (ref 1–4.8)
LYMPHOCYTES NFR BLD: 39.5 % (ref 18–48)
MCH RBC QN AUTO: 26.8 PG (ref 27–31)
MCHC RBC AUTO-ENTMCNC: 32.3 G/DL (ref 32–36)
MCV RBC AUTO: 83 FL (ref 82–98)
MONOCYTES # BLD AUTO: 0.3 K/UL (ref 0.3–1)
MONOCYTES NFR BLD: 7.9 % (ref 4–15)
NEUTROPHILS # BLD AUTO: 1.8 K/UL (ref 1.8–7.7)
NEUTROPHILS NFR BLD: 50.4 % (ref 38–73)
NRBC BLD-RTO: 0 /100 WBC
PLATELET # BLD AUTO: 234 K/UL (ref 150–450)
PMV BLD AUTO: 9.5 FL (ref 9.2–12.9)
RBC # BLD AUTO: 4.63 M/UL (ref 4–5.4)
WBC # BLD AUTO: 3.65 K/UL (ref 3.9–12.7)

## 2022-02-07 PROCEDURE — 87186 SC STD MICRODIL/AGAR DIL: CPT | Performed by: OBSTETRICS & GYNECOLOGY

## 2022-02-07 PROCEDURE — 87088 URINE BACTERIA CULTURE: CPT | Performed by: OBSTETRICS & GYNECOLOGY

## 2022-02-07 PROCEDURE — 87086 URINE CULTURE/COLONY COUNT: CPT | Performed by: OBSTETRICS & GYNECOLOGY

## 2022-02-07 PROCEDURE — 36415 COLL VENOUS BLD VENIPUNCTURE: CPT | Performed by: OBSTETRICS & GYNECOLOGY

## 2022-02-07 PROCEDURE — 85025 COMPLETE CBC W/AUTO DIFF WBC: CPT | Performed by: OBSTETRICS & GYNECOLOGY

## 2022-02-07 PROCEDURE — 99999 PR PBB SHADOW E&M-EST. PATIENT-LVL III: CPT | Mod: PBBFAC,,, | Performed by: OBSTETRICS & GYNECOLOGY

## 2022-02-07 PROCEDURE — 99214 OFFICE O/P EST MOD 30 MIN: CPT | Mod: TH,S$PBB,, | Performed by: OBSTETRICS & GYNECOLOGY

## 2022-02-07 PROCEDURE — 87591 N.GONORRHOEAE DNA AMP PROB: CPT | Performed by: OBSTETRICS & GYNECOLOGY

## 2022-02-07 PROCEDURE — 99214 PR OFFICE/OUTPT VISIT, EST, LEVL IV, 30-39 MIN: ICD-10-PCS | Mod: TH,S$PBB,, | Performed by: OBSTETRICS & GYNECOLOGY

## 2022-02-07 PROCEDURE — 99213 OFFICE O/P EST LOW 20 MIN: CPT | Mod: PBBFAC,TH | Performed by: OBSTETRICS & GYNECOLOGY

## 2022-02-07 PROCEDURE — 84702 CHORIONIC GONADOTROPIN TEST: CPT | Performed by: OBSTETRICS & GYNECOLOGY

## 2022-02-07 PROCEDURE — 87491 CHLMYD TRACH DNA AMP PROBE: CPT | Performed by: OBSTETRICS & GYNECOLOGY

## 2022-02-07 PROCEDURE — 99999 PR PBB SHADOW E&M-EST. PATIENT-LVL III: ICD-10-PCS | Mod: PBBFAC,,, | Performed by: OBSTETRICS & GYNECOLOGY

## 2022-02-07 PROCEDURE — 87077 CULTURE AEROBIC IDENTIFY: CPT | Performed by: OBSTETRICS & GYNECOLOGY

## 2022-02-07 PROCEDURE — 86901 BLOOD TYPING SEROLOGIC RH(D): CPT | Performed by: OBSTETRICS & GYNECOLOGY

## 2022-02-07 RX ORDER — PRENATAL WITH FERROUS FUM AND FOLIC ACID 3080; 920; 120; 400; 22; 1.84; 3; 20; 10; 1; 12; 200; 27; 25; 2 [IU]/1; [IU]/1; MG/1; [IU]/1; MG/1; MG/1; MG/1; MG/1; MG/1; MG/1; UG/1; MG/1; MG/1; MG/1; MG/1
1 TABLET ORAL DAILY
COMMUNITY
Start: 2022-01-10 | End: 2022-06-29

## 2022-02-08 NOTE — PROGRESS NOTES
9w5d here for initial OB visit.  LMP 12/1/22.  Unfortunately, the transvaginal ultrasound showed ill defined fetal pole ~5-6 wks concerning for suspect blighted ovum.  Ultrasound findings d/w with pt in detail.  Emotional support provided.  We discussed mgt options, pt desires expectant mgt at this time with serial HCG.  Pt understands that a suction D&C may be necessarily in event of retained products of conception or if she has heavy bleeding.    Strict SAB, anemia precautions.  Return 1 wk or sooner as needed.      Julio Cesar Elias MD

## 2022-02-09 ENCOUNTER — LAB VISIT (OUTPATIENT)
Dept: LAB | Facility: HOSPITAL | Age: 37
End: 2022-02-09
Attending: OBSTETRICS & GYNECOLOGY
Payer: MEDICAID

## 2022-02-09 ENCOUNTER — TELEPHONE (OUTPATIENT)
Dept: OBSTETRICS AND GYNECOLOGY | Facility: CLINIC | Age: 37
End: 2022-02-09
Payer: MEDICAID

## 2022-02-09 DIAGNOSIS — Z3A.09 9 WEEKS GESTATION OF PREGNANCY: ICD-10-CM

## 2022-02-09 DIAGNOSIS — R82.71 ASYMPTOMATIC BACTERIURIA: Primary | ICD-10-CM

## 2022-02-09 LAB
BACTERIA UR CULT: ABNORMAL
C TRACH DNA SPEC QL NAA+PROBE: NOT DETECTED
HCG INTACT+B SERPL-ACNC: NORMAL MIU/ML
N GONORRHOEA DNA SPEC QL NAA+PROBE: NOT DETECTED

## 2022-02-09 PROCEDURE — 84702 CHORIONIC GONADOTROPIN TEST: CPT | Performed by: OBSTETRICS & GYNECOLOGY

## 2022-02-09 PROCEDURE — 36415 COLL VENOUS BLD VENIPUNCTURE: CPT | Performed by: OBSTETRICS & GYNECOLOGY

## 2022-02-09 RX ORDER — CEPHALEXIN 500 MG/1
500 CAPSULE ORAL EVERY 12 HOURS
Qty: 14 CAPSULE | Refills: 0 | Status: SHIPPED | OUTPATIENT
Start: 2022-02-09 | End: 2022-02-16

## 2022-02-09 NOTE — TELEPHONE ENCOUNTER
Left voicemail.       ----- Message from Julio Cesar Elias MD sent at 2/9/2022 11:04 AM CST -----  Please notify pt her urine cx is ABNORMAL.    A Rx for antibx (keflex) has been sent to her pharmacy on file:    [facility] - [place], [place] - [address] [address]  [id number] [address] [address]  Phone: [phone] Fax: [phone]    Advise pt on UTI precautions.    Thanks.

## 2022-02-11 ENCOUNTER — LAB VISIT (OUTPATIENT)
Dept: LAB | Facility: HOSPITAL | Age: 37
End: 2022-02-11
Attending: OBSTETRICS & GYNECOLOGY
Payer: MEDICAID

## 2022-02-11 DIAGNOSIS — Z3A.09 9 WEEKS GESTATION OF PREGNANCY: ICD-10-CM

## 2022-02-11 LAB — HCG INTACT+B SERPL-ACNC: 8831 MIU/ML

## 2022-02-11 PROCEDURE — 84702 CHORIONIC GONADOTROPIN TEST: CPT | Performed by: OBSTETRICS & GYNECOLOGY

## 2022-02-11 PROCEDURE — 36415 COLL VENOUS BLD VENIPUNCTURE: CPT | Performed by: OBSTETRICS & GYNECOLOGY

## 2022-02-14 ENCOUNTER — ROUTINE PRENATAL (OUTPATIENT)
Dept: OBSTETRICS AND GYNECOLOGY | Facility: CLINIC | Age: 37
End: 2022-02-14
Payer: MEDICAID

## 2022-02-14 VITALS — SYSTOLIC BLOOD PRESSURE: 142 MMHG | WEIGHT: 205.56 LBS | DIASTOLIC BLOOD PRESSURE: 86 MMHG | BODY MASS INDEX: 32.2 KG/M2

## 2022-02-14 DIAGNOSIS — O02.1 MISSED ABORTION: Primary | ICD-10-CM

## 2022-02-14 PROCEDURE — 99213 OFFICE O/P EST LOW 20 MIN: CPT | Mod: PBBFAC,TH | Performed by: OBSTETRICS & GYNECOLOGY

## 2022-02-14 PROCEDURE — 99999 PR PBB SHADOW E&M-EST. PATIENT-LVL III: CPT | Mod: PBBFAC,,, | Performed by: OBSTETRICS & GYNECOLOGY

## 2022-02-14 PROCEDURE — 99213 OFFICE O/P EST LOW 20 MIN: CPT | Mod: TH,S$PBB,, | Performed by: OBSTETRICS & GYNECOLOGY

## 2022-02-14 PROCEDURE — 99213 PR OFFICE/OUTPT VISIT, EST, LEVL III, 20-29 MIN: ICD-10-PCS | Mod: TH,S$PBB,, | Performed by: OBSTETRICS & GYNECOLOGY

## 2022-02-14 PROCEDURE — 99999 PR PBB SHADOW E&M-EST. PATIENT-LVL III: ICD-10-PCS | Mod: PBBFAC,,, | Performed by: OBSTETRICS & GYNECOLOGY

## 2022-02-14 RX ORDER — MISOPROSTOL 200 UG/1
400 TABLET ORAL
Qty: 10 TABLET | Refills: 0 | Status: SHIPPED | OUTPATIENT
Start: 2022-02-14 | End: 2022-03-28

## 2022-02-15 NOTE — PROGRESS NOTES
[facility]  Ambulatory Clinic  Obstetrics & Gynecology    Visit Date:  2022    Chief Complaint:  Miscarriage     History of Present Illness:      Pilar Hernandez is 37 y.o.  with early first trimester missed .    Pt reported vaginal bleeding starting yesterday, light.    Denies sxs of anemia.    Denies pelvic or abdominal pain.    Pt reports good family supportive, denies depressed mood.    Review of Systems:      GENERAL:  No fever, fatigue, excessive weight gain or loss  HEENT:  No headaches, hearing changes, visual disturbance  RESPIRATORY:  No cough, shortness of breath  CARDIOVASCULAR:  No chest pain, heart palpitations, leg swelling  BREAST:  No lump, pain, nipple discharge, skin changes  GASTROINTESTINAL:  No nausea, vomiting, abd pain   GENITOURINARY:  See HPI  HEMATOLOGIC:  No easy bruisability or bleeding   LYMPHATICS:  No enlarged nodes  MUSCULOSKELETAL:  No joint pain or swelling  SKIN:  No rash, lesions, jaundice  NEUROLOGIC:  No dizziness, weakness, syncope  PSYCHIATRIC:  No depression, homicidal/suicidal ideations, anxiety or mood swings    Physical Exam:     BP (!) 142/86   Wt 93.2 kg (205 lb 9.3 oz)   LMP 2021 (Exact Date)   Breastfeeding Unknown   BMI 32.20 kg/m²   Pulse 60's, Resp rate 18    GENERAL:  No acute distress, well-nourished  HEENT:  Atraumatic, anicteric, moist mucus membranes. .  LUNGS:  Clear to auscultation  HEART:  Regular rate and rhythm  ABDOMEN:  Soft, non-tender, non-distended, no obvious organomegaly  EXT:  Symmetric w/o cramping, claudication, or edema. +2 distal pulses.  SKIN:  No rashes or bruising  PSYCH:  Mood and affect appropriate  PELVIC:  Patient deferred today    Chaperone present for exam.    Lab Results   Component Value Date    WBC 3.65 (L) 2022    HGB 12.4 2022    HCT 38.4 2022    MCV 83 2022     2022     O POS    Component      Latest Ref Rng & Units 2022  2022   HCG Quant      See Text mIU/mL [id number]     Assessment:     Pilar Hernandez is 37 y.o.  with:    1. Missed   2. Rh positive    Plan:    We discussed missed .    Pt is interested in medical mgt with oral cytotec.  Risks, benefits, and alternatives to medical mgt discussed.  Repeat CBC, HCG in 1 week.    Pt advised that a suction D&C may be necessarily in event of retained products of conception or if she continues have heavy bleeding.    Strict SAB precautions reviewed.    Emotional support provided.  Return 4 weeks for f/u, or sooner as needed.  All questions answered, pt voiced understanding.        Julio Cesar Elias MD

## 2022-02-22 ENCOUNTER — LAB VISIT (OUTPATIENT)
Dept: LAB | Facility: HOSPITAL | Age: 37
End: 2022-02-22
Attending: OBSTETRICS & GYNECOLOGY
Payer: MEDICAID

## 2022-02-22 DIAGNOSIS — O02.1 MISSED ABORTION: ICD-10-CM

## 2022-02-22 LAB
BASOPHILS # BLD AUTO: 0.03 K/UL (ref 0–0.2)
BASOPHILS NFR BLD: 0.8 % (ref 0–1.9)
DIFFERENTIAL METHOD: ABNORMAL
EOSINOPHIL # BLD AUTO: 0.1 K/UL (ref 0–0.5)
EOSINOPHIL NFR BLD: 2.8 % (ref 0–8)
ERYTHROCYTE [DISTWIDTH] IN BLOOD BY AUTOMATED COUNT: 14.5 % (ref 11.5–14.5)
HCG INTACT+B SERPL-ACNC: 57 MIU/ML
HCT VFR BLD AUTO: 37.7 % (ref 37–48.5)
HGB BLD-MCNC: 11.8 G/DL (ref 12–16)
IMM GRANULOCYTES # BLD AUTO: 0.01 K/UL (ref 0–0.04)
IMM GRANULOCYTES NFR BLD AUTO: 0.3 % (ref 0–0.5)
LYMPHOCYTES # BLD AUTO: 1.4 K/UL (ref 1–4.8)
LYMPHOCYTES NFR BLD: 37.9 % (ref 18–48)
MCH RBC QN AUTO: 26.6 PG (ref 27–31)
MCHC RBC AUTO-ENTMCNC: 31.3 G/DL (ref 32–36)
MCV RBC AUTO: 85 FL (ref 82–98)
MONOCYTES # BLD AUTO: 0.2 K/UL (ref 0.3–1)
MONOCYTES NFR BLD: 6.7 % (ref 4–15)
NEUTROPHILS # BLD AUTO: 1.9 K/UL (ref 1.8–7.7)
NEUTROPHILS NFR BLD: 51.5 % (ref 38–73)
NRBC BLD-RTO: 0 /100 WBC
PLATELET # BLD AUTO: 214 K/UL (ref 150–450)
PMV BLD AUTO: 9.7 FL (ref 9.2–12.9)
RBC # BLD AUTO: 4.44 M/UL (ref 4–5.4)
WBC # BLD AUTO: 3.59 K/UL (ref 3.9–12.7)

## 2022-02-22 PROCEDURE — 85025 COMPLETE CBC W/AUTO DIFF WBC: CPT | Performed by: OBSTETRICS & GYNECOLOGY

## 2022-02-22 PROCEDURE — 36415 COLL VENOUS BLD VENIPUNCTURE: CPT | Performed by: OBSTETRICS & GYNECOLOGY

## 2022-02-22 PROCEDURE — 84702 CHORIONIC GONADOTROPIN TEST: CPT | Performed by: OBSTETRICS & GYNECOLOGY

## 2022-03-28 ENCOUNTER — LAB VISIT (OUTPATIENT)
Dept: LAB | Facility: HOSPITAL | Age: 37
End: 2022-03-28
Attending: OBSTETRICS & GYNECOLOGY
Payer: MEDICAID

## 2022-03-28 ENCOUNTER — OFFICE VISIT (OUTPATIENT)
Dept: FAMILY MEDICINE | Facility: CLINIC | Age: 37
End: 2022-03-28
Payer: MEDICAID

## 2022-03-28 VITALS
HEART RATE: 80 BPM | RESPIRATION RATE: 18 BRPM | SYSTOLIC BLOOD PRESSURE: 116 MMHG | TEMPERATURE: 98 F | HEIGHT: 67 IN | DIASTOLIC BLOOD PRESSURE: 76 MMHG | BODY MASS INDEX: 32.8 KG/M2 | OXYGEN SATURATION: 98 % | WEIGHT: 209 LBS

## 2022-03-28 DIAGNOSIS — O02.1 MISSED ABORTION: ICD-10-CM

## 2022-03-28 DIAGNOSIS — Z87.59 MISCARRIAGE WITHIN LAST 12 MONTHS: ICD-10-CM

## 2022-03-28 DIAGNOSIS — Z00.00 ANNUAL PHYSICAL EXAM: Primary | ICD-10-CM

## 2022-03-28 DIAGNOSIS — R11.0 NAUSEA: ICD-10-CM

## 2022-03-28 DIAGNOSIS — Z00.00 ANNUAL PHYSICAL EXAM: ICD-10-CM

## 2022-03-28 DIAGNOSIS — B37.2 CANDIDAL INTERTRIGO: ICD-10-CM

## 2022-03-28 LAB
ALBUMIN SERPL BCP-MCNC: 3.9 G/DL (ref 3.5–5.2)
ALP SERPL-CCNC: 55 U/L (ref 55–135)
ALT SERPL W/O P-5'-P-CCNC: 9 U/L (ref 10–44)
ANION GAP SERPL CALC-SCNC: 9 MMOL/L (ref 8–16)
AST SERPL-CCNC: 15 U/L (ref 10–40)
BASOPHILS # BLD AUTO: 0.05 K/UL (ref 0–0.2)
BASOPHILS # BLD AUTO: 0.05 K/UL (ref 0–0.2)
BASOPHILS NFR BLD: 1.1 % (ref 0–1.9)
BASOPHILS NFR BLD: 1.1 % (ref 0–1.9)
BILIRUB SERPL-MCNC: 0.7 MG/DL (ref 0.1–1)
BUN SERPL-MCNC: 11 MG/DL (ref 6–20)
CALCIUM SERPL-MCNC: 8.8 MG/DL (ref 8.7–10.5)
CHLORIDE SERPL-SCNC: 108 MMOL/L (ref 95–110)
CHOLEST SERPL-MCNC: 143 MG/DL (ref 120–199)
CHOLEST/HDLC SERPL: 2.6 {RATIO} (ref 2–5)
CO2 SERPL-SCNC: 22 MMOL/L (ref 23–29)
CREAT SERPL-MCNC: 0.8 MG/DL (ref 0.5–1.4)
DIFFERENTIAL METHOD: ABNORMAL
DIFFERENTIAL METHOD: ABNORMAL
EOSINOPHIL # BLD AUTO: 0.1 K/UL (ref 0–0.5)
EOSINOPHIL # BLD AUTO: 0.1 K/UL (ref 0–0.5)
EOSINOPHIL NFR BLD: 1.1 % (ref 0–8)
EOSINOPHIL NFR BLD: 1.1 % (ref 0–8)
ERYTHROCYTE [DISTWIDTH] IN BLOOD BY AUTOMATED COUNT: 14.1 % (ref 11.5–14.5)
ERYTHROCYTE [DISTWIDTH] IN BLOOD BY AUTOMATED COUNT: 14.1 % (ref 11.5–14.5)
EST. GFR  (AFRICAN AMERICAN): >60 ML/MIN/1.73 M^2
EST. GFR  (NON AFRICAN AMERICAN): >60 ML/MIN/1.73 M^2
ESTIMATED AVG GLUCOSE: 100 MG/DL (ref 68–131)
GLUCOSE SERPL-MCNC: 89 MG/DL (ref 70–110)
HBA1C MFR BLD: 5.1 % (ref 4–5.6)
HCG INTACT+B SERPL-ACNC: 1668 MIU/ML
HCT VFR BLD AUTO: 36.9 % (ref 37–48.5)
HCT VFR BLD AUTO: 36.9 % (ref 37–48.5)
HDLC SERPL-MCNC: 55 MG/DL (ref 40–75)
HDLC SERPL: 38.5 % (ref 20–50)
HGB BLD-MCNC: 11.6 G/DL (ref 12–16)
HGB BLD-MCNC: 11.6 G/DL (ref 12–16)
IMM GRANULOCYTES # BLD AUTO: 0.01 K/UL (ref 0–0.04)
IMM GRANULOCYTES # BLD AUTO: 0.01 K/UL (ref 0–0.04)
IMM GRANULOCYTES NFR BLD AUTO: 0.2 % (ref 0–0.5)
IMM GRANULOCYTES NFR BLD AUTO: 0.2 % (ref 0–0.5)
LDLC SERPL CALC-MCNC: 81.4 MG/DL (ref 63–159)
LYMPHOCYTES # BLD AUTO: 1.4 K/UL (ref 1–4.8)
LYMPHOCYTES # BLD AUTO: 1.4 K/UL (ref 1–4.8)
LYMPHOCYTES NFR BLD: 32.9 % (ref 18–48)
LYMPHOCYTES NFR BLD: 32.9 % (ref 18–48)
MCH RBC QN AUTO: 26.7 PG (ref 27–31)
MCH RBC QN AUTO: 26.7 PG (ref 27–31)
MCHC RBC AUTO-ENTMCNC: 31.4 G/DL (ref 32–36)
MCHC RBC AUTO-ENTMCNC: 31.4 G/DL (ref 32–36)
MCV RBC AUTO: 85 FL (ref 82–98)
MCV RBC AUTO: 85 FL (ref 82–98)
MONOCYTES # BLD AUTO: 0.4 K/UL (ref 0.3–1)
MONOCYTES # BLD AUTO: 0.4 K/UL (ref 0.3–1)
MONOCYTES NFR BLD: 8.4 % (ref 4–15)
MONOCYTES NFR BLD: 8.4 % (ref 4–15)
NEUTROPHILS # BLD AUTO: 2.5 K/UL (ref 1.8–7.7)
NEUTROPHILS # BLD AUTO: 2.5 K/UL (ref 1.8–7.7)
NEUTROPHILS NFR BLD: 56.3 % (ref 38–73)
NEUTROPHILS NFR BLD: 56.3 % (ref 38–73)
NONHDLC SERPL-MCNC: 88 MG/DL
NRBC BLD-RTO: 0 /100 WBC
NRBC BLD-RTO: 0 /100 WBC
PLATELET # BLD AUTO: 238 K/UL (ref 150–450)
PLATELET # BLD AUTO: 238 K/UL (ref 150–450)
PMV BLD AUTO: 10.7 FL (ref 9.2–12.9)
PMV BLD AUTO: 10.7 FL (ref 9.2–12.9)
POTASSIUM SERPL-SCNC: 4.1 MMOL/L (ref 3.5–5.1)
PROT SERPL-MCNC: 7.6 G/DL (ref 6–8.4)
RBC # BLD AUTO: 4.34 M/UL (ref 4–5.4)
RBC # BLD AUTO: 4.34 M/UL (ref 4–5.4)
SODIUM SERPL-SCNC: 139 MMOL/L (ref 136–145)
TRIGL SERPL-MCNC: 33 MG/DL (ref 30–150)
TSH SERPL DL<=0.005 MIU/L-ACNC: 2.01 UIU/ML (ref 0.4–4)
WBC # BLD AUTO: 4.38 K/UL (ref 3.9–12.7)
WBC # BLD AUTO: 4.38 K/UL (ref 3.9–12.7)

## 2022-03-28 PROCEDURE — 83036 HEMOGLOBIN GLYCOSYLATED A1C: CPT | Performed by: INTERNAL MEDICINE

## 2022-03-28 PROCEDURE — 80061 LIPID PANEL: CPT | Performed by: INTERNAL MEDICINE

## 2022-03-28 PROCEDURE — 3078F PR MOST RECENT DIASTOLIC BLOOD PRESSURE < 80 MM HG: ICD-10-PCS | Mod: CPTII,,, | Performed by: INTERNAL MEDICINE

## 2022-03-28 PROCEDURE — 3074F SYST BP LT 130 MM HG: CPT | Mod: CPTII,,, | Performed by: INTERNAL MEDICINE

## 2022-03-28 PROCEDURE — 84702 CHORIONIC GONADOTROPIN TEST: CPT | Performed by: INTERNAL MEDICINE

## 2022-03-28 PROCEDURE — 99395 PR PREVENTIVE VISIT,EST,18-39: ICD-10-PCS | Mod: S$PBB,,, | Performed by: INTERNAL MEDICINE

## 2022-03-28 PROCEDURE — 3008F PR BODY MASS INDEX (BMI) DOCUMENTED: ICD-10-PCS | Mod: CPTII,,, | Performed by: INTERNAL MEDICINE

## 2022-03-28 PROCEDURE — 80053 COMPREHEN METABOLIC PANEL: CPT | Performed by: INTERNAL MEDICINE

## 2022-03-28 PROCEDURE — 1160F RVW MEDS BY RX/DR IN RCRD: CPT | Mod: CPTII,,, | Performed by: INTERNAL MEDICINE

## 2022-03-28 PROCEDURE — 3008F BODY MASS INDEX DOCD: CPT | Mod: CPTII,,, | Performed by: INTERNAL MEDICINE

## 2022-03-28 PROCEDURE — 1159F MED LIST DOCD IN RCRD: CPT | Mod: CPTII,,, | Performed by: INTERNAL MEDICINE

## 2022-03-28 PROCEDURE — 3074F PR MOST RECENT SYSTOLIC BLOOD PRESSURE < 130 MM HG: ICD-10-PCS | Mod: CPTII,,, | Performed by: INTERNAL MEDICINE

## 2022-03-28 PROCEDURE — 99999 PR PBB SHADOW E&M-EST. PATIENT-LVL III: ICD-10-PCS | Mod: PBBFAC,,, | Performed by: INTERNAL MEDICINE

## 2022-03-28 PROCEDURE — 85025 COMPLETE CBC W/AUTO DIFF WBC: CPT | Performed by: OBSTETRICS & GYNECOLOGY

## 2022-03-28 PROCEDURE — 3078F DIAST BP <80 MM HG: CPT | Mod: CPTII,,, | Performed by: INTERNAL MEDICINE

## 2022-03-28 PROCEDURE — 1159F PR MEDICATION LIST DOCUMENTED IN MEDICAL RECORD: ICD-10-PCS | Mod: CPTII,,, | Performed by: INTERNAL MEDICINE

## 2022-03-28 PROCEDURE — 99999 PR PBB SHADOW E&M-EST. PATIENT-LVL III: CPT | Mod: PBBFAC,,, | Performed by: INTERNAL MEDICINE

## 2022-03-28 PROCEDURE — 1160F PR REVIEW ALL MEDS BY PRESCRIBER/CLIN PHARMACIST DOCUMENTED: ICD-10-PCS | Mod: CPTII,,, | Performed by: INTERNAL MEDICINE

## 2022-03-28 PROCEDURE — 36415 COLL VENOUS BLD VENIPUNCTURE: CPT | Mod: PO | Performed by: INTERNAL MEDICINE

## 2022-03-28 PROCEDURE — 99395 PREV VISIT EST AGE 18-39: CPT | Mod: S$PBB,,, | Performed by: INTERNAL MEDICINE

## 2022-03-28 PROCEDURE — 84443 ASSAY THYROID STIM HORMONE: CPT | Performed by: INTERNAL MEDICINE

## 2022-03-28 PROCEDURE — 99213 OFFICE O/P EST LOW 20 MIN: CPT | Mod: PBBFAC,PO | Performed by: INTERNAL MEDICINE

## 2022-03-28 RX ORDER — NYSTATIN AND TRIAMCINOLONE ACETONIDE 100000; 1 [USP'U]/G; MG/G
CREAM TOPICAL 2 TIMES DAILY
Qty: 60 G | Refills: 2 | Status: SHIPPED | OUTPATIENT
Start: 2022-03-28 | End: 2023-01-09 | Stop reason: SDUPTHER

## 2022-03-28 NOTE — PROGRESS NOTES
SUBJECTIVE     Chief Complaint   Patient presents with    Annual Exam       HPI  Pilar Hernandez is a 37 y.o. female with multiple medical diagnoses as listed in the medical history and problem list that presents for annual exam. Pt has been doing okay since her last visit, but just had a miscarriage last month and now has some nausea. She has a good appetite and eats well. She does exercise by jogging 1 mile per day and resistance training. She sleeps for ~6-7 hours nightly. Pt does take OTC supplements, which is a MVI. She does not have any current stressors. Pt is UTD on age appropriate CA screening.    PAST MEDICAL HISTORY:  Past Medical History:   Diagnosis Date    Anemia     Infertility        PAST SURGICAL HISTORY:  Past Surgical History:   Procedure Laterality Date    verticle  sleeve  2013    patient lost 109# Dr. Carlos Bowles        SOCIAL HISTORY:  Social History     Socioeconomic History    Marital status:    Tobacco Use    Smoking status: Never Smoker    Smokeless tobacco: Never Used   Substance and Sexual Activity    Alcohol use: Yes     Comment: prior to pregnancy    Drug use: No    Sexual activity: Yes     Partners: Male     Birth control/protection: None     Comment: LMP 3/13/2020 pregnant     Social Determinants of Health     Financial Resource Strain: Low Risk     Difficulty of Paying Living Expenses: Not very hard   Food Insecurity: No Food Insecurity    Worried About Running Out of Food in the Last Year: Never true    Ran Out of Food in the Last Year: Never true   Transportation Needs: No Transportation Needs    Lack of Transportation (Medical): No    Lack of Transportation (Non-Medical): No   Physical Activity: Inactive    Days of Exercise per Week: 0 days    Minutes of Exercise per Session: 0 min   Stress: No Stress Concern Present    Feeling of Stress : Only a little   Social Connections: Unknown    Frequency of Communication with Friends and Family: More  than three times a week    Frequency of Social Gatherings with Friends and Family: More than three times a week    Attends Gnosticism Services: More than 4 times per year    Active Member of Clubs or Organizations: No    Attends Club or Organization Meetings: Never    Marital Status: Patient refused   Housing Stability: Unknown    Unable to Pay for Housing in the Last Year: No    Unstable Housing in the Last Year: No       FAMILY HISTORY:  Family History   Problem Relation Age of Onset    Diabetes Mother     Hypertension Mother     Breast cancer Neg Hx     Cancer Neg Hx     Colon cancer Neg Hx     Eclampsia Neg Hx     Miscarriages / Stillbirths Neg Hx     Ovarian cancer Neg Hx      labor Neg Hx     Stroke Neg Hx        ALLERGIES AND MEDICATIONS: updated and reviewed.  Review of patient's allergies indicates:  No Known Allergies  Current Outpatient Medications   Medication Sig Dispense Refill    labetaloL (NORMODYNE) 100 MG tablet Take 1 tablet (100 mg total) by mouth 2 (two) times daily. 60 tablet 2    NIFEdipine (PROCARDIA-XL) 90 MG (OSM) 24 hr tablet Take 1 tablet (90 mg total) by mouth once daily. 30 tablet 2    nystatin (MYCOSTATIN) ointment Apply topically 2 (two) times daily. 30 g 2    PRENATAL VITAMIN PLUS LOW IRON 27 mg iron- 1 mg Tab Take 1 tablet by mouth once daily.      nystatin-triamcinolone (MYCOLOG II) cream Apply topically 2 (two) times daily. 60 g 2     No current facility-administered medications for this visit.       ROS  Review of Systems   Constitutional: Negative for activity change and unexpected weight change.   HENT: Negative for hearing loss, rhinorrhea and trouble swallowing.    Eyes: Negative for discharge and visual disturbance.   Respiratory: Negative for chest tightness and wheezing.    Cardiovascular: Negative for chest pain and palpitations.   Gastrointestinal: Positive for nausea. Negative for blood in stool, constipation, diarrhea and vomiting.  "  Endocrine: Negative for polydipsia and polyuria.   Genitourinary: Negative for difficulty urinating, dysuria, hematuria and menstrual problem.   Musculoskeletal: Positive for arthralgias (R 3rd digit pain). Negative for joint swelling and neck pain.   Skin: Negative for rash and wound.   Neurological: Negative for weakness and headaches.   Psychiatric/Behavioral: Negative for confusion and dysphoric mood.         OBJECTIVE     Physical Exam  Vitals:    03/28/22 0917   BP: 116/76   Pulse: 80   Resp: 18   Temp: 97.9 °F (36.6 °C)    Body mass index is 32.73 kg/m².  Weight: 94.8 kg (209 lb)   Height: 5' 7" (170.2 cm)     Physical Exam  Constitutional:       General: She is not in acute distress.     Appearance: She is well-developed.   HENT:      Head: Normocephalic and atraumatic.      Right Ear: Tympanic membrane, ear canal and external ear normal.      Left Ear: Tympanic membrane, ear canal and external ear normal.      Nose: Nose normal.   Eyes:      General: No scleral icterus.        Right eye: No discharge.         Left eye: No discharge.      Conjunctiva/sclera: Conjunctivae normal.   Neck:      Vascular: No JVD.      Trachea: No tracheal deviation.   Cardiovascular:      Rate and Rhythm: Normal rate and regular rhythm.      Heart sounds: No murmur heard.    No friction rub. No gallop.   Pulmonary:      Effort: Pulmonary effort is normal. No respiratory distress.      Breath sounds: Normal breath sounds. No wheezing.   Abdominal:      General: Bowel sounds are normal. There is no distension.      Palpations: Abdomen is soft. There is no mass.      Tenderness: There is no abdominal tenderness. There is no guarding or rebound.   Musculoskeletal:         General: No tenderness or deformity. Normal range of motion.      Cervical back: Normal range of motion and neck supple.   Skin:     General: Skin is warm and dry.      Findings: No erythema or rash.   Neurological:      Mental Status: She is alert and oriented " to person, place, and time.      Motor: No abnormal muscle tone.      Coordination: Coordination normal.   Psychiatric:         Behavior: Behavior normal.         Thought Content: Thought content normal.         Judgment: Judgment normal.           Health Maintenance       Date Due Completion Date    Influenza Vaccine (1) 09/01/2021 12/11/2020    Cervical Cancer Screening 09/30/2024 9/30/2019    TETANUS VACCINE 10/13/2030 10/13/2020            ASSESSMENT     37 y.o. female with     1. Annual physical exam    2. Nausea    3. Miscarriage within last 12 months    4. Candidal intertrigo        PLAN:     1. Annual physical exam  - Counseled on age appropriate medical preventative services, including age appropriate cancer screenings, over all nutritional health, need for a consistent exercise regimen and an over all push towards maintaining a vigorous and active lifestyle.  Counseled on age appropriate vaccines and discussed upcoming health care needs based on age/gender.  Spent time with patient counseling on need for a good patient/doctor relationship moving forward.  Discussed use of common OTC medications and supplements.  Discussed common dietary aids and use of caffeine and the need for good sleep hygiene and stress management.  - CBC Auto Differential; Future  - Comprehensive Metabolic Panel; Future  - Hemoglobin A1C; Future  - Lipid Panel; Future  - TSH; Future    2. Nausea  - Repeat HCG  - HCG, Quantitative; Future    3. Miscarriage within last 12 months  - As above    4. Candidal intertrigo  - Change Nystatin to add steroid ointment  - nystatin-triamcinolone (MYCOLOG II) cream; Apply topically 2 (two) times daily.  Dispense: 60 g; Refill: 2        RTC in 6 months     Dana Germain MD  03/28/2022 9:26 AM        No follow-ups on file.

## 2022-04-04 ENCOUNTER — LAB VISIT (OUTPATIENT)
Dept: LAB | Facility: HOSPITAL | Age: 37
End: 2022-04-04
Attending: OBSTETRICS & GYNECOLOGY
Payer: MEDICAID

## 2022-04-04 ENCOUNTER — INITIAL PRENATAL (OUTPATIENT)
Dept: OBSTETRICS AND GYNECOLOGY | Facility: CLINIC | Age: 37
End: 2022-04-04
Payer: MEDICAID

## 2022-04-04 DIAGNOSIS — Z3A.01 6 WEEKS GESTATION OF PREGNANCY: ICD-10-CM

## 2022-04-04 DIAGNOSIS — Z3A.01 6 WEEKS GESTATION OF PREGNANCY: Primary | ICD-10-CM

## 2022-04-04 DIAGNOSIS — O09.529 ANTEPARTUM MULTIGRAVIDA OF ADVANCED MATERNAL AGE: ICD-10-CM

## 2022-04-04 DIAGNOSIS — O10.919 CHRONIC HYPERTENSION DURING PREGNANCY, ANTEPARTUM: ICD-10-CM

## 2022-04-04 LAB — HCG INTACT+B SERPL-ACNC: 6335 MIU/ML

## 2022-04-04 PROCEDURE — 36415 COLL VENOUS BLD VENIPUNCTURE: CPT | Performed by: OBSTETRICS & GYNECOLOGY

## 2022-04-04 PROCEDURE — 84702 CHORIONIC GONADOTROPIN TEST: CPT | Performed by: OBSTETRICS & GYNECOLOGY

## 2022-04-04 PROCEDURE — 99213 PR OFFICE/OUTPT VISIT, EST, LEVL III, 20-29 MIN: ICD-10-PCS | Mod: TH,S$PBB,, | Performed by: OBSTETRICS & GYNECOLOGY

## 2022-04-04 PROCEDURE — 99999 PR PBB SHADOW E&M-EST. PATIENT-LVL III: CPT | Mod: PBBFAC,,, | Performed by: OBSTETRICS & GYNECOLOGY

## 2022-04-04 PROCEDURE — 99213 OFFICE O/P EST LOW 20 MIN: CPT | Mod: PBBFAC,TH | Performed by: OBSTETRICS & GYNECOLOGY

## 2022-04-04 PROCEDURE — 99999 PR PBB SHADOW E&M-EST. PATIENT-LVL III: ICD-10-PCS | Mod: PBBFAC,,, | Performed by: OBSTETRICS & GYNECOLOGY

## 2022-04-04 PROCEDURE — 99213 OFFICE O/P EST LOW 20 MIN: CPT | Mod: TH,S$PBB,, | Performed by: OBSTETRICS & GYNECOLOGY

## 2022-04-04 PROCEDURE — 87491 CHLMYD TRACH DNA AMP PROBE: CPT | Performed by: OBSTETRICS & GYNECOLOGY

## 2022-04-04 PROCEDURE — 87591 N.GONORRHOEAE DNA AMP PROB: CPT | Performed by: OBSTETRICS & GYNECOLOGY

## 2022-04-04 PROCEDURE — 87086 URINE CULTURE/COLONY COUNT: CPT | Performed by: OBSTETRICS & GYNECOLOGY

## 2022-04-04 NOTE — PROGRESS NOTES
37 y.o.  at 6w6d here for initial OB visit.    Pregnancy complicated by:  AMA  Chronic hypertension on labetalol    OB History    Para Term  AB Living   5 3 3 0 1 3   SAB IAB Ectopic Multiple Live Births   1 0 0 0 3      # Outcome Date GA Lbr Urban/2nd Weight Sex Delivery Anes PTL Lv   5 Current            4 SAB 22 10w0d          3 Term 12/10/20 39w3d / 00:55 3.53 kg (7 lb 12.5 oz) F Vag-Spont EPI N NICOLE      Name: ARMANDO CHANG,LEANA ANGE      Apgar1: 9  Apgar5: 9   2 Term 19 38w6d  3.33 kg (7 lb 5.5 oz) M Vag-Spont EPI N NICOLE      Name: DARIEN CHANG ANGE      Apgar1: 9  Apgar5: 9   1 Term 18 39w4d / 03:38 3.24 kg (7 lb 2.3 oz) F Vag-Spont EPI N NICOLE      Name: LEANA CHANG ANGE      Apgar1: 8  Apgar5: 9     Pt has no major complaints today.  Denies vaginal bleeding or pain.    Pt had a spontaneous  2022.  Order serial HCG q2d x 3 per pt request.  SAB/ectopic precautions.    Discuss with pt implications of uncontrolled HTN on pregnancy.  Continue labetalol 100 mg bid.  Start home BP monitoring TID, parameters to call reviewed.  Discussed daily ASA therapy for prevention of preE starting 12 wks gestation.  Start anatenatal testing as pregnancy progresses.  Timing of delivery will depend on BP control and maternal/fetal status.  Encourage healthy lifestyle modifications.  Refer to MFM as pregnancy progresses.    Discussed implications of AMA on pregnancy.    Pt is not interested in first trimester screening at this time.    Order initial OB lab profile next visit once pregnancy is confirmed.   Refer to M for dating US as pregnancy progresses.    Discussed Connective MOM program.  Pt has rec'd Covid vaccine.    Principles of prenatal care and precautions reviewed.  Prenatal vitamins.  Return 4 wks or sooner prn.  All questions answered, voiced understanding.  Significant other present for visit.      Julio Cesar Elias MD

## 2022-04-06 ENCOUNTER — LAB VISIT (OUTPATIENT)
Dept: LAB | Facility: HOSPITAL | Age: 37
End: 2022-04-06
Attending: OBSTETRICS & GYNECOLOGY
Payer: MEDICAID

## 2022-04-06 DIAGNOSIS — Z3A.01 6 WEEKS GESTATION OF PREGNANCY: ICD-10-CM

## 2022-04-06 LAB
BACTERIA UR CULT: NORMAL
C TRACH DNA SPEC QL NAA+PROBE: NOT DETECTED
HCG INTACT+B SERPL-ACNC: 7712 MIU/ML
N GONORRHOEA DNA SPEC QL NAA+PROBE: NOT DETECTED

## 2022-04-06 PROCEDURE — 84702 CHORIONIC GONADOTROPIN TEST: CPT | Performed by: OBSTETRICS & GYNECOLOGY

## 2022-04-06 PROCEDURE — 36415 COLL VENOUS BLD VENIPUNCTURE: CPT | Mod: PO | Performed by: OBSTETRICS & GYNECOLOGY

## 2022-04-08 ENCOUNTER — LAB VISIT (OUTPATIENT)
Dept: LAB | Facility: HOSPITAL | Age: 37
End: 2022-04-08
Attending: OBSTETRICS & GYNECOLOGY
Payer: MEDICAID

## 2022-04-08 DIAGNOSIS — Z3A.01 6 WEEKS GESTATION OF PREGNANCY: ICD-10-CM

## 2022-04-08 DIAGNOSIS — O02.1 MISSED ABORTION: ICD-10-CM

## 2022-04-08 LAB
BASOPHILS # BLD AUTO: 0.04 K/UL (ref 0–0.2)
BASOPHILS NFR BLD: 0.9 % (ref 0–1.9)
DIFFERENTIAL METHOD: ABNORMAL
EOSINOPHIL # BLD AUTO: 0.2 K/UL (ref 0–0.5)
EOSINOPHIL NFR BLD: 3.6 % (ref 0–8)
ERYTHROCYTE [DISTWIDTH] IN BLOOD BY AUTOMATED COUNT: 14.1 % (ref 11.5–14.5)
HCG INTACT+B SERPL-ACNC: 9429 MIU/ML
HCG INTACT+B SERPL-ACNC: 9429 MIU/ML
HCT VFR BLD AUTO: 37.3 % (ref 37–48.5)
HGB BLD-MCNC: 11.5 G/DL (ref 12–16)
IMM GRANULOCYTES # BLD AUTO: 0.01 K/UL (ref 0–0.04)
IMM GRANULOCYTES NFR BLD AUTO: 0.2 % (ref 0–0.5)
LYMPHOCYTES # BLD AUTO: 1.2 K/UL (ref 1–4.8)
LYMPHOCYTES NFR BLD: 26.1 % (ref 18–48)
MCH RBC QN AUTO: 26.7 PG (ref 27–31)
MCHC RBC AUTO-ENTMCNC: 30.8 G/DL (ref 32–36)
MCV RBC AUTO: 87 FL (ref 82–98)
MONOCYTES # BLD AUTO: 0.4 K/UL (ref 0.3–1)
MONOCYTES NFR BLD: 8 % (ref 4–15)
NEUTROPHILS # BLD AUTO: 2.7 K/UL (ref 1.8–7.7)
NEUTROPHILS NFR BLD: 61.2 % (ref 38–73)
NRBC BLD-RTO: 0 /100 WBC
PLATELET # BLD AUTO: 182 K/UL (ref 150–450)
PMV BLD AUTO: 10.4 FL (ref 9.2–12.9)
RBC # BLD AUTO: 4.3 M/UL (ref 4–5.4)
WBC # BLD AUTO: 4.48 K/UL (ref 3.9–12.7)

## 2022-04-08 PROCEDURE — 85025 COMPLETE CBC W/AUTO DIFF WBC: CPT | Performed by: OBSTETRICS & GYNECOLOGY

## 2022-04-08 PROCEDURE — 84702 CHORIONIC GONADOTROPIN TEST: CPT | Performed by: OBSTETRICS & GYNECOLOGY

## 2022-04-08 PROCEDURE — 36415 COLL VENOUS BLD VENIPUNCTURE: CPT | Mod: PO | Performed by: OBSTETRICS & GYNECOLOGY

## 2022-04-09 VITALS
SYSTOLIC BLOOD PRESSURE: 142 MMHG | HEART RATE: 64 BPM | WEIGHT: 214 LBS | BODY MASS INDEX: 33.51 KG/M2 | DIASTOLIC BLOOD PRESSURE: 90 MMHG

## 2022-04-09 PROBLEM — O10.919 CHRONIC HYPERTENSION DURING PREGNANCY, ANTEPARTUM: Status: ACTIVE | Noted: 2022-04-09

## 2022-04-11 ENCOUNTER — TELEPHONE (OUTPATIENT)
Dept: OBSTETRICS AND GYNECOLOGY | Facility: CLINIC | Age: 37
End: 2022-04-11
Payer: MEDICAID

## 2022-04-11 NOTE — TELEPHONE ENCOUNTER
Pt stated she completed her labs and will be waiting on a call from Dr. Elias to see what she should do next.      ----- Message from Kaylin Amin sent at 4/11/2022 11:09 AM CDT -----  Type: Patient Call Back    Who called: self    What is the request in detail: pt called stating that she had some blood work done and wanted to know what would be the next step. Please advise    Can the clinic reply by [facility]? No     Would the patient rather a call back or a response via [facility]? Call    Best call back number:.[phone]

## 2022-04-12 ENCOUNTER — ROUTINE PRENATAL (OUTPATIENT)
Dept: OBSTETRICS AND GYNECOLOGY | Facility: CLINIC | Age: 37
End: 2022-04-12
Payer: MEDICAID

## 2022-04-12 ENCOUNTER — LAB VISIT (OUTPATIENT)
Dept: LAB | Facility: HOSPITAL | Age: 37
End: 2022-04-12
Attending: OBSTETRICS & GYNECOLOGY
Payer: MEDICAID

## 2022-04-12 VITALS
DIASTOLIC BLOOD PRESSURE: 84 MMHG | SYSTOLIC BLOOD PRESSURE: 126 MMHG | WEIGHT: 214.81 LBS | BODY MASS INDEX: 33.65 KG/M2

## 2022-04-12 DIAGNOSIS — Z3A.01 7 WEEKS GESTATION OF PREGNANCY: Primary | ICD-10-CM

## 2022-04-12 DIAGNOSIS — O09.529 ANTEPARTUM MULTIGRAVIDA OF ADVANCED MATERNAL AGE: ICD-10-CM

## 2022-04-12 DIAGNOSIS — O10.919 CHRONIC HYPERTENSION DURING PREGNANCY, ANTEPARTUM: ICD-10-CM

## 2022-04-12 DIAGNOSIS — Z3A.01 7 WEEKS GESTATION OF PREGNANCY: ICD-10-CM

## 2022-04-12 LAB
ABO + RH BLD: NORMAL
ALBUMIN SERPL BCP-MCNC: 3.8 G/DL (ref 3.5–5.2)
ALP SERPL-CCNC: 57 U/L (ref 55–135)
ALT SERPL W/O P-5'-P-CCNC: 5 U/L (ref 10–44)
ANION GAP SERPL CALC-SCNC: 6 MMOL/L (ref 8–16)
AST SERPL-CCNC: 12 U/L (ref 10–40)
BASOPHILS # BLD AUTO: 0.03 K/UL (ref 0–0.2)
BASOPHILS NFR BLD: 0.7 % (ref 0–1.9)
BILIRUB SERPL-MCNC: 0.4 MG/DL (ref 0.1–1)
BLD GP AB SCN CELLS X3 SERPL QL: NORMAL
BUN SERPL-MCNC: 8 MG/DL (ref 6–20)
CALCIUM SERPL-MCNC: 8.9 MG/DL (ref 8.7–10.5)
CHLORIDE SERPL-SCNC: 107 MMOL/L (ref 95–110)
CO2 SERPL-SCNC: 26 MMOL/L (ref 23–29)
CREAT SERPL-MCNC: 0.7 MG/DL (ref 0.5–1.4)
DIFFERENTIAL METHOD: ABNORMAL
EOSINOPHIL # BLD AUTO: 0 K/UL (ref 0–0.5)
EOSINOPHIL NFR BLD: 0.9 % (ref 0–8)
ERYTHROCYTE [DISTWIDTH] IN BLOOD BY AUTOMATED COUNT: 13.6 % (ref 11.5–14.5)
EST. GFR  (AFRICAN AMERICAN): >60 ML/MIN/1.73 M^2
EST. GFR  (NON AFRICAN AMERICAN): >60 ML/MIN/1.73 M^2
ESTIMATED AVG GLUCOSE: 105 MG/DL (ref 68–131)
GLUCOSE SERPL-MCNC: 88 MG/DL (ref 70–110)
HBA1C MFR BLD: 5.3 % (ref 4–5.6)
HCT VFR BLD AUTO: 36.2 % (ref 37–48.5)
HGB BLD-MCNC: 11.7 G/DL (ref 12–16)
IMM GRANULOCYTES # BLD AUTO: 0.01 K/UL (ref 0–0.04)
IMM GRANULOCYTES NFR BLD AUTO: 0.2 % (ref 0–0.5)
LYMPHOCYTES # BLD AUTO: 1.5 K/UL (ref 1–4.8)
LYMPHOCYTES NFR BLD: 35 % (ref 18–48)
MCH RBC QN AUTO: 27.3 PG (ref 27–31)
MCHC RBC AUTO-ENTMCNC: 32.3 G/DL (ref 32–36)
MCV RBC AUTO: 84 FL (ref 82–98)
MONOCYTES # BLD AUTO: 0.3 K/UL (ref 0.3–1)
MONOCYTES NFR BLD: 7.5 % (ref 4–15)
NEUTROPHILS # BLD AUTO: 2.5 K/UL (ref 1.8–7.7)
NEUTROPHILS NFR BLD: 55.7 % (ref 38–73)
NRBC BLD-RTO: 0 /100 WBC
PLATELET # BLD AUTO: 219 K/UL (ref 150–450)
PMV BLD AUTO: 10.3 FL (ref 9.2–12.9)
POTASSIUM SERPL-SCNC: 3.9 MMOL/L (ref 3.5–5.1)
PROT SERPL-MCNC: 7.7 G/DL (ref 6–8.4)
RBC # BLD AUTO: 4.29 M/UL (ref 4–5.4)
SODIUM SERPL-SCNC: 139 MMOL/L (ref 136–145)
WBC # BLD AUTO: 4.4 K/UL (ref 3.9–12.7)

## 2022-04-12 PROCEDURE — 85025 COMPLETE CBC W/AUTO DIFF WBC: CPT | Performed by: OBSTETRICS & GYNECOLOGY

## 2022-04-12 PROCEDURE — 86762 RUBELLA ANTIBODY: CPT | Performed by: OBSTETRICS & GYNECOLOGY

## 2022-04-12 PROCEDURE — 86803 HEPATITIS C AB TEST: CPT | Performed by: OBSTETRICS & GYNECOLOGY

## 2022-04-12 PROCEDURE — 86592 SYPHILIS TEST NON-TREP QUAL: CPT | Performed by: OBSTETRICS & GYNECOLOGY

## 2022-04-12 PROCEDURE — 80053 COMPREHEN METABOLIC PANEL: CPT | Performed by: OBSTETRICS & GYNECOLOGY

## 2022-04-12 PROCEDURE — 99213 OFFICE O/P EST LOW 20 MIN: CPT | Mod: TH,S$PBB,, | Performed by: OBSTETRICS & GYNECOLOGY

## 2022-04-12 PROCEDURE — 87389 HIV-1 AG W/HIV-1&-2 AB AG IA: CPT | Performed by: OBSTETRICS & GYNECOLOGY

## 2022-04-12 PROCEDURE — 99212 OFFICE O/P EST SF 10 MIN: CPT | Mod: PBBFAC,TH | Performed by: OBSTETRICS & GYNECOLOGY

## 2022-04-12 PROCEDURE — 36415 COLL VENOUS BLD VENIPUNCTURE: CPT | Performed by: OBSTETRICS & GYNECOLOGY

## 2022-04-12 PROCEDURE — 99213 PR OFFICE/OUTPT VISIT, EST, LEVL III, 20-29 MIN: ICD-10-PCS | Mod: TH,S$PBB,, | Performed by: OBSTETRICS & GYNECOLOGY

## 2022-04-12 PROCEDURE — 99999 PR PBB SHADOW E&M-EST. PATIENT-LVL II: CPT | Mod: PBBFAC,,, | Performed by: OBSTETRICS & GYNECOLOGY

## 2022-04-12 PROCEDURE — 83036 HEMOGLOBIN GLYCOSYLATED A1C: CPT | Performed by: OBSTETRICS & GYNECOLOGY

## 2022-04-12 PROCEDURE — 99999 PR PBB SHADOW E&M-EST. PATIENT-LVL II: ICD-10-PCS | Mod: PBBFAC,,, | Performed by: OBSTETRICS & GYNECOLOGY

## 2022-04-12 PROCEDURE — 87340 HEPATITIS B SURFACE AG IA: CPT | Performed by: OBSTETRICS & GYNECOLOGY

## 2022-04-12 PROCEDURE — 86850 RBC ANTIBODY SCREEN: CPT | Performed by: OBSTETRICS & GYNECOLOGY

## 2022-04-12 NOTE — PROGRESS NOTES
37 y.o.  at 7w2d here for initial OB visit.    Pregnancy complicated by:  AMA  Chronic hypertension on labetalol    Pt has no major complaints today.  Denies vaginal bleeding, leakage of fluid, or pain.    Discuss with pt implications of uncontrolled HTN on pregnancy.  Continue labetalol 100 mg bid.  Continue home BP monitoring TID, parameters to call reviewed.  Discussed daily ASA therapy for prevention of preE starting 12 wks gestation.  Start anatenatal testing as pregnancy progresses.  Timing of delivery will depend on BP control and maternal/fetal status.  Encourage healthy lifestyle modifications.  Refer to MFM as pregnancy progresses.    Discussed implications of AMA on pregnancy.    Pt is not interested in first trimester screening at this time.    Order initial OB lab profile.  Refer to MFM for dating US.  VSCAN today shows early gestational sac with early fetal pole.    Discussed Connective MOM program.  Pt has rec'd Covid vaccine.    Principles of prenatal care and precautions reviewed.  Prenatal vitamins.  Return 4 wks or sooner prn.  All questions answered, voiced understanding.      Julio Cesar Elias MD

## 2022-04-13 LAB
RUBV IGG SER-ACNC: 47.4 IU/ML
RUBV IGG SER-IMP: REACTIVE

## 2022-04-14 LAB
HBV SURFACE AG SERPL QL IA: NEGATIVE
HCV AB SERPL QL IA: NEGATIVE
HIV 1+2 AB+HIV1 P24 AG SERPL QL IA: NEGATIVE
RPR SER QL: NORMAL

## 2022-04-19 ENCOUNTER — PATIENT MESSAGE (OUTPATIENT)
Dept: MATERNAL FETAL MEDICINE | Facility: CLINIC | Age: 37
End: 2022-04-19
Payer: MEDICAID

## 2022-04-20 ENCOUNTER — PROCEDURE VISIT (OUTPATIENT)
Dept: MATERNAL FETAL MEDICINE | Facility: CLINIC | Age: 37
End: 2022-04-20
Payer: MEDICAID

## 2022-04-20 DIAGNOSIS — Z3A.01 7 WEEKS GESTATION OF PREGNANCY: ICD-10-CM

## 2022-04-20 DIAGNOSIS — Z36.89 ENCOUNTER FOR FETAL ANATOMIC SURVEY: Primary | ICD-10-CM

## 2022-04-20 PROCEDURE — 76801 PR US, OB <14WKS, TRANSABD, SINGLE GESTATION: ICD-10-PCS | Mod: 26,S$PBB,, | Performed by: OBSTETRICS & GYNECOLOGY

## 2022-04-20 PROCEDURE — 76801 OB US < 14 WKS SINGLE FETUS: CPT | Mod: PBBFAC,PO | Performed by: OBSTETRICS & GYNECOLOGY

## 2022-04-20 PROCEDURE — 76801 OB US < 14 WKS SINGLE FETUS: CPT | Mod: 26,S$PBB,, | Performed by: OBSTETRICS & GYNECOLOGY

## 2022-04-27 DIAGNOSIS — O36.80X0 PREGNANCY WITH UNCERTAIN FETAL VIABILITY, SINGLE OR UNSPECIFIED FETUS: Primary | ICD-10-CM

## 2022-05-04 ENCOUNTER — ROUTINE PRENATAL (OUTPATIENT)
Dept: OBSTETRICS AND GYNECOLOGY | Facility: CLINIC | Age: 37
End: 2022-05-04
Payer: MEDICAID

## 2022-05-04 ENCOUNTER — PROCEDURE VISIT (OUTPATIENT)
Dept: MATERNAL FETAL MEDICINE | Facility: CLINIC | Age: 37
End: 2022-05-04
Payer: MEDICAID

## 2022-05-04 VITALS
SYSTOLIC BLOOD PRESSURE: 130 MMHG | BODY MASS INDEX: 33.7 KG/M2 | WEIGHT: 215.19 LBS | DIASTOLIC BLOOD PRESSURE: 98 MMHG | HEART RATE: 74 BPM

## 2022-05-04 DIAGNOSIS — O02.1 MISSED ABORTION: Primary | ICD-10-CM

## 2022-05-04 DIAGNOSIS — O36.80X0 PREGNANCY WITH UNCERTAIN FETAL VIABILITY, SINGLE OR UNSPECIFIED FETUS: ICD-10-CM

## 2022-05-04 PROCEDURE — 76817 US MFM PROCEDURE (VIEWPOINT): ICD-10-PCS | Mod: 26,S$PBB,, | Performed by: OBSTETRICS & GYNECOLOGY

## 2022-05-04 PROCEDURE — 99213 PR OFFICE/OUTPT VISIT, EST, LEVL III, 20-29 MIN: ICD-10-PCS | Mod: TH,S$PBB,, | Performed by: OBSTETRICS & GYNECOLOGY

## 2022-05-04 PROCEDURE — 76817 TRANSVAGINAL US OBSTETRIC: CPT | Mod: PBBFAC,PO | Performed by: OBSTETRICS & GYNECOLOGY

## 2022-05-04 PROCEDURE — 99999 PR PBB SHADOW E&M-EST. PATIENT-LVL II: ICD-10-PCS | Mod: PBBFAC,,, | Performed by: OBSTETRICS & GYNECOLOGY

## 2022-05-04 PROCEDURE — 99999 PR PBB SHADOW E&M-EST. PATIENT-LVL II: CPT | Mod: PBBFAC,,, | Performed by: OBSTETRICS & GYNECOLOGY

## 2022-05-04 PROCEDURE — 99212 OFFICE O/P EST SF 10 MIN: CPT | Mod: PBBFAC,TH | Performed by: OBSTETRICS & GYNECOLOGY

## 2022-05-04 PROCEDURE — 99213 OFFICE O/P EST LOW 20 MIN: CPT | Mod: TH,S$PBB,, | Performed by: OBSTETRICS & GYNECOLOGY

## 2022-05-04 RX ORDER — MISOPROSTOL 200 UG/1
400 TABLET ORAL EVERY 4 HOURS
Qty: 10 TABLET | Refills: 0 | Status: SHIPPED | OUTPATIENT
Start: 2022-05-04 | End: 2022-06-29

## 2022-05-04 NOTE — PROGRESS NOTES
10w3d here for OB visit.  Pt seen MFM earlier today for US.  US showed missed .  Pt has no major complaints today.  Denies vaginal bleeding or pain.  Ultrasound findings d/w with pt.  Emotional support provided.  We discussed mgt options.  Pt is requesting medical mgt with cytotec.  Pt advised on cytotec use.  Risks, benefits, and alternatives to cytotec discussed.  Discussed that a suction D&C may be necessarily in event of retained products of conception or if she has heavy bleeding.    Strict SAB, anemia precautions.  Parameters to call reviewed.  Go to ED in event of acute bleeding or sxs of anemia.    O POS  Lab Results   Component Value Date    WBC 4.40 2022    HGB 11.7 (L) 2022    HCT 36.2 (L) 2022    MCV 84 2022     2022     Return 2 wks or sooner as needed.  All questions answered, voiced understanding.      Julio Cesar Elias MD

## 2022-05-06 ENCOUNTER — PATIENT MESSAGE (OUTPATIENT)
Dept: ADMINISTRATIVE | Facility: OTHER | Age: 37
End: 2022-05-06
Payer: MEDICAID

## 2022-05-10 ENCOUNTER — PATIENT MESSAGE (OUTPATIENT)
Dept: OBSTETRICS AND GYNECOLOGY | Facility: CLINIC | Age: 37
End: 2022-05-10

## 2022-06-03 ENCOUNTER — OFFICE VISIT (OUTPATIENT)
Dept: OBSTETRICS AND GYNECOLOGY | Facility: CLINIC | Age: 37
End: 2022-06-03
Payer: MEDICAID

## 2022-06-03 VITALS
SYSTOLIC BLOOD PRESSURE: 132 MMHG | BODY MASS INDEX: 35.05 KG/M2 | DIASTOLIC BLOOD PRESSURE: 90 MMHG | WEIGHT: 223.75 LBS

## 2022-06-03 DIAGNOSIS — O03.9 SAB (SPONTANEOUS ABORTION): Primary | ICD-10-CM

## 2022-06-03 DIAGNOSIS — Z30.2 ENCOUNTER FOR FEMALE STERILIZATION PROCEDURE: ICD-10-CM

## 2022-06-03 PROCEDURE — 3008F PR BODY MASS INDEX (BMI) DOCUMENTED: ICD-10-PCS | Mod: CPTII,,, | Performed by: OBSTETRICS & GYNECOLOGY

## 2022-06-03 PROCEDURE — 99999 PR PBB SHADOW E&M-EST. PATIENT-LVL III: ICD-10-PCS | Mod: PBBFAC,,, | Performed by: OBSTETRICS & GYNECOLOGY

## 2022-06-03 PROCEDURE — 99213 OFFICE O/P EST LOW 20 MIN: CPT | Mod: S$PBB,TH,, | Performed by: OBSTETRICS & GYNECOLOGY

## 2022-06-03 PROCEDURE — 1159F MED LIST DOCD IN RCRD: CPT | Mod: CPTII,,, | Performed by: OBSTETRICS & GYNECOLOGY

## 2022-06-03 PROCEDURE — 1160F PR REVIEW ALL MEDS BY PRESCRIBER/CLIN PHARMACIST DOCUMENTED: ICD-10-PCS | Mod: CPTII,,, | Performed by: OBSTETRICS & GYNECOLOGY

## 2022-06-03 PROCEDURE — 3080F PR MOST RECENT DIASTOLIC BLOOD PRESSURE >= 90 MM HG: ICD-10-PCS | Mod: CPTII,,, | Performed by: OBSTETRICS & GYNECOLOGY

## 2022-06-03 PROCEDURE — 3044F PR MOST RECENT HEMOGLOBIN A1C LEVEL <7.0%: ICD-10-PCS | Mod: CPTII,,, | Performed by: OBSTETRICS & GYNECOLOGY

## 2022-06-03 PROCEDURE — 99213 PR OFFICE/OUTPT VISIT, EST, LEVL III, 20-29 MIN: ICD-10-PCS | Mod: S$PBB,TH,, | Performed by: OBSTETRICS & GYNECOLOGY

## 2022-06-03 PROCEDURE — 1159F PR MEDICATION LIST DOCUMENTED IN MEDICAL RECORD: ICD-10-PCS | Mod: CPTII,,, | Performed by: OBSTETRICS & GYNECOLOGY

## 2022-06-03 PROCEDURE — 3075F SYST BP GE 130 - 139MM HG: CPT | Mod: CPTII,,, | Performed by: OBSTETRICS & GYNECOLOGY

## 2022-06-03 PROCEDURE — 1160F RVW MEDS BY RX/DR IN RCRD: CPT | Mod: CPTII,,, | Performed by: OBSTETRICS & GYNECOLOGY

## 2022-06-03 PROCEDURE — 99999 PR PBB SHADOW E&M-EST. PATIENT-LVL III: CPT | Mod: PBBFAC,,, | Performed by: OBSTETRICS & GYNECOLOGY

## 2022-06-03 PROCEDURE — 3044F HG A1C LEVEL LT 7.0%: CPT | Mod: CPTII,,, | Performed by: OBSTETRICS & GYNECOLOGY

## 2022-06-03 PROCEDURE — 3008F BODY MASS INDEX DOCD: CPT | Mod: CPTII,,, | Performed by: OBSTETRICS & GYNECOLOGY

## 2022-06-03 PROCEDURE — 3075F PR MOST RECENT SYSTOLIC BLOOD PRESS GE 130-139MM HG: ICD-10-PCS | Mod: CPTII,,, | Performed by: OBSTETRICS & GYNECOLOGY

## 2022-06-03 PROCEDURE — 99213 OFFICE O/P EST LOW 20 MIN: CPT | Mod: PBBFAC,TH | Performed by: OBSTETRICS & GYNECOLOGY

## 2022-06-03 PROCEDURE — 3080F DIAST BP >= 90 MM HG: CPT | Mod: CPTII,,, | Performed by: OBSTETRICS & GYNECOLOGY

## 2022-06-03 NOTE — PROGRESS NOTES
"[facility]  Ambulatory Clinic  Obstetrics & Gynecology    Visit Date:  6/3/2022    Chief Complaint:  Miscarriage     History of Present Illness:      Pilar Hernandez is 37 y.o.  here for f/u of miscarriage.    Pt was prescribed oral cytotec and reports passing products of conception within 24 hrs of taking cytotec on .    Denies sxs of anemia.    Denies pelvic or abdominal pain.    Pt reports good family supportive, denies depressed mood.    Pt is requesting tubal ligation, pt states "I'm sure, no more babies".    Review of Systems:      GENERAL:  No fever, fatigue, excessive weight gain or loss  HEENT:  No headaches, hearing changes, visual disturbance  RESPIRATORY:  No cough, shortness of breath  CARDIOVASCULAR:  No chest pain, heart palpitations, leg swelling  GASTROINTESTINAL:  No nausea, vomiting, abd pain   GENITOURINARY:  See HPI  HEMATOLOGIC:  No easy bruisability or bleeding   LYMPHATICS:  No enlarged nodes  MUSCULOSKELETAL:  No joint pain or swelling  SKIN:  No rash, lesions, jaundice  NEUROLOGIC:  No dizziness, weakness, syncope  PSYCHIATRIC:  No depression, homicidal/suicidal ideations, anxiety or mood swings    Physical Exam:     BP (!) 132/90   Wt 101.5 kg (223 lb 12.3 oz)   LMP 2022   Breastfeeding No   BMI 35.05 kg/m²   Pulse 60's, Resp rate 18     GENERAL:  No acute distress, well-nourished  HEENT:  Atraumatic, anicteric, moist mucus membranes  LUNGS:  Clear to auscultation  HEART:  Regular rate and rhythm  ABDOMEN:  Soft, non-tender, non-distended, no obvious organomegaly  EXT:  Symmetric w/o cramping, claudication, or edema. +2 distal pulses.  SKIN:  No rashes or bruising  PSYCH:  Mood and affect appropriate    GENITOURINARY:    VULVAR:  Female external genitalia w/o obvious lesions. Normal urethral meatus. No gross lymphadenopathy.   VAGINA:  Pink, moist, well-rugated. Good support. No obvious lesion. No discharge.   CERVIX:  No cervical " motion tenderness, discharge, or obvious lesions. Internal os closed.  UTERUS:  Small, non-tender, normal contour  ADNEXA:  No masses, non-tender    RECTAL:  Declined. No obvious external lesions    Chaperone present for exam.    Lab Results   Component Value Date    WBC 4.40 2022    HGB 11.7 (L) 2022    HCT 36.2 (L) 2022    MCV 84 2022     2022     O POS    Assessment:     Pilar Hernandez is 37 y.o.  with:    1. Spontaneous   2. Multiparity, desires permanent sterilization    Plan:    We discussed her spontaneous .  Pt desires expectant mgt for now.  Pt understands that a suction D&C may be necessarily in event of retained products of conception or if she continues have heavy bleeding.  SAB precautions.    Discussed contraceptive options.  Pt desires BTL.  Risks, benefits, and alternatives to BTL discussed.  Schedule surgery pending surgery authorization.    Will schedule pt for preop, or return sooner as needed.  All questions answered, pt voiced understanding.        Julio Cesar Elias MD

## 2022-06-08 ENCOUNTER — TELEPHONE (OUTPATIENT)
Dept: FAMILY MEDICINE | Facility: CLINIC | Age: 37
End: 2022-06-08
Payer: MEDICAID

## 2022-06-10 DIAGNOSIS — B37.31 VAGINAL YEAST INFECTION: Primary | ICD-10-CM

## 2022-06-10 RX ORDER — FLUCONAZOLE 150 MG/1
150 TABLET ORAL ONCE AS NEEDED
Qty: 6 TABLET | Refills: 0 | Status: SHIPPED | OUTPATIENT
Start: 2022-06-10 | End: 2022-06-10

## 2022-06-29 ENCOUNTER — OFFICE VISIT (OUTPATIENT)
Dept: OBSTETRICS AND GYNECOLOGY | Facility: CLINIC | Age: 37
End: 2022-06-29
Payer: MEDICAID

## 2022-06-29 ENCOUNTER — HOSPITAL ENCOUNTER (OUTPATIENT)
Dept: PREADMISSION TESTING | Facility: HOSPITAL | Age: 37
Discharge: HOME | End: 2022-06-29
Attending: OBSTETRICS & GYNECOLOGY
Payer: MEDICAID

## 2022-06-29 VITALS
SYSTOLIC BLOOD PRESSURE: 130 MMHG | HEIGHT: 67 IN | WEIGHT: 223.31 LBS | DIASTOLIC BLOOD PRESSURE: 98 MMHG | BODY MASS INDEX: 35.05 KG/M2

## 2022-06-29 VITALS
HEART RATE: 71 BPM | WEIGHT: 223.56 LBS | DIASTOLIC BLOOD PRESSURE: 92 MMHG | BODY MASS INDEX: 35.09 KG/M2 | TEMPERATURE: 97 F | SYSTOLIC BLOOD PRESSURE: 136 MMHG | RESPIRATION RATE: 18 BRPM | OXYGEN SATURATION: 99 % | HEIGHT: 67 IN

## 2022-06-29 DIAGNOSIS — Z01.818 PREOPERATIVE TESTING: Primary | ICD-10-CM

## 2022-06-29 DIAGNOSIS — Z30.2 ENCOUNTER FOR FEMALE STERILIZATION PROCEDURE: ICD-10-CM

## 2022-06-29 LAB
ALBUMIN SERPL BCP-MCNC: 3.7 G/DL (ref 3.5–5.2)
ALP SERPL-CCNC: 50 U/L (ref 55–135)
ALT SERPL W/O P-5'-P-CCNC: 9 U/L (ref 10–44)
ANION GAP SERPL CALC-SCNC: 6 MMOL/L (ref 8–16)
AST SERPL-CCNC: 13 U/L (ref 10–40)
BASOPHILS # BLD AUTO: 0.04 K/UL (ref 0–0.2)
BASOPHILS NFR BLD: 1 % (ref 0–1.9)
BILIRUB SERPL-MCNC: 0.4 MG/DL (ref 0.1–1)
BUN SERPL-MCNC: 13 MG/DL (ref 6–20)
CALCIUM SERPL-MCNC: 8.9 MG/DL (ref 8.7–10.5)
CHLORIDE SERPL-SCNC: 106 MMOL/L (ref 95–110)
CO2 SERPL-SCNC: 30 MMOL/L (ref 23–29)
CREAT SERPL-MCNC: 0.8 MG/DL (ref 0.5–1.4)
DIFFERENTIAL METHOD: ABNORMAL
EOSINOPHIL # BLD AUTO: 0.1 K/UL (ref 0–0.5)
EOSINOPHIL NFR BLD: 3.6 % (ref 0–8)
ERYTHROCYTE [DISTWIDTH] IN BLOOD BY AUTOMATED COUNT: 13.9 % (ref 11.5–14.5)
EST. GFR  (AFRICAN AMERICAN): >60 ML/MIN/1.73 M^2
EST. GFR  (NON AFRICAN AMERICAN): >60 ML/MIN/1.73 M^2
GLUCOSE SERPL-MCNC: 87 MG/DL (ref 70–110)
HCT VFR BLD AUTO: 36.4 % (ref 37–48.5)
HGB BLD-MCNC: 11.6 G/DL (ref 12–16)
IMM GRANULOCYTES # BLD AUTO: 0.01 K/UL (ref 0–0.04)
IMM GRANULOCYTES NFR BLD AUTO: 0.3 % (ref 0–0.5)
LYMPHOCYTES # BLD AUTO: 1.7 K/UL (ref 1–4.8)
LYMPHOCYTES NFR BLD: 44.6 % (ref 18–48)
MCH RBC QN AUTO: 26.1 PG (ref 27–31)
MCHC RBC AUTO-ENTMCNC: 31.9 G/DL (ref 32–36)
MCV RBC AUTO: 82 FL (ref 82–98)
MONOCYTES # BLD AUTO: 0.3 K/UL (ref 0.3–1)
MONOCYTES NFR BLD: 7.5 % (ref 4–15)
NEUTROPHILS # BLD AUTO: 1.7 K/UL (ref 1.8–7.7)
NEUTROPHILS NFR BLD: 43 % (ref 38–73)
NRBC BLD-RTO: 0 /100 WBC
PLATELET # BLD AUTO: 193 K/UL (ref 150–450)
PMV BLD AUTO: 10.8 FL (ref 9.2–12.9)
POTASSIUM SERPL-SCNC: 3.5 MMOL/L (ref 3.5–5.1)
PROT SERPL-MCNC: 7.2 G/DL (ref 6–8.4)
RBC # BLD AUTO: 4.44 M/UL (ref 4–5.4)
SODIUM SERPL-SCNC: 142 MMOL/L (ref 136–145)
WBC # BLD AUTO: 3.88 K/UL (ref 3.9–12.7)

## 2022-06-29 PROCEDURE — 93010 ELECTROCARDIOGRAM REPORT: CPT | Mod: ,,, | Performed by: INTERNAL MEDICINE

## 2022-06-29 PROCEDURE — 3080F PR MOST RECENT DIASTOLIC BLOOD PRESSURE >= 90 MM HG: ICD-10-PCS | Mod: CPTII,,, | Performed by: OBSTETRICS & GYNECOLOGY

## 2022-06-29 PROCEDURE — 3008F BODY MASS INDEX DOCD: CPT | Mod: CPTII,,, | Performed by: OBSTETRICS & GYNECOLOGY

## 2022-06-29 PROCEDURE — 99999 PR PBB SHADOW E&M-EST. PATIENT-LVL III: CPT | Mod: PBBFAC,,, | Performed by: OBSTETRICS & GYNECOLOGY

## 2022-06-29 PROCEDURE — 3008F PR BODY MASS INDEX (BMI) DOCUMENTED: ICD-10-PCS | Mod: CPTII,,, | Performed by: OBSTETRICS & GYNECOLOGY

## 2022-06-29 PROCEDURE — 3075F SYST BP GE 130 - 139MM HG: CPT | Mod: CPTII,,, | Performed by: OBSTETRICS & GYNECOLOGY

## 2022-06-29 PROCEDURE — 93010 EKG 12-LEAD: ICD-10-PCS | Mod: ,,, | Performed by: INTERNAL MEDICINE

## 2022-06-29 PROCEDURE — 80053 COMPREHEN METABOLIC PANEL: CPT | Performed by: OBSTETRICS & GYNECOLOGY

## 2022-06-29 PROCEDURE — 99499 NO LOS: ICD-10-PCS | Mod: S$PBB,,, | Performed by: OBSTETRICS & GYNECOLOGY

## 2022-06-29 PROCEDURE — 3044F HG A1C LEVEL LT 7.0%: CPT | Mod: CPTII,,, | Performed by: OBSTETRICS & GYNECOLOGY

## 2022-06-29 PROCEDURE — 85025 COMPLETE CBC W/AUTO DIFF WBC: CPT | Performed by: OBSTETRICS & GYNECOLOGY

## 2022-06-29 PROCEDURE — 93005 ELECTROCARDIOGRAM TRACING: CPT

## 2022-06-29 PROCEDURE — 3044F PR MOST RECENT HEMOGLOBIN A1C LEVEL <7.0%: ICD-10-PCS | Mod: CPTII,,, | Performed by: OBSTETRICS & GYNECOLOGY

## 2022-06-29 PROCEDURE — 3080F DIAST BP >= 90 MM HG: CPT | Mod: CPTII,,, | Performed by: OBSTETRICS & GYNECOLOGY

## 2022-06-29 PROCEDURE — 3075F PR MOST RECENT SYSTOLIC BLOOD PRESS GE 130-139MM HG: ICD-10-PCS | Mod: CPTII,,, | Performed by: OBSTETRICS & GYNECOLOGY

## 2022-06-29 PROCEDURE — 99213 OFFICE O/P EST LOW 20 MIN: CPT | Mod: PBBFAC | Performed by: OBSTETRICS & GYNECOLOGY

## 2022-06-29 PROCEDURE — 99999 PR PBB SHADOW E&M-EST. PATIENT-LVL III: ICD-10-PCS | Mod: PBBFAC,,, | Performed by: OBSTETRICS & GYNECOLOGY

## 2022-06-29 PROCEDURE — 99499 UNLISTED E&M SERVICE: CPT | Mod: S$PBB,,, | Performed by: OBSTETRICS & GYNECOLOGY

## 2022-06-29 RX ORDER — SODIUM CHLORIDE 9 MG/ML
INJECTION, SOLUTION INTRAVENOUS CONTINUOUS
Status: CANCELLED | OUTPATIENT
Start: 2022-06-29

## 2022-06-29 RX ORDER — MUPIROCIN 20 MG/G
OINTMENT TOPICAL
Status: CANCELLED | OUTPATIENT
Start: 2022-06-29

## 2022-06-29 RX ORDER — FLUCONAZOLE 150 MG/1
TABLET ORAL
COMMUNITY
Start: 2022-06-10 | End: 2022-09-21

## 2022-06-29 NOTE — DISCHARGE INSTRUCTIONS
Before 7 AM, enter through the Emergency Entrance..   After 7 AM enter through the Main Entrance.      Your procedure  is scheduled for __7/8/22________.    Call [phone] between 2pm and 5pm on _7/7/22______to find out your arrival time for the day of surgery.    You may use the main entrance to the hospital on the [facility] side, or the entrance that is next to the [facility].    You may have two visitors.  Visiting hours for non-COVID-19 patients expanded to 24/7 (still restricted to one visitor)  Youth visitation changed from age 18 to age 12.      You will be going to the Same Day Surgery Unit on the 2nd floor of the hospital.    Important instructions:  Do not eat anything after midnight.  You may have plain water, non carbonated.  You may also have Gatorade or Powerade after midnight.    Stop all fluids 2 hours before your surgery.    It is okay to brush your teeth.  Do not have gum, candy or mints.    SEE MEDICATION SHEET.   TAKE MEDICATIONS AS DIRECTED WITH SIPS OF WATER.    STOP taking Aspirin, Ibuprofen,  Advil, Motrin, Mobic(meloxicam), Aleve (naproxen), Fish oil, and Vitamin E for at least 7 days before your surgery.     You may take Tylenol if needed which is not a blood thinner.    Please shower the night before and the morning of your surgery.      Follow any Prep Instructions given by your surgeon.      Use Hibiclens soap as instructed by your pre op nurse.   Please place clean linens on your bed the night before surgery. Please wear fresh clean clothing after each shower.    No shaving of procedural area at least 4-5 days before surgery due to increased risk of skin irritation and/or possible infection.      Contact lenses and removable denture work may not be worn during your procedure.    You may wear deodorant only. If you are having breast surgery, do not wear deodorant on the operative side.    Do not wear powder, body lotion, perfume/cologne or make-up.    Do not wear any jewelry  or have any metal on your body.    You will be asked to remove any dentures or partials for the procedure.    If you are going home on the same day of surgery, you must arrange for a family member or a friend to drive you home.  Public transportation is prohibited.  You will not be able to drive home if you were given anesthesia or sedation.    Patients who want to have their Post-op prescriptions filled from our in-house [facility], bring a Credit/Debit Card  or cash with you. A co-pay may be required.  The pharmacy closes at 5:30 pm.    Children under 18 years of age require a parent/guardian present the entire time that they are here.    Wear loose fitting clothes allowing for bandages.    Please leave money and valuables home.      You may bring your cell phone.    Call the doctor if fever or illness should occur before your surgery.    Call [phone] to contact us here if needed.

## 2022-09-21 ENCOUNTER — OFFICE VISIT (OUTPATIENT)
Dept: OBSTETRICS AND GYNECOLOGY | Facility: CLINIC | Age: 37
End: 2022-09-21
Payer: MEDICAID

## 2022-09-21 VITALS
BODY MASS INDEX: 34.78 KG/M2 | DIASTOLIC BLOOD PRESSURE: 100 MMHG | SYSTOLIC BLOOD PRESSURE: 160 MMHG | WEIGHT: 221.56 LBS | HEIGHT: 67 IN

## 2022-09-21 DIAGNOSIS — Z30.09 FAMILY PLANNING COUNSELING: Primary | ICD-10-CM

## 2022-09-21 DIAGNOSIS — I10 HYPERTENSION, ESSENTIAL, BENIGN: ICD-10-CM

## 2022-09-21 PROCEDURE — 1160F PR REVIEW ALL MEDS BY PRESCRIBER/CLIN PHARMACIST DOCUMENTED: ICD-10-PCS | Mod: CPTII,,, | Performed by: OBSTETRICS & GYNECOLOGY

## 2022-09-21 PROCEDURE — 99213 OFFICE O/P EST LOW 20 MIN: CPT | Mod: S$PBB,,, | Performed by: OBSTETRICS & GYNECOLOGY

## 2022-09-21 PROCEDURE — 3077F SYST BP >= 140 MM HG: CPT | Mod: CPTII,,, | Performed by: OBSTETRICS & GYNECOLOGY

## 2022-09-21 PROCEDURE — 3080F DIAST BP >= 90 MM HG: CPT | Mod: CPTII,,, | Performed by: OBSTETRICS & GYNECOLOGY

## 2022-09-21 PROCEDURE — 3008F BODY MASS INDEX DOCD: CPT | Mod: CPTII,,, | Performed by: OBSTETRICS & GYNECOLOGY

## 2022-09-21 PROCEDURE — 99213 PR OFFICE/OUTPT VISIT, EST, LEVL III, 20-29 MIN: ICD-10-PCS | Mod: S$PBB,,, | Performed by: OBSTETRICS & GYNECOLOGY

## 2022-09-21 PROCEDURE — 1160F RVW MEDS BY RX/DR IN RCRD: CPT | Mod: CPTII,,, | Performed by: OBSTETRICS & GYNECOLOGY

## 2022-09-21 PROCEDURE — 3080F PR MOST RECENT DIASTOLIC BLOOD PRESSURE >= 90 MM HG: ICD-10-PCS | Mod: CPTII,,, | Performed by: OBSTETRICS & GYNECOLOGY

## 2022-09-21 PROCEDURE — 99213 OFFICE O/P EST LOW 20 MIN: CPT | Mod: PBBFAC | Performed by: OBSTETRICS & GYNECOLOGY

## 2022-09-21 PROCEDURE — 1159F PR MEDICATION LIST DOCUMENTED IN MEDICAL RECORD: ICD-10-PCS | Mod: CPTII,,, | Performed by: OBSTETRICS & GYNECOLOGY

## 2022-09-21 PROCEDURE — 3008F PR BODY MASS INDEX (BMI) DOCUMENTED: ICD-10-PCS | Mod: CPTII,,, | Performed by: OBSTETRICS & GYNECOLOGY

## 2022-09-21 PROCEDURE — 3044F HG A1C LEVEL LT 7.0%: CPT | Mod: CPTII,,, | Performed by: OBSTETRICS & GYNECOLOGY

## 2022-09-21 PROCEDURE — 99999 PR PBB SHADOW E&M-EST. PATIENT-LVL III: ICD-10-PCS | Mod: PBBFAC,,, | Performed by: OBSTETRICS & GYNECOLOGY

## 2022-09-21 PROCEDURE — 1159F MED LIST DOCD IN RCRD: CPT | Mod: CPTII,,, | Performed by: OBSTETRICS & GYNECOLOGY

## 2022-09-21 PROCEDURE — 99999 PR PBB SHADOW E&M-EST. PATIENT-LVL III: CPT | Mod: PBBFAC,,, | Performed by: OBSTETRICS & GYNECOLOGY

## 2022-09-21 PROCEDURE — 3044F PR MOST RECENT HEMOGLOBIN A1C LEVEL <7.0%: ICD-10-PCS | Mod: CPTII,,, | Performed by: OBSTETRICS & GYNECOLOGY

## 2022-09-21 PROCEDURE — 3077F PR MOST RECENT SYSTOLIC BLOOD PRESSURE >= 140 MM HG: ICD-10-PCS | Mod: CPTII,,, | Performed by: OBSTETRICS & GYNECOLOGY

## 2022-09-21 RX ORDER — NIFEDIPINE 90 MG/1
TABLET, EXTENDED RELEASE ORAL
COMMUNITY
Start: 2022-09-05 | End: 2022-11-29 | Stop reason: CLARIF

## 2022-09-21 RX ORDER — NEOMYCIN SULFATE, POLYMYXIN B SULFATE, HYDROCORTISONE 3.5; 10000; 1 MG/ML; [USP'U]/ML; MG/ML
SOLUTION/ DROPS AURICULAR (OTIC)
Status: ON HOLD | COMMUNITY
Start: 2022-09-08 | End: 2022-12-02 | Stop reason: HOSPADM

## 2022-09-21 RX ORDER — VALSARTAN AND HYDROCHLOROTHIAZIDE 160; 12.5 MG/1; MG/1
1 TABLET, FILM COATED ORAL DAILY
Qty: 30 TABLET | Refills: 0 | Status: SHIPPED | OUTPATIENT
Start: 2022-09-21 | End: 2022-12-06

## 2022-09-21 RX ORDER — IBUPROFEN 800 MG/1
TABLET ORAL
Status: ON HOLD | COMMUNITY
Start: 2022-09-08 | End: 2022-12-02 | Stop reason: HOSPADM

## 2022-09-21 NOTE — PROGRESS NOTES
"[facility]  Ambulatory Clinic  Obstetrics & Gynecology    Visit Date:  2022    Chief Complaint:  Birth control    History of Present Illness:      Pilar Hernandez is a 37 y.o.  here to discuss contraceptive options.    Pt is requesting BTL.    Pt is currently on Ortho Evra birth control patch.       Pt has no major complaints today.      Pt denies abnormal vaginal bleeding, vaginal discharge, dysmenorrhea, dyspareunia, pelvic pain, breast mass/skin changes, incontinence, GI or urinary complaints.      Review of Systems:      GENERAL:  No fever, fatigue, excessive weight gain or loss  HEENT:  No headaches, hearing changes, visual disturbance  RESPIRATORY:  No cough, shortness of breath  CARDIOVASCULAR:  No chest pain, heart palpitations, leg swelling  BREAST:  No lump, pain, nipple discharge, skin changes  GASTROINTESTINAL:  No nausea, vomiting, constipation, diarrhea, abd pain, rectal bleeding   GENITOURINARY:  See HPI    Physical Exam:     BP (!) 160/100   Ht 5' 7" (1.702 m)   Wt 100.5 kg (221 lb 9 oz)   LMP 2022   BMI 34.70 kg/m²   Pulse 60's, Resp rate 14     GENERAL:  No acute distress, well-nourished  HEENT:  Atraumatic, anicteric, moist mucus membranes  LUNGS:  Clear normal respiratory effort  HEART:  Regular rate and rhythm  ABDOMEN:  Soft, non-tender, non-distended, no obvious organomegaly  EXT:  Symmetric w/o cramping, claudication, or edema. +2 distal pulses.  SKIN:  No rashes or bruising  PSYCH:  Mood and affect appropriate  NEURO:  Grossly intact bilaterally   PELVIC:  Pt declined.    Chaperone present for exam.    Assessment:     37 y.o. :    Family planning - desires BTL    Plan:    Discussed contraceptive options.  After an extensive discussion, pt desires BTL.  Risks, benefits, and alternatives to BTL discussed.  Continue Ortho Evra birth control patch in meantime.  Will schedule pre-op pending OR scheduling.    Pt is requesting a refill " of DIOVAN-HCT until she can see her PCP.  Encourage healthy lifestyle modifications.    Return for pre-op pending OR scheduling.  All questions answered, pt voiced understanding.        Julio Cesar Elias MD

## 2022-09-23 ENCOUNTER — TELEPHONE (OUTPATIENT)
Dept: OBSTETRICS AND GYNECOLOGY | Facility: CLINIC | Age: 37
End: 2022-09-23
Payer: MEDICAID

## 2022-09-23 NOTE — TELEPHONE ENCOUNTER
Pharmacy notified [facility] does not require prior auth for rx. Pharmacy advised rx was transferred to another pharmacy. They will confirm with other pharmacy.

## 2022-09-30 ENCOUNTER — TELEPHONE (OUTPATIENT)
Dept: OBSTETRICS AND GYNECOLOGY | Facility: CLINIC | Age: 37
End: 2022-09-30
Payer: MEDICAID

## 2022-09-30 DIAGNOSIS — Z30.2 ENCOUNTER FOR FEMALE STERILIZATION PROCEDURE: Primary | ICD-10-CM

## 2022-10-25 ENCOUNTER — PATIENT MESSAGE (OUTPATIENT)
Dept: ADMINISTRATIVE | Facility: OTHER | Age: 37
End: 2022-10-25
Payer: MEDICAID

## 2022-11-02 ENCOUNTER — ANESTHESIA EVENT (OUTPATIENT)
Dept: SURGERY | Facility: HOSPITAL | Age: 37
End: 2022-11-02
Payer: MEDICAID

## 2022-11-10 ENCOUNTER — PATIENT MESSAGE (OUTPATIENT)
Dept: OBSTETRICS AND GYNECOLOGY | Facility: CLINIC | Age: 37
End: 2022-11-10
Payer: MEDICAID

## 2022-11-17 ENCOUNTER — OFFICE VISIT (OUTPATIENT)
Dept: OBSTETRICS AND GYNECOLOGY | Facility: CLINIC | Age: 37
End: 2022-11-17
Payer: MEDICAID

## 2022-11-17 VITALS — WEIGHT: 225 LBS | SYSTOLIC BLOOD PRESSURE: 118 MMHG | DIASTOLIC BLOOD PRESSURE: 88 MMHG | BODY MASS INDEX: 35.24 KG/M2

## 2022-11-17 DIAGNOSIS — Z01.818 PREOP EXAMINATION: Primary | ICD-10-CM

## 2022-11-17 DIAGNOSIS — Z30.2 ENCOUNTER FOR FEMALE STERILIZATION PROCEDURE: ICD-10-CM

## 2022-11-17 PROCEDURE — 3079F PR MOST RECENT DIASTOLIC BLOOD PRESSURE 80-89 MM HG: ICD-10-PCS | Mod: CPTII,,, | Performed by: OBSTETRICS & GYNECOLOGY

## 2022-11-17 PROCEDURE — 3044F HG A1C LEVEL LT 7.0%: CPT | Mod: CPTII,,, | Performed by: OBSTETRICS & GYNECOLOGY

## 2022-11-17 PROCEDURE — 3074F PR MOST RECENT SYSTOLIC BLOOD PRESSURE < 130 MM HG: ICD-10-PCS | Mod: CPTII,,, | Performed by: OBSTETRICS & GYNECOLOGY

## 2022-11-17 PROCEDURE — 99499 NO LOS: ICD-10-PCS | Mod: S$PBB,,, | Performed by: OBSTETRICS & GYNECOLOGY

## 2022-11-17 PROCEDURE — 1159F MED LIST DOCD IN RCRD: CPT | Mod: CPTII,,, | Performed by: OBSTETRICS & GYNECOLOGY

## 2022-11-17 PROCEDURE — 3074F SYST BP LT 130 MM HG: CPT | Mod: CPTII,,, | Performed by: OBSTETRICS & GYNECOLOGY

## 2022-11-17 PROCEDURE — 99213 OFFICE O/P EST LOW 20 MIN: CPT | Mod: PBBFAC | Performed by: OBSTETRICS & GYNECOLOGY

## 2022-11-17 PROCEDURE — 3008F PR BODY MASS INDEX (BMI) DOCUMENTED: ICD-10-PCS | Mod: CPTII,,, | Performed by: OBSTETRICS & GYNECOLOGY

## 2022-11-17 PROCEDURE — 3044F PR MOST RECENT HEMOGLOBIN A1C LEVEL <7.0%: ICD-10-PCS | Mod: CPTII,,, | Performed by: OBSTETRICS & GYNECOLOGY

## 2022-11-17 PROCEDURE — 3008F BODY MASS INDEX DOCD: CPT | Mod: CPTII,,, | Performed by: OBSTETRICS & GYNECOLOGY

## 2022-11-17 PROCEDURE — 99999 PR PBB SHADOW E&M-EST. PATIENT-LVL III: CPT | Mod: PBBFAC,,, | Performed by: OBSTETRICS & GYNECOLOGY

## 2022-11-17 PROCEDURE — 3079F DIAST BP 80-89 MM HG: CPT | Mod: CPTII,,, | Performed by: OBSTETRICS & GYNECOLOGY

## 2022-11-17 PROCEDURE — 1159F PR MEDICATION LIST DOCUMENTED IN MEDICAL RECORD: ICD-10-PCS | Mod: CPTII,,, | Performed by: OBSTETRICS & GYNECOLOGY

## 2022-11-17 PROCEDURE — 99499 UNLISTED E&M SERVICE: CPT | Mod: S$PBB,,, | Performed by: OBSTETRICS & GYNECOLOGY

## 2022-11-17 PROCEDURE — 99999 PR PBB SHADOW E&M-EST. PATIENT-LVL III: ICD-10-PCS | Mod: PBBFAC,,, | Performed by: OBSTETRICS & GYNECOLOGY

## 2022-11-17 RX ORDER — MUPIROCIN 20 MG/G
OINTMENT TOPICAL
Status: CANCELLED | OUTPATIENT
Start: 2022-11-17

## 2022-11-17 RX ORDER — SODIUM CHLORIDE 9 MG/ML
INJECTION, SOLUTION INTRAVENOUS CONTINUOUS
Status: CANCELLED | OUTPATIENT
Start: 2022-11-17

## 2022-11-17 NOTE — PROGRESS NOTES
Pt here for pre-op for laparoscopic BTL.  Please see pre-op H&P for complete details.    Julio Cesar Elias MD

## 2022-11-26 PROBLEM — Z87.59 MISCARRIAGE WITHIN LAST 12 MONTHS: Status: RESOLVED | Noted: 2022-03-28 | Resolved: 2022-11-26

## 2022-11-27 NOTE — H&P (VIEW-ONLY)
[facility]  History & Physical  Obstetrics & Gynecology    Visit Date:  2022    Chief Complaint:  Pre-op for bilateral tubal ligation    History of Present Illness:      Pilar Hernandez is a 37 y.o.  here for pre-op for a bilateral tubal ligation secondary to pt wish for permanent sterilization.    Pt has completed her family planning and is resolute in her decision to proceed with surgical permanent sterilization, and has declined medical management and other conservative therapies.    Past Medical History:      Diagnosis Date    Anemia     Hypertension      Past Surgical History:      Procedure Laterality Date    verticle  sleeve      patient lost 109# Dr. Carlos Bowles      Medications:     Current Outpatient Medications on File Prior to Visit   Medication Sig Dispense Refill    valsartan-hydrochlorothiazide (DIOVAN-HCT) 160-12.5 mg per tablet Take 1 tablet by mouth once daily. 30 tablet 0    ZAFEMY 150-35 mcg/24 hr Place 1 patch onto the skin once a week for 3 weeks each month and off one week. 9 patch 3    ibuprofen (ADVIL,MOTRIN) 800 MG tablet take 1 TABLET(S) BY MOUTH EVERY 6 HOURS AS NEEDED for pain WITH FOOD      neomycin-polymyxin-hydrocortisone (CORTISPORIN) otic solution instill 3 drops in left ear FOUR TIMES DAILY for 7 days      NIFEdipine (PROCARDIA-XL) 90 MG (OSM) 24 hr tablet       nystatin-triamcinolone (MYCOLOG II) cream Apply topically 2 (two) times daily. (Patient not taking: No sig reported) 60 g 2     Allergies:     NKDA      Gynecologic History:      Periods are regular, not heavy or painful.  Denies active STI      Social History:      Denies tobacco, alcohol abuse or illicit drug use  Denies domestic abuse     Family History:       Problem Relation Age of Onset    Diabetes Mother     Hypertension Mother      Review of Systems:      GENERAL:  No fever, fatigue, excessive weight gain or loss  HEENT:  No head injury, headaches, hearing changes,  visual disturbance  RESPIRATORY:  No cough, shortness of breath  CARDIOVASCULAR:  No chest pain, heart palpitations, leg swelling  GASTROINTESTINAL:  No nausea, vomiting, constipation, diarrhea, abd pain, rectal bleeding   GENITOURINARY:  See HPI.  HEMATOLOGIC:  No easy bruisability or bleeding   LYMPHATICS:  No enlarged nodes  MUSCULOSKELETAL:  No joint pain or swelling  SKIN:  No rash, lesions, jaundice  NEUROLOGIC:  No dizziness, weakness, syncope  PSYCHIATRIC:  No depression, anxiety or mood swings     Physical Exam:     /88   Wt 102 kg (224 lb 15.7 oz)   LMP 11/10/2022 (Exact Date)   Breastfeeding Unknown   BMI 35.24 kg/m²   Pulse 60;s, Resp rate 18    GENERAL: NAD, well-nourished  HEENT: NCAT, moist mucus membranes, anicteric, neck supple  LUNGS: Clear normal respiratory effort  HEART: Regular rate and rhythm no m/g/r  ABDOMEN: Soft, non-tender, normoactive BS, no obvious organomegaly.  EXT: Symmetric w/o cramping, claudication, or edema. +2 distal pulses.  SKIN: No rashes or bruising  NEURO: Grossly intact bilaterally  PSYCH: Mood and affect appropriate    PELVIC: Female external genitalia w/o any obvious lesions. Normal urethral meatus. No gross lymphadenopathy.   Vagina: Pink, moist, well-rugated. Good support. No obvious lesion. No discharge noted.   Cervix: No cervical motion tenderness, discharge, or obvious lesions.   Uterus: Small, non-tender, normal contour.  Adnexa: No masses, non-tender   Rectal: Declined. No obvious external lesions    Assessment:     37 y.o.  here for pre-op for bilateral tubal ligation secondary to pt wish for permanent sterilization.    Plan:    We discussed contraceptive options in great detail.  After an extensive discussion, pt is resolute in her decision to proceed with bilateral tubal ligation.  Pt has declined medical management or other conservative therapies.  The proposed procedure will be a laparoscopic bilateral tubal ligation by any means (including  but not limited to cut and tie, salpingectomy, fulguration, silastic ring and/or Filshie clips).    Pt was informed of the risks, benefits, and alternatives of a laparoscopic tubal ligation including failure rates (0.25 - 8/1000 failure rate depending on final technique and timing of procedure).  Risks included but were not limited to future infertility/sterilization, bleeding, infection, injury to surrounding organs or tissues, injury to bladder and/or urinary tract, injury to bowel and/or intestinal obstruction, damage to major blood vessels, hemorrhage requiring transfusion of blood products, damage to ovaries requiring hormone administration, pulmonary embolism, fistula formation, urinary and/or fecal incontinence, sexual dysfunction/pain, chronic pain, hernia, failure of wound to heal, permanent/disfiguring scarring, and even death.  Pt was counseled that a tubal ligation may not be feasible if extensive pelvic adhesion was encountered at time of surgery and the surgery may be terminated at surgeon's discretion for outpatient follow up to discuss other family planning options or future sterilization procedure.  Furthermore, pt was also counseled on the possibility of laparotomy if extensive adhesions or bleeding were encountered for the completion of the tubal ligation at the surgeon's discretion.  In the event of a hysterectomy due to surgical complications from the tubal ligation, pt was counseled extensively on the inability to become pregnant and expressed an understanding of this.  Pt was also counseled on the need for long term follow-up with pap smears and the possibility of future trachelectomy in the event of a supracervical hysterectomy.  In the event of a oophorectomy due to ovarian injury, pt was also counseled that a bilateral oophorectomy will result in surgical menopause for pre/perimenopausal women and possible risks/benefits associated with hormone replacement therapy.  The risks, benefits, and  alternatives to blood transfusion was also discussed.  Pt expressed an understanding of risks involved, all questions answered, and informed consent was obtained for surgery and blood transfusion.    Surgery tentatively scheduled for 12/2/2022.    Pre-op instructions reviewed.      All questions answered, pt voiced understanding.        Julio Cesar Elias MD

## 2022-11-29 ENCOUNTER — HOSPITAL ENCOUNTER (OUTPATIENT)
Dept: PREADMISSION TESTING | Facility: HOSPITAL | Age: 37
Discharge: HOME | End: 2022-11-29
Attending: OBSTETRICS & GYNECOLOGY
Payer: MEDICAID

## 2022-11-29 VITALS
OXYGEN SATURATION: 99 % | DIASTOLIC BLOOD PRESSURE: 88 MMHG | HEART RATE: 74 BPM | HEIGHT: 67 IN | WEIGHT: 226.88 LBS | BODY MASS INDEX: 35.61 KG/M2 | SYSTOLIC BLOOD PRESSURE: 125 MMHG | TEMPERATURE: 97 F | RESPIRATION RATE: 18 BRPM

## 2022-11-29 DIAGNOSIS — Z01.818 PREOP TESTING: Primary | ICD-10-CM

## 2022-11-29 LAB
ABO + RH BLD: NORMAL
BASOPHILS # BLD AUTO: 0.05 K/UL (ref 0–0.2)
BASOPHILS NFR BLD: 0.8 % (ref 0–1.9)
BLD GP AB SCN CELLS X3 SERPL QL: NORMAL
DIFFERENTIAL METHOD: ABNORMAL
EOSINOPHIL # BLD AUTO: 0.1 K/UL (ref 0–0.5)
EOSINOPHIL NFR BLD: 1.6 % (ref 0–8)
ERYTHROCYTE [DISTWIDTH] IN BLOOD BY AUTOMATED COUNT: 13.6 % (ref 11.5–14.5)
HCT VFR BLD AUTO: 36.2 % (ref 37–48.5)
HGB BLD-MCNC: 11.5 G/DL (ref 12–16)
IMM GRANULOCYTES # BLD AUTO: 0.01 K/UL (ref 0–0.04)
IMM GRANULOCYTES NFR BLD AUTO: 0.2 % (ref 0–0.5)
LYMPHOCYTES # BLD AUTO: 1.7 K/UL (ref 1–4.8)
LYMPHOCYTES NFR BLD: 27.9 % (ref 18–48)
MCH RBC QN AUTO: 25.7 PG (ref 27–31)
MCHC RBC AUTO-ENTMCNC: 31.8 G/DL (ref 32–36)
MCV RBC AUTO: 81 FL (ref 82–98)
MONOCYTES # BLD AUTO: 0.4 K/UL (ref 0.3–1)
MONOCYTES NFR BLD: 5.9 % (ref 4–15)
NEUTROPHILS # BLD AUTO: 3.9 K/UL (ref 1.8–7.7)
NEUTROPHILS NFR BLD: 63.6 % (ref 38–73)
NRBC BLD-RTO: 0 /100 WBC
PLATELET # BLD AUTO: 254 K/UL (ref 150–450)
PMV BLD AUTO: 10.7 FL (ref 9.2–12.9)
RBC # BLD AUTO: 4.47 M/UL (ref 4–5.4)
WBC # BLD AUTO: 6.14 K/UL (ref 3.9–12.7)

## 2022-11-29 PROCEDURE — 85025 COMPLETE CBC W/AUTO DIFF WBC: CPT | Performed by: OBSTETRICS & GYNECOLOGY

## 2022-11-29 PROCEDURE — 86850 RBC ANTIBODY SCREEN: CPT | Performed by: OBSTETRICS & GYNECOLOGY

## 2022-11-29 PROCEDURE — 36415 COLL VENOUS BLD VENIPUNCTURE: CPT | Performed by: OBSTETRICS & GYNECOLOGY

## 2022-11-29 NOTE — DISCHARGE INSTRUCTIONS
Before 7 AM, enter through the Emergency Entrance..   After 7 AM enter through the Main Entrance.      Your procedure  is scheduled for __12/2/22________.    Call [phone] between 2pm and 5pm on _12/1/22______to find out your arrival time for the day of surgery.    You may use the main entrance to the hospital on the [facility] side, or the entrance that is next to the [facility].    You may have one visitor.  No children allowed.     You will be going to the Same Day Surgery Unit on the 2nd floor of the hospital.    Important instructions:  Do not eat anything after midnight.  You may have plain water, non carbonated.  You may also have Gatorade or Powerade after midnight.    Stop all fluids 2 hours before your surgery.    It is okay to brush your teeth.  Do not have gum, candy or mints.    SEE MEDICATION SHEET.   TAKE MEDICATIONS AS DIRECTED WITH SIPS OF WATER.        STOP taking Aspirin, Ibuprofen,  Advil, Motrin, Mobic(meloxicam), Aleve (naproxen), Fish oil, and Vitamin E for at least 7 days before your surgery.     You may take Tylenol if needed which is not a blood thinner.    Please shower the night before and the morning of your surgery.      Follow any Prep Instructions given by your surgeon.    Use Chlorhexidine soap as instructed by your pre op nurse.   Please place clean linens on your bed the night before surgery. Please wear fresh clean clothing after each shower.    No shaving of procedural area at least 4-5 days before surgery due to increased risk of skin irritation and/or possible infection.    Female patients may be asked for a urine specimen on the morning of the surgery.  Please check with your nurse before using the restroom.    Contact lenses and removable denture work may not be worn during your procedure.    You may wear deodorant only. If you are having breast surgery, do not wear deodorant on the operative side.    Do not wear powder, body lotion, perfume/cologne or  make-up.    Do not wear any jewelry or have any metal on your body.    You will be asked to remove any dentures or partials for the procedure.    If you are going home on the same day of surgery, you must arrange for a family member or a friend to drive you home.  Public transportation is prohibited.  You will not be able to drive home if you were given anesthesia or sedation.    Patients who want to have their Post-op prescriptions filled from our in-house [facility], bring a Credit/Debit Card or cash with you. A co-pay may be required.  The pharmacy closes at 5:30 pm.    Wear loose fitting clothes allowing for bandages.    Please leave money and valuables home.      You may bring your cell phone.    Call the doctor if fever or illness should occur before your surgery.    Call [phone] to contact us here if needed.                            CLOTHES ON DAY OF SURGERY      ABDOMINAL surgery:  wear loose, comfortable clothing.  Nothing tight around the abdomen.  NO JEANS

## 2022-12-02 ENCOUNTER — ANESTHESIA (OUTPATIENT)
Dept: SURGERY | Facility: HOSPITAL | Age: 37
End: 2022-12-02
Payer: MEDICAID

## 2022-12-02 ENCOUNTER — HOSPITAL ENCOUNTER (OUTPATIENT)
Facility: HOSPITAL | Age: 37
Discharge: HOME | End: 2022-12-02
Attending: OBSTETRICS & GYNECOLOGY | Admitting: OBSTETRICS & GYNECOLOGY
Payer: MEDICAID

## 2022-12-02 VITALS
HEART RATE: 66 BPM | SYSTOLIC BLOOD PRESSURE: 107 MMHG | DIASTOLIC BLOOD PRESSURE: 68 MMHG | RESPIRATION RATE: 18 BRPM | WEIGHT: 226.81 LBS | TEMPERATURE: 98 F | OXYGEN SATURATION: 100 % | BODY MASS INDEX: 35.52 KG/M2

## 2022-12-02 DIAGNOSIS — Z98.51 S/P TUBAL LIGATION: Primary | ICD-10-CM

## 2022-12-02 DIAGNOSIS — Z01.818 PREOP TESTING: ICD-10-CM

## 2022-12-02 DIAGNOSIS — Z30.2 ENCOUNTER FOR FEMALE STERILIZATION PROCEDURE: ICD-10-CM

## 2022-12-02 LAB
B-HCG UR QL: NEGATIVE
POCT GLUCOSE: 91 MG/DL (ref 70–110)

## 2022-12-02 PROCEDURE — 25000003 PHARM REV CODE 250: Performed by: STUDENT IN AN ORGANIZED HEALTH CARE EDUCATION/TRAINING PROGRAM

## 2022-12-02 PROCEDURE — 37000008 HC ANESTHESIA 1ST 15 MINUTES: Performed by: OBSTETRICS & GYNECOLOGY

## 2022-12-02 PROCEDURE — 37000009 HC ANESTHESIA EA ADD 15 MINS: Performed by: OBSTETRICS & GYNECOLOGY

## 2022-12-02 PROCEDURE — 71000015 HC POSTOP RECOV 1ST HR: Performed by: OBSTETRICS & GYNECOLOGY

## 2022-12-02 PROCEDURE — 63600175 PHARM REV CODE 636 W HCPCS: Performed by: ANESTHESIOLOGY

## 2022-12-02 PROCEDURE — 25000003 PHARM REV CODE 250: Performed by: NURSE ANESTHETIST, CERTIFIED REGISTERED

## 2022-12-02 PROCEDURE — 00840 ANES IPER PX LOWER ABD NOS: CPT | Performed by: OBSTETRICS & GYNECOLOGY

## 2022-12-02 PROCEDURE — 36000708 HC OR TIME LEV III 1ST 15 MIN: Performed by: OBSTETRICS & GYNECOLOGY

## 2022-12-02 PROCEDURE — 58661 PR LAP,RMV  ADNEXAL STRUCTURE: ICD-10-PCS | Mod: 50,,, | Performed by: OBSTETRICS & GYNECOLOGY

## 2022-12-02 PROCEDURE — D9220A PRA ANESTHESIA: Mod: ANES,,, | Performed by: ANESTHESIOLOGY

## 2022-12-02 PROCEDURE — 63600175 PHARM REV CODE 636 W HCPCS: Performed by: STUDENT IN AN ORGANIZED HEALTH CARE EDUCATION/TRAINING PROGRAM

## 2022-12-02 PROCEDURE — 25000003 PHARM REV CODE 250: Performed by: OBSTETRICS & GYNECOLOGY

## 2022-12-02 PROCEDURE — D9220A PRA ANESTHESIA: Mod: CRNA,,, | Performed by: STUDENT IN AN ORGANIZED HEALTH CARE EDUCATION/TRAINING PROGRAM

## 2022-12-02 PROCEDURE — 71000016 HC POSTOP RECOV ADDL HR: Performed by: OBSTETRICS & GYNECOLOGY

## 2022-12-02 PROCEDURE — 25000003 PHARM REV CODE 250: Performed by: ANESTHESIOLOGY

## 2022-12-02 PROCEDURE — 63600175 PHARM REV CODE 636 W HCPCS: Performed by: NURSE ANESTHETIST, CERTIFIED REGISTERED

## 2022-12-02 PROCEDURE — 71000033 HC RECOVERY, INTIAL HOUR: Performed by: OBSTETRICS & GYNECOLOGY

## 2022-12-02 PROCEDURE — 82962 GLUCOSE BLOOD TEST: CPT | Performed by: OBSTETRICS & GYNECOLOGY

## 2022-12-02 PROCEDURE — 81025 URINE PREGNANCY TEST: CPT | Performed by: OBSTETRICS & GYNECOLOGY

## 2022-12-02 PROCEDURE — 63600175 PHARM REV CODE 636 W HCPCS: Performed by: OBSTETRICS & GYNECOLOGY

## 2022-12-02 PROCEDURE — 27201423 OPTIME MED/SURG SUP & DEVICES STERILE SUPPLY: Performed by: OBSTETRICS & GYNECOLOGY

## 2022-12-02 PROCEDURE — 71000039 HC RECOVERY, EACH ADD'L HOUR: Performed by: OBSTETRICS & GYNECOLOGY

## 2022-12-02 PROCEDURE — 36000709 HC OR TIME LEV III EA ADD 15 MIN: Performed by: OBSTETRICS & GYNECOLOGY

## 2022-12-02 PROCEDURE — 58661 LAPAROSCOPY REMOVE ADNEXA: CPT | Mod: 50,,, | Performed by: OBSTETRICS & GYNECOLOGY

## 2022-12-02 PROCEDURE — D9220A PRA ANESTHESIA: ICD-10-PCS | Mod: ANES,,, | Performed by: ANESTHESIOLOGY

## 2022-12-02 PROCEDURE — D9220A PRA ANESTHESIA: ICD-10-PCS | Mod: CRNA,,, | Performed by: STUDENT IN AN ORGANIZED HEALTH CARE EDUCATION/TRAINING PROGRAM

## 2022-12-02 RX ORDER — IBUPROFEN 600 MG/1
600 TABLET ORAL EVERY 6 HOURS PRN
Status: CANCELLED | OUTPATIENT
Start: 2022-12-02

## 2022-12-02 RX ORDER — DIPHENHYDRAMINE HYDROCHLORIDE 50 MG/ML
25 INJECTION INTRAMUSCULAR; INTRAVENOUS EVERY 4 HOURS PRN
Status: CANCELLED | OUTPATIENT
Start: 2022-12-02

## 2022-12-02 RX ORDER — ACETAMINOPHEN 500 MG
1000 TABLET ORAL
Status: COMPLETED | OUTPATIENT
Start: 2022-12-02 | End: 2022-12-02

## 2022-12-02 RX ORDER — LIDOCAINE HYDROCHLORIDE 20 MG/ML
INJECTION INTRAVENOUS
Status: DISCONTINUED | OUTPATIENT
Start: 2022-12-02 | End: 2022-12-02

## 2022-12-02 RX ORDER — ROCURONIUM BROMIDE 10 MG/ML
INJECTION, SOLUTION INTRAVENOUS
Status: DISCONTINUED | OUTPATIENT
Start: 2022-12-02 | End: 2022-12-02

## 2022-12-02 RX ORDER — ONDANSETRON 2 MG/ML
4 INJECTION INTRAMUSCULAR; INTRAVENOUS DAILY PRN
Status: DISCONTINUED | OUTPATIENT
Start: 2022-12-02 | End: 2022-12-02 | Stop reason: HOSPADM

## 2022-12-02 RX ORDER — OXYCODONE AND ACETAMINOPHEN 5; 325 MG/1; MG/1
1 TABLET ORAL EVERY 6 HOURS PRN
Qty: 15 TABLET | Refills: 0 | Status: SHIPPED | OUTPATIENT
Start: 2022-12-02 | End: 2023-01-09

## 2022-12-02 RX ORDER — PROPOFOL 10 MG/ML
VIAL (ML) INTRAVENOUS
Status: DISCONTINUED | OUTPATIENT
Start: 2022-12-02 | End: 2022-12-02

## 2022-12-02 RX ORDER — IBUPROFEN 600 MG/1
600 TABLET ORAL EVERY 6 HOURS PRN
Qty: 40 TABLET | Refills: 0 | Status: SHIPPED | OUTPATIENT
Start: 2022-12-02 | End: 2023-01-09

## 2022-12-02 RX ORDER — FENTANYL CITRATE 50 UG/ML
INJECTION, SOLUTION INTRAMUSCULAR; INTRAVENOUS
Status: DISCONTINUED | OUTPATIENT
Start: 2022-12-02 | End: 2022-12-02

## 2022-12-02 RX ORDER — PROCHLORPERAZINE EDISYLATE 5 MG/ML
5 INJECTION INTRAMUSCULAR; INTRAVENOUS EVERY 6 HOURS PRN
Status: DISCONTINUED | OUTPATIENT
Start: 2022-12-02 | End: 2022-12-02 | Stop reason: HOSPADM

## 2022-12-02 RX ORDER — HYDROMORPHONE HYDROCHLORIDE 2 MG/ML
0.2 INJECTION, SOLUTION INTRAMUSCULAR; INTRAVENOUS; SUBCUTANEOUS EVERY 5 MIN PRN
Status: DISCONTINUED | OUTPATIENT
Start: 2022-12-02 | End: 2022-12-02 | Stop reason: HOSPADM

## 2022-12-02 RX ORDER — LIDOCAINE HYDROCHLORIDE 10 MG/ML
1 INJECTION, SOLUTION EPIDURAL; INFILTRATION; INTRACAUDAL; PERINEURAL ONCE
Status: DISCONTINUED | OUTPATIENT
Start: 2022-12-02 | End: 2022-12-02 | Stop reason: HOSPADM

## 2022-12-02 RX ORDER — CEFAZOLIN SODIUM 2 G/50ML
2 SOLUTION INTRAVENOUS
Status: COMPLETED | OUTPATIENT
Start: 2022-12-02 | End: 2022-12-02

## 2022-12-02 RX ORDER — SCOLOPAMINE TRANSDERMAL SYSTEM 1 MG/1
PATCH, EXTENDED RELEASE TRANSDERMAL
Status: DISCONTINUED | OUTPATIENT
Start: 2022-12-02 | End: 2022-12-02

## 2022-12-02 RX ORDER — MUPIROCIN 20 MG/G
OINTMENT TOPICAL
Status: DISCONTINUED | OUTPATIENT
Start: 2022-12-02 | End: 2022-12-02 | Stop reason: HOSPADM

## 2022-12-02 RX ORDER — ONDANSETRON 2 MG/ML
INJECTION INTRAMUSCULAR; INTRAVENOUS
Status: DISCONTINUED | OUTPATIENT
Start: 2022-12-02 | End: 2022-12-02

## 2022-12-02 RX ORDER — HALOPERIDOL 5 MG/ML
0.5 INJECTION INTRAMUSCULAR EVERY 10 MIN PRN
Status: DISCONTINUED | OUTPATIENT
Start: 2022-12-02 | End: 2022-12-02 | Stop reason: HOSPADM

## 2022-12-02 RX ORDER — SODIUM CHLORIDE 0.9 % (FLUSH) 0.9 %
10 SYRINGE (ML) INJECTION
Status: DISCONTINUED | OUTPATIENT
Start: 2022-12-02 | End: 2022-12-02 | Stop reason: HOSPADM

## 2022-12-02 RX ORDER — KETOROLAC TROMETHAMINE 30 MG/ML
INJECTION, SOLUTION INTRAMUSCULAR; INTRAVENOUS
Status: DISCONTINUED | OUTPATIENT
Start: 2022-12-02 | End: 2022-12-02

## 2022-12-02 RX ORDER — ONDANSETRON 4 MG/1
8 TABLET, ORALLY DISINTEGRATING ORAL EVERY 8 HOURS PRN
Status: DISCONTINUED | OUTPATIENT
Start: 2022-12-02 | End: 2022-12-02 | Stop reason: HOSPADM

## 2022-12-02 RX ORDER — DEXAMETHASONE SODIUM PHOSPHATE 4 MG/ML
INJECTION, SOLUTION INTRA-ARTICULAR; INTRALESIONAL; INTRAMUSCULAR; INTRAVENOUS; SOFT TISSUE
Status: DISCONTINUED | OUTPATIENT
Start: 2022-12-02 | End: 2022-12-02

## 2022-12-02 RX ORDER — SODIUM CHLORIDE 9 MG/ML
INJECTION, SOLUTION INTRAVENOUS CONTINUOUS
Status: DISCONTINUED | OUTPATIENT
Start: 2022-12-02 | End: 2022-12-02 | Stop reason: HOSPADM

## 2022-12-02 RX ORDER — DIPHENHYDRAMINE HCL 25 MG
25 CAPSULE ORAL EVERY 4 HOURS PRN
Status: CANCELLED | OUTPATIENT
Start: 2022-12-02

## 2022-12-02 RX ORDER — SODIUM CHLORIDE, SODIUM LACTATE, POTASSIUM CHLORIDE, CALCIUM CHLORIDE 600; 310; 30; 20 MG/100ML; MG/100ML; MG/100ML; MG/100ML
INJECTION, SOLUTION INTRAVENOUS CONTINUOUS
Status: DISCONTINUED | OUTPATIENT
Start: 2022-12-02 | End: 2022-12-02 | Stop reason: HOSPADM

## 2022-12-02 RX ORDER — MIDAZOLAM HYDROCHLORIDE 1 MG/ML
INJECTION, SOLUTION INTRAMUSCULAR; INTRAVENOUS
Status: DISCONTINUED | OUTPATIENT
Start: 2022-12-02 | End: 2022-12-02

## 2022-12-02 RX ORDER — HYDROCODONE BITARTRATE AND ACETAMINOPHEN 5; 325 MG/1; MG/1
1 TABLET ORAL EVERY 4 HOURS PRN
Status: CANCELLED | OUTPATIENT
Start: 2022-12-02

## 2022-12-02 RX ADMIN — GLYCOPYRROLATE 0.1 MG: 0.2 INJECTION, SOLUTION INTRAMUSCULAR; INTRAVITREAL at 11:12

## 2022-12-02 RX ADMIN — HYDROMORPHONE HYDROCHLORIDE 0.2 MG: 2 INJECTION INTRAMUSCULAR; INTRAVENOUS; SUBCUTANEOUS at 01:12

## 2022-12-02 RX ADMIN — PROPOFOL 20 MG: 10 INJECTION, EMULSION INTRAVENOUS at 12:12

## 2022-12-02 RX ADMIN — SODIUM CHLORIDE, SODIUM LACTATE, POTASSIUM CHLORIDE, AND CALCIUM CHLORIDE: .6; .31; .03; .02 INJECTION, SOLUTION INTRAVENOUS at 10:12

## 2022-12-02 RX ADMIN — MUPIROCIN: 20 OINTMENT TOPICAL at 10:12

## 2022-12-02 RX ADMIN — SODIUM CHLORIDE, SODIUM LACTATE, POTASSIUM CHLORIDE, AND CALCIUM CHLORIDE: .6; .31; .03; .02 INJECTION, SOLUTION INTRAVENOUS at 12:12

## 2022-12-02 RX ADMIN — SUGAMMADEX 200 MG: 100 INJECTION, SOLUTION INTRAVENOUS at 01:12

## 2022-12-02 RX ADMIN — ROCURONIUM BROMIDE 40 MG: 10 INJECTION, SOLUTION INTRAVENOUS at 12:12

## 2022-12-02 RX ADMIN — MIDAZOLAM HYDROCHLORIDE 2 MG: 1 INJECTION, SOLUTION INTRAMUSCULAR; INTRAVENOUS at 11:12

## 2022-12-02 RX ADMIN — LIDOCAINE HYDROCHLORIDE 100 MG: 20 INJECTION, SOLUTION INTRAVENOUS at 12:12

## 2022-12-02 RX ADMIN — ONDANSETRON 4 MG: 2 INJECTION, SOLUTION INTRAMUSCULAR; INTRAVENOUS at 12:12

## 2022-12-02 RX ADMIN — KETOROLAC TROMETHAMINE 30 MG: 30 INJECTION, SOLUTION INTRAMUSCULAR; INTRAVENOUS at 01:12

## 2022-12-02 RX ADMIN — FENTANYL CITRATE 100 MCG: 0.05 INJECTION, SOLUTION INTRAMUSCULAR; INTRAVENOUS at 12:12

## 2022-12-02 RX ADMIN — DEXAMETHASONE SODIUM PHOSPHATE 4 MG: 4 INJECTION, SOLUTION INTRAMUSCULAR; INTRAVENOUS at 12:12

## 2022-12-02 RX ADMIN — SCOPOLAMINE 1 PATCH: 1 PATCH, EXTENDED RELEASE TRANSDERMAL at 11:12

## 2022-12-02 RX ADMIN — CEFAZOLIN SODIUM 2 G: 2 SOLUTION INTRAVENOUS at 12:12

## 2022-12-02 RX ADMIN — HYDROMORPHONE HYDROCHLORIDE 0.2 MG: 2 INJECTION INTRAMUSCULAR; INTRAVENOUS; SUBCUTANEOUS at 02:12

## 2022-12-02 RX ADMIN — PROPOFOL 180 MG: 10 INJECTION, EMULSION INTRAVENOUS at 12:12

## 2022-12-02 RX ADMIN — FENTANYL CITRATE 50 MCG: 0.05 INJECTION, SOLUTION INTRAMUSCULAR; INTRAVENOUS at 12:12

## 2022-12-02 RX ADMIN — ACETAMINOPHEN 1000 MG: 500 TABLET ORAL at 10:12

## 2022-12-02 NOTE — ANESTHESIA PROCEDURE NOTES
Intubation    Date/Time: 12/2/2022 12:07 PM  Performed by: Evan Caballero CRNA  Authorized by: Carlos Bahena MD     Intubation:     Induction:  Intravenous    Intubated:  Postinduction    Mask Ventilation:  Easy with oral airway    Attempts:  1    Attempted By:  CRNA    Method of Intubation:  Video laryngoscopy    Blade:  Bermudez 3    Laryngeal View Grade: Grade I - full view of cords      Difficult Airway Encountered?: No      Complications:  None    Airway Device:  Oral endotracheal tube    Airway Device Size:  7.0    Style/Cuff Inflation:  Cuffed (inflated to minimal occlusive pressure)    Inflation Amount (mL):  5    Tube secured:  21    Secured at:  The lips    Placement Verified By:  Capnometry    Complicating Factors:  None    Findings Post-Intubation:  BS equal bilateral and atraumatic/condition of teeth unchanged

## 2022-12-02 NOTE — DISCHARGE SUMMARY
[facility]    Obstetrics & Gynecology  Discharge Summary      Patient Name:  Pilar Hernandez  MRN:  [id number]  Admission Date:  2022  Discharge Date:  2022  Hospital Length of Stay:  0  Attending Physician:  Julio Cesar Elias MD  Discharging Physician:  Julio Cesar Elias MD  Primary Care Provider:  Dana Germain MD  Date of Surgery:  2022    Admitting Diagnosis:       Multiparity  Complete family planning  Desire permanent sterilization     Discharge Diagnosis:    Multiparity  Complete family planning  Desire permanent sterilization  S/p laparoscopic bilateral salpingectomy     Procedure performed:      Laparoscopic bilateral salpingectomy    Brief Hospital Course:      See H&P for complete details.  In brief, Pilar Hernandez is a 37 y.o.  multiparous who has completed her family planning and is requesting permanent sterilization with laparoscopic bilateral tubal ligation.  Pt has completed her family planning and is resolute in her decision to proceed with surgical permanent sterilization, and has declined medical management and other conservative therapies.  Pt tolerated the surgery well without complication.  Please see operative report for further details.  Following the surgery, pt was transferred to the PACU in stable condition.  Pt had an uncomplicated post-operative course.  Prior to discharge, pt was without major complaints.  Pain was well-controlled.  Vital signs where physiologic and stable.  Physical exam showed no acute findings.  Pt had no active vaginal bleeding.  Surgical incisions was hemostatic.  Pt was ambulating, tolerating oral intake, and voiding without difficulty.  Pt had satisfied routine post-op discharge criteria and expressed the desire to go home.        Pertinent Labs/Studies:      Recent Results (from the past 336 hour(s))   CBC auto differential    Collection Time: 22  9:30 AM   Result Value Ref Range    WBC 6.14 3.90  - 12.70 K/uL    Hemoglobin 11.5 (L) 12.0 - 16.0 g/dL    Hematocrit 36.2 (L) 37.0 - 48.5 %    Platelets 254 150 - 450 K/uL     Discharge Exam:      Temp:  [97.6 °F (36.4 °C)-98.2 °F (36.8 °C)] 97.6 °F (36.4 °C)  Pulse:  [60-88] 66  Resp:  [15-18] 18  SpO2:  [99 %-100 %] 100 %  BP: (107-134)/(68-84) 107/68    /68 (BP Location: Left arm, Patient Position: Lying)   Pulse 66   Temp 97.6 °F (36.4 °C) (Oral)   Resp 18   Wt 102.9 kg (226 lb 13 oz)   LMP 11/10/2022 (Exact Date)   SpO2 100%   Breastfeeding No   BMI 35.52 kg/m²     GENERAL:  No acute distress. Alert and oriented x 3.   NECK:  Supple, FROM.  LUNGS:  Clear to auscultation bilaterally.   HEART:  Regular rate and rhythm with physiologic heart sounds.   ABDOMEN:  Soft, non-tender, no guarding, rigidity, or rebound.   INCISION:  Clean, dry, & intact.   EXT:  No cramping, claudication or edema.  Good skin turgor.  +2 distal pulses, symmetric.  Full range of motion.   NEURO:  Grossly intact bilaterally.   PSYCH:  Mood and affect appropriate.   PERINEUM:  Intact with no active bleeding.    Discharge Condition:  Stable      Discharge Disposition:  Home       Discharge Medications:     Motrin  Percocet    Discharge Activites:      Activities as tolerated with adequate rest  Pelvic rest for 6 weeks   No heavy lifting greater 10 lbs or vigorous activities   Showers only  Wound care instructions and precautions reviewed  Avoid driving and operating machinery while taking narcotics or other sedative medications.  Patient voiced understanding.    Discharge Diet:  Regular diet    Discharge Instructions:      Post-op care instructions and precautions, clinical/sugical findings, and hospital course reviewed with patient prior to discharge.  Pt was instructed to contact the clinic or emergency department for any concerns including but not limited to an increase in her vaginal bleeding, abdominal pain, foul vaginal discharge, weakness, decrease activity level,  decrease appetite, difficulty with voiding or having bowel movements, wound breakdown, perineal pain or breakdown, fever >100.4, shortness of breath, chest pain, dizziness or feeling faint, pain or swelling in her extremities, headaches not relieved with rest and Tylenol, abnormal bleeding/bruising, or any other health concerns.  All of pt questions were answered to her satisfaction.  Pt voiced understanding.     Follow-up Visit:  4 weeks for post-op visit, or sooner if any problems.       Julio Cesar Elias MD

## 2022-12-02 NOTE — ANESTHESIA PREPROCEDURE EVALUATION
12/02/2022  Pilar Hernandez is a 37 y.o., female.  To undergo Procedure(s) (LRB):  LIGATION, FALLOPIAN TUBE, LAPAROSCOPIC (Bilateral)     Denies CP/SOB/GERD/MI/CVA/URI symptoms.  METS > 4  NPO > 8    Past Medical History:  Past Medical History:   Diagnosis Date    Anemia     Hypertension     Infertility        Past Surgical History:  Past Surgical History:   Procedure Laterality Date    verticle  sleeve  2013    patient lost 109# Dr. Carlos Bowles        Social History:  Social History     Socioeconomic History    Marital status:    Tobacco Use    Smoking status: Never    Smokeless tobacco: Never   Substance and Sexual Activity    Alcohol use: Yes     Comment: prior to pregnancy    Drug use: No    Sexual activity: Yes     Partners: Male     Birth control/protection: None     Comment: LMP 3/13/2020 pregnant     Social Determinants of Health     Financial Resource Strain: Low Risk     Difficulty of Paying Living Expenses: Not very hard   Food Insecurity: No Food Insecurity    Worried About Running Out of Food in the Last Year: Never true    Ran Out of Food in the Last Year: Never true   Transportation Needs: No Transportation Needs    Lack of Transportation (Medical): No    Lack of Transportation (Non-Medical): No   Physical Activity: Inactive    Days of Exercise per Week: 0 days    Minutes of Exercise per Session: 0 min   Stress: No Stress Concern Present    Feeling of Stress : Only a little   Social Connections: Unknown    Frequency of Communication with Friends and Family: More than three times a week    Frequency of Social Gatherings with Friends and Family: More than three times a week    Active Member of Clubs or Organizations: No    Attends Club or Organization Meetings: Never    Marital Status: Patient refused   Housing Stability: Unknown    Unable to Pay for Housing in the Last Year: No    Unstable Housing in the Last Year: No       Medications:  No current  facility-administered medications on file prior to encounter.     Current Outpatient Medications on File Prior to Encounter   Medication Sig Dispense Refill    valsartan-hydrochlorothiazide (DIOVAN-HCT) 160-12.5 mg per tablet Take 1 tablet by mouth once daily. 30 tablet 0    ZAFEMY 150-35 mcg/24 hr Place 1 patch onto the skin once a week for 3 weeks each month and off one week. 9 patch 3    ibuprofen (ADVIL,MOTRIN) 800 MG tablet take 1 TABLET(S) BY MOUTH EVERY 6 HOURS AS NEEDED for pain WITH FOOD      neomycin-polymyxin-hydrocortisone (CORTISPORIN) otic solution instill 3 drops in left ear FOUR TIMES DAILY for 7 days      nystatin-triamcinolone (MYCOLOG II) cream Apply topically 2 (two) times daily. 60 g 2       Allergies:  Review of patient's allergies indicates:  No Known Allergies    Active Problems:  Patient Active Problem List   Diagnosis    RGACE (iron deficiency anemia)    Hypertension, essential, benign    Candidal intertrigo    Chronic hypertension during pregnancy, antepartum       Diagnostic Studies:   Latest Reference Range & Units 11/29/22 09:30   WBC 3.90 - 12.70 K/uL 6.14   RBC 4.00 - 5.40 M/uL 4.47   Hemoglobin 12.0 - 16.0 g/dL 11.5 (L)   Hematocrit 37.0 - 48.5 % 36.2 (L)   MCV 82 - 98 fL 81 (L)   MCH 27.0 - 31.0 pg 25.7 (L)   MCHC 32.0 - 36.0 g/dL 31.8 (L)   RDW 11.5 - 14.5 % 13.6   Platelets 150 - 450 K/uL 254      Latest Reference Range & Units 06/29/22 09:15   Sodium 136 - 145 mmol/L 142   Potassium 3.5 - 5.1 mmol/L 3.5   Chloride 95 - 110 mmol/L 106   CO2 23 - 29 mmol/L 30 (H)   Anion Gap 8 - 16 mmol/L 6 (L)   BUN 6 - 20 mg/dL 13   Creatinine 0.5 - 1.4 mg/dL 0.8   eGFR if non African American >60 mL/min/1.73 m^2 >60   eGFR if African American >60 mL/min/1.73 m^2 >60   Glucose 70 - 110 mg/dL 87   Calcium 8.7 - 10.5 mg/dL 8.9   Alkaline Phosphatase 55 - 135 U/L 50 (L)   PROTEIN TOTAL 6.0 - 8.4 g/dL 7.2   Albumin 3.5 - 5.2 g/dL 3.7     EKG (6/29/22):  Sinus bradycardia     24 Hour  Vitals:  Temp:  [36.8 °C (98.2 °F)] 36.8 °C (98.2 °F)  Pulse:  [79] 79  Resp:  [18] 18  SpO2:  [100 %] 100 %  BP: (134)/(84) 134/84   See Nursing Charting For Additional Vitals        Pre-op Assessment    I have reviewed the Patient Summary Reports.     I have reviewed the Nursing Notes.       Review of Systems  Anesthesia Hx:  No problems with previous Anesthesia   Denies Personal Hx of Anesthesia complications.   Social:  Non-Smoker, No Alcohol Use    Hematology/Oncology:         -- Anemia:   Cardiovascular:   Exercise tolerance: good Hypertension ECG has been reviewed.    Pulmonary:  Pulmonary Normal    Hepatic/GI:  Hepatic/GI Normal    Neurological:  Neurology Normal    Endocrine:  Obesity / BMI > 30      Physical Exam  General: Well nourished    Airway:  Mallampati: III   Mouth Opening: Normal  TM Distance: Normal      Dental:  Intact    Chest/Lungs:  Clear to auscultation, Normal Respiratory Rate    Heart:  Rate: Normal  Rhythm: Regular Rhythm        Anesthesia Plan  Type of Anesthesia, risks & benefits discussed:    Anesthesia Type: Gen ETT  Intra-op Monitoring Plan: Standard ASA Monitors  Post Op Pain Control Plan: multimodal analgesia and IV/PO Opioids PRN  Induction:  IV  Airway Plan: Direct and Video, Post-Induction  Informed Consent: Informed consent signed with the Patient and all parties understand the risks and agree with anesthesia plan.  All questions answered.   ASA Score: 2    Ready For Surgery From Anesthesia Perspective.     .

## 2022-12-02 NOTE — DISCHARGE INSTRUCTIONS
BATHING:                   You may shower after your dressing is removed, but no tub baths, hot tubs, saunas or swimming until you see the doctor.    DRESSING:  Remove your bandage in 24 hours. If there are skin tapes over the incision, leave them in place. They will start to come off in 5-7 days.  ACTIVITY LEVEL: If you have received sedation or an anesthetic, you may feel sleepy for   several hours. Rest until you are more awake. Gradually resume your normal activities  No heavy lifting or straining, nothing over 10 lbs., like a gallon of milk.  Pelvic rest- no sex, tampons or douching until follow up or instructed by doctor.  DIET:  You may resume your home diet. If nausea is present, increase your diet gradually with fluids and bland foods.    Medications:  Pain medication should be taken only if needed and as directed. If antibiotics are prescribed, the medication should be taken until completed. You will be given an updated list of you medications.  No driving, alcoholic beverages or signing legal documents for next 24 hours or while taking pain medication    CALL THE DOCTOR:   For any obvious bleeding (some dried blood over the incision is normal).     Redness, swelling, foul smell around incision or fever over 101.  Shortness of breath, Coughing up Bloody Sputum or Pains or Swelling in your calves.  Persistent pain or nausea not relieved by medication.  If vaginal bleeding is in excess of a normal period.  Problems urinating    If any unusual problems or difficulties occur contact your doctor. If you cannot contact your doctor but feel your signs and symptoms warrant a physicians attention return to the emergency room.   Scopolamine (rich boss) Skin Patch Discharge Instructions    What is this drug used for?   It is used to help motion sickness.  It is used to treat GI (gastrointestinal) spasms.  It is used to prevent upset stomach and throwing up from surgery.  It is used during surgery.    What  are some side effects of this drug?*  All drugs may cause side effects. However, many people have no side effects or only have minor side effects. Call your doctor or get medical help if any of these side effects or any other side effects bother you or do not go away:    Dry mouth.  Feeling dizzy or sleepy.  Diarrhea.  Upset stomach.  Sore throat.  Restlessness.  Irritation where this drug is used(SKIN PATCH).    Skin patch:     Do not take this drug by mouth. Use on your skin only. Keep out of your mouth and eyes (may burn).  *The patch may have metal. Take off the patch before an MRI.  Wash your hands before and after use.  Wear only one patch at a time.  Be careful to not knock loose the patch while bathing/showering.  When patch is taken off, wash site with soap and water.  After you take off a skin patch, be sure to fold the sticky sides of the patch to each other. Throw away used patches where children and pets cannot get to them.    Removing Skin Patch:    Remove the disc after 3 days, or sooner if you no longer need it or are experiencing side effects. Remove patch with gloves or tissue paper to avoid direct contact with hands. After removal wash area behind ear with soap and water. Fold disc in half and throw in trash that is away from children and pets to avoid accidental contact or ingestion.     *These are not all of the side effects that may occur. If you have questions about side effects, call your doctor. Call your doctor for medical advice about side effects.  You may report side effects to your national health agency.  You may report side effects to the FDA at 1-121.103.6452. You may also report side effects at https://www.fda.gov/medwatch. DIET: You may resume your home diet. If nausea is present, increase your diet gradually with fluids and bland foods    ACTIVITY LEVEL: You have received sedation or an anesthetic, you may feel sleepy for several hours. Rest until you are more awake. Gradually  resume your normal activities    Medications: Pain medication should be taken only if needed and as directed. If antibiotics are prescribed, the medication should be taken until completed.     No driving, alcoholic beverages or signing legal documents for next 24 hours or while taking pain medication.       CALL THE DOCTOR:   For any obvious bleeding (some dried blood over the incision is normal).     Redness, swelling, foul smell around incision or fever over 101.  Shortness of breath, Coughing up Bloody sputum, Pains or Swelling in your Calves .  Persistent pain or nausea not relieved by medication.    If any unusual problems or difficulties occur contact your doctor. If you cannot contact your doctor but feel your signs and symptoms warrant a physicians attention return to the emergency room.

## 2022-12-02 NOTE — TRANSFER OF CARE
Anesthesia Transfer of Care Note    Patient: Pilar Hernandez    Procedure(s) Performed: Procedure(s) (LRB):  LIGATION, FALLOPIAN TUBE, LAPAROSCOPIC (Bilateral)    Patient location: PACU    Anesthesia Type: general    Transport from OR: Transported from OR on room air with adequate spontaneous ventilation    Post pain: adequate analgesia    Post assessment: no apparent anesthetic complications and tolerated procedure well    Post vital signs: stable    Level of consciousness: alert and responds to stimulation    Nausea/Vomiting: no nausea/vomiting    Complications: none    Transfer of care protocol was followed      Last vitals:   Visit Vitals  /78 (BP Location: Left arm, Patient Position: Lying)   Pulse 85   Temp 36.7 °C (98 °F) (Temporal)   Resp 16   Wt 102.9 kg (226 lb 13 oz)   LMP 11/10/2022 (Exact Date)   SpO2 100%   Breastfeeding No   BMI 35.52 kg/m²

## 2022-12-02 NOTE — OP NOTE
[facility]   Operative Report   Obstetrics & Gynecology     Date of Surgery:  2022     Surgeon:  Julio Cesar Elias MD     Preoperative diagnosis:     Multiparity  Complete family planning  Desire permanent sterilization     Postoperative diagnosis:     Multiparity  Complete family planning  Desire permanent sterilization  S/p laparoscopic bilateral salpingectomy     Procedure performed:  Laparoscopic bilateral salpingectomy  (CPT 66834)    Anesthesia:  General      IV Fluids:  1000 mL of LR     Urine Output:  100 mL, clear at end of procedure       Estimated Blood Loss:  <5 mL with no replacements     Specimens:  Bilateral fallopian tubes    Findings:       Normal appearing uterus  Right and left fallopian tubes and ovaries normal appearing  Anterior and posterior cul-de-sac clear  Pelvic side walls clear without gross lymphadenopathy  Omentum, small and large bowel in operative field normal appearing  Liver edge with normal appearence    Complications:  None    Indications for the Procedure:      See H&P for complete details.  In brief, Pilar Hernandez is a 37 y.o.  multiparous who has completed her family planning and is requesting permanent sterilization with laparoscopic bilateral tubal ligation.  Pt has completed her family planning and is resolute in her decision to proceed with surgical permanent sterilization, and has declined medical management and other conservative therapies.    Pt was informed of the risks, benefits, and alternatives of a laparoscopic tubal ligation including failure rates (~0.25 - 1000 failure rate depending on final technique and timing of procedure). Risks included but were not limited to future infertility/sterilization, bleeding, infection, injury to surrounding organs or tissues, injury to bladder and/or urinary tract, injury to bowel and/or intestinal obstruction, damage to major blood vessels, hemorrhage requiring transfusion of blood  products, damage to ovaries requiring hormone administration, pulmonary embolism, fistula formation, urinary and/or fecal incontinence, sexual dysfunction/pain, chronic pain, hernia, failure of wound to heal, permanent/disfiguring scarring, and even death. In the event of a hysterectomy due to surgical complications from the tubal ligation, pt was counseled extensively on the inability to become pregnant and expressed an understanding of this. She was also counseled on the need for long term follow-up with pap smears and the possibility of future trachelectomy in the event of a supracervical hysterectomy. In the event of a oophorectomy due to ovarian injury, pt was also counseled that a bilateral oophorectomy will result in surgical menopause for pre/perimenopausal women and possible risks/benefits associated with hormone replacement therapy. Pt was counseled on the possibility of laparotomy if extensive adhesions or bleeding were encountered for the completion of the tubal ligation. The risks, benefits, and alternatives to blood transfusion was also discussed. Pt expressed an understanding of risks involved, all questions answered, and informed consent was obtained for surgery and blood transfusion.    Description of the Procedure:      Pt was taken to the operating room where a time out was performed to confirm the correct patient and correct procedure.  Preoperative prophylactic IV antibiotics was administered prior to the procedure.  Pt was then placed in the dorsal lithotomy position with legs supported in Nicolás stirrups and care was taken to pad all pressure points.  General anesthesia was obtained without difficulty.  A Dolly hugger was placed to maintain control of core body temperature.  Pt was then prepped and draped in a normal sterile fashion.  A garg catheter was inserted.  A sponge stick was used for uterine manipulator. Hemostasis was noted.  Attention was turned to the abdomen for laparoscopy.  A  small vertical incision was made infraumbilical.  The Veress needle was introduced into the peritoneum.  Placement of the needle was confirmed with normal saline drop test.  Pneumoperitoneum was then established with ~3 liters of carbon dioxide and the Veress needle removed.  A 11 mm trocar was inserted through the paraumbilical incision into the peritoneum under direct visualization with the laparoscope and the pneumoperitoneum was maintained using carbon dioxide.  An operative scope was introduced into the abdomen and survey of the pelvis revealed the above findings.  Next two 5 mm trocars were placed in bilateral lower quadrants under direct visualization with the laparoscope.  The fallopian tubes were then inspected bilaterally and followed to their fimbriated ends.  The right and left fallopian tubes where then grasped with alligator forceps and the mesosalpinx were coagulate/cut using the Coolseal. Good hemostasis was confirmed.  The pneumoperitoneum was evacuated.  All instruments and ports where removed from abdomen under direct visualization.  Excellent hemostasis was noted.  The fascia in the 11 mm port was reapproximated with 0-Vicryl.  All skin incisions were closed using 4-0 vicryl.  The uterine manipulator was removed from the cervix.  Good hemostasis was confirmed.  Pt tolerated the procedure well.  Sponge, lap and needle counts were correct time two.  Pt was transferred to the PACU in stable condition. Findings and expectations were discussed with pt.  All of her questions were answered to her satisfaction and pt voiced understanding.       Julio Cesar Elias MD

## 2022-12-03 NOTE — ANESTHESIA POSTPROCEDURE EVALUATION
Anesthesia Post Evaluation    Patient: Pilar Hernandez    Procedure(s) Performed: Procedure(s) (LRB):  LIGATION, FALLOPIAN TUBE, LAPAROSCOPIC (Bilateral)    Final Anesthesia Type: general      Patient location during evaluation: PACU  Patient participation: Yes- Able to Participate  Level of consciousness: awake and alert and oriented  Post-procedure vital signs: reviewed and stable  Pain management: adequate  Airway patency: patent    PONV status at discharge: No PONV  Anesthetic complications: no      Cardiovascular status: hemodynamically stable and blood pressure returned to baseline  Respiratory status: spontaneous ventilation, room air and unassisted  Hydration status: euvolemic  Follow-up not needed.          Vitals Value Taken Time   /68 12/02/22 1500   Temp 36.4 °C (97.6 °F) 12/02/22 1500   Pulse 66 12/02/22 1500   Resp 18 12/02/22 1500   SpO2 100 % 12/02/22 1500         Event Time   Out of Recovery 14:20:59         Pain/Tash Score: Pain Rating Prior to Med Admin: 4 (12/2/2022  3:00 PM)  Pain Rating Post Med Admin: 4 (12/2/2022  2:17 PM)  Tash Score: 9 (12/2/2022  3:00 PM)

## 2022-12-08 LAB
FINAL PATHOLOGIC DIAGNOSIS: NORMAL
Lab: NORMAL

## 2023-01-09 ENCOUNTER — OFFICE VISIT (OUTPATIENT)
Dept: OBSTETRICS AND GYNECOLOGY | Facility: CLINIC | Age: 38
End: 2023-01-09
Payer: MEDICAID

## 2023-01-09 VITALS
WEIGHT: 234.44 LBS | DIASTOLIC BLOOD PRESSURE: 78 MMHG | BODY MASS INDEX: 36.8 KG/M2 | SYSTOLIC BLOOD PRESSURE: 114 MMHG | HEIGHT: 67 IN

## 2023-01-09 DIAGNOSIS — Z98.51 S/P TUBAL LIGATION: ICD-10-CM

## 2023-01-09 DIAGNOSIS — B37.2 CANDIDAL INTERTRIGO: ICD-10-CM

## 2023-01-09 DIAGNOSIS — Z09 POSTOP CHECK: Primary | ICD-10-CM

## 2023-01-09 PROCEDURE — 3078F PR MOST RECENT DIASTOLIC BLOOD PRESSURE < 80 MM HG: ICD-10-PCS | Mod: CPTII,,, | Performed by: OBSTETRICS & GYNECOLOGY

## 2023-01-09 PROCEDURE — 3078F DIAST BP <80 MM HG: CPT | Mod: CPTII,,, | Performed by: OBSTETRICS & GYNECOLOGY

## 2023-01-09 PROCEDURE — 3074F SYST BP LT 130 MM HG: CPT | Mod: CPTII,,, | Performed by: OBSTETRICS & GYNECOLOGY

## 2023-01-09 PROCEDURE — 1159F MED LIST DOCD IN RCRD: CPT | Mod: CPTII,,, | Performed by: OBSTETRICS & GYNECOLOGY

## 2023-01-09 PROCEDURE — 3074F PR MOST RECENT SYSTOLIC BLOOD PRESSURE < 130 MM HG: ICD-10-PCS | Mod: CPTII,,, | Performed by: OBSTETRICS & GYNECOLOGY

## 2023-01-09 PROCEDURE — 3008F BODY MASS INDEX DOCD: CPT | Mod: CPTII,,, | Performed by: OBSTETRICS & GYNECOLOGY

## 2023-01-09 PROCEDURE — 99999 PR PBB SHADOW E&M-EST. PATIENT-LVL III: ICD-10-PCS | Mod: PBBFAC,,, | Performed by: OBSTETRICS & GYNECOLOGY

## 2023-01-09 PROCEDURE — 1160F PR REVIEW ALL MEDS BY PRESCRIBER/CLIN PHARMACIST DOCUMENTED: ICD-10-PCS | Mod: CPTII,,, | Performed by: OBSTETRICS & GYNECOLOGY

## 2023-01-09 PROCEDURE — 1160F RVW MEDS BY RX/DR IN RCRD: CPT | Mod: CPTII,,, | Performed by: OBSTETRICS & GYNECOLOGY

## 2023-01-09 PROCEDURE — 99499 UNLISTED E&M SERVICE: CPT | Mod: S$PBB,,, | Performed by: OBSTETRICS & GYNECOLOGY

## 2023-01-09 PROCEDURE — 99499 NO LOS: ICD-10-PCS | Mod: S$PBB,,, | Performed by: OBSTETRICS & GYNECOLOGY

## 2023-01-09 PROCEDURE — 99999 PR PBB SHADOW E&M-EST. PATIENT-LVL III: CPT | Mod: PBBFAC,,, | Performed by: OBSTETRICS & GYNECOLOGY

## 2023-01-09 PROCEDURE — 3008F PR BODY MASS INDEX (BMI) DOCUMENTED: ICD-10-PCS | Mod: CPTII,,, | Performed by: OBSTETRICS & GYNECOLOGY

## 2023-01-09 PROCEDURE — 99213 OFFICE O/P EST LOW 20 MIN: CPT | Mod: PBBFAC | Performed by: OBSTETRICS & GYNECOLOGY

## 2023-01-09 PROCEDURE — 1159F PR MEDICATION LIST DOCUMENTED IN MEDICAL RECORD: ICD-10-PCS | Mod: CPTII,,, | Performed by: OBSTETRICS & GYNECOLOGY

## 2023-01-09 RX ORDER — NYSTATIN AND TRIAMCINOLONE ACETONIDE 100000; 1 [USP'U]/G; MG/G
CREAM TOPICAL 2 TIMES DAILY
Qty: 60 G | Refills: 2 | Status: SHIPPED | OUTPATIENT
Start: 2023-01-09

## 2023-01-09 NOTE — PROGRESS NOTES
"[facility]  Ambulatory Clinic  Obstetrics & Gynecology    Visit Date:  2023    Chief Complaint:  Post-op visit    Subjective:      Pilar Hernandez is a 37 y.o.  here for post-op visit.  Pt is s/p laparoscopic BTL with bilateral salpingectomy.  Pt states she is doing well since her surgery and has no major complaints today.  Pt has resumed regular activities and states she pleased with surgical results.  Pt denies any vaginal bleeding, discharge, pain, incisional problems, GI or  complaints.      Review of Systems:      Constitutional:  No fever, fatigue  HENT:  No congestion, hearing changes  Eyes:  No visual disturbance  Respiratory:  No cough, shortness of breath  Cardiovascular:  No chest pain, leg swelling  Gastrointestinal:  No abdominal pain, constipation, blood in stool   Genitourinary:  No dysuria, frequency  Skin:  No rash, jaundice  Neurological:  No dizziness, weakness, headaches    Objective:     /78   Ht 5' 7" (1.702 m)   Wt 106.4 kg (234 lb 7.4 oz)   LMP 2023 Comment: pt had tubal  BMI 36.72 kg/m²      GENERAL:  NAD, well-nourished  HEENT:  NCAT, moist mucus membranes.   LUNGS:  Clear normal respiratory effort  HEART:  RRR  ABDOMEN:  Soft, non-tender, non-distended. Normoactive BS. No obvious organomegaly.   Laparoscopic incisions well healed.  EXT:  Symmetric w/o cramping, claudication, or edema. +2 distal pulses.  SKIN:  No rashes or bruising  NEURO:  Grossly intact bilaterally  PSYCH:  Mood and affect appropriate    PELVIC:  Female external genitalia w/o any obvious lesions. Female hair distribution. Adequate perineal body. Normal urethral meatus. No gross lymphadenopathy.   VAGINA:  Pink, moist, well-rugated. Good support. No obvious lesion. No discharge.  CERVIX:  No cervical motion tenderness, discharge, or obvious lesions.   UTERUS:  Small, non-tender, normal contour  ADNEXA:  No masses, non-tender    RECTAL:  Declined. No obvious " external lesions    Chaperone present for exam.    Laboratory:    Lab Results   Component Value Date    WBC 6.14 2022    HGB 11.5 (L) 2022    HCT 36.2 (L) 2022    MCV 81 (L) 2022     2022     Surgical pathology:    Final Pathologic Diagnosis RELIAPATH DIAGNOSIS:   BILATERAL FALLOPIAN TUBE SEGMENTS, TUBAL LIGATION:   - Exhibiting the usual normal luminal/muscular wall histology. No significant   pathological changes.  (Complete cross section)      Surgical pathology reviewed with pt.    Assessment:    37 y.o.  s/p laparoscopic BTL with bilateral salpingectomy - doing well.    Plan:    Post-op care instructions and precautions reviewed.  Surgical findings and pathology discussed with pt.    Pt requesting refill of mycolog 2 for heat rash.  Hygiene advice.  Encourage healthy lifestyle modifications.  F/u with PCP for health maintenance.    Return in 2023 for annual GYN exam, or sooner for any concerns.  All of her questions were answered to her satisfactions, pt voiced understanding.       Julio Cesar Elias MD

## 2023-04-04 ENCOUNTER — LAB VISIT (OUTPATIENT)
Dept: LAB | Facility: HOSPITAL | Age: 38
End: 2023-04-04
Attending: INTERNAL MEDICINE
Payer: MEDICAID

## 2023-04-04 DIAGNOSIS — Z00.00 ANNUAL PHYSICAL EXAM: ICD-10-CM

## 2023-04-04 DIAGNOSIS — R42 DIZZINESS: ICD-10-CM

## 2023-04-04 LAB
ALBUMIN SERPL BCP-MCNC: 3.9 G/DL (ref 3.5–5.2)
ALP SERPL-CCNC: 61 U/L (ref 55–135)
ALT SERPL W/O P-5'-P-CCNC: 9 U/L (ref 10–44)
ANION GAP SERPL CALC-SCNC: 11 MMOL/L (ref 8–16)
AST SERPL-CCNC: 16 U/L (ref 10–40)
BASOPHILS # BLD AUTO: 0.03 K/UL (ref 0–0.2)
BASOPHILS NFR BLD: 0.7 % (ref 0–1.9)
BILIRUB SERPL-MCNC: 0.4 MG/DL (ref 0.1–1)
BUN SERPL-MCNC: 8 MG/DL (ref 6–20)
CALCIUM SERPL-MCNC: 8.9 MG/DL (ref 8.7–10.5)
CHLORIDE SERPL-SCNC: 106 MMOL/L (ref 95–110)
CHOLEST SERPL-MCNC: 156 MG/DL (ref 120–199)
CHOLEST/HDLC SERPL: 3.1 {RATIO} (ref 2–5)
CO2 SERPL-SCNC: 26 MMOL/L (ref 23–29)
CREAT SERPL-MCNC: 0.8 MG/DL (ref 0.5–1.4)
DIFFERENTIAL METHOD: ABNORMAL
EOSINOPHIL # BLD AUTO: 0 K/UL (ref 0–0.5)
EOSINOPHIL NFR BLD: 0.7 % (ref 0–8)
ERYTHROCYTE [DISTWIDTH] IN BLOOD BY AUTOMATED COUNT: 15.2 % (ref 11.5–14.5)
EST. GFR  (NO RACE VARIABLE): >60 ML/MIN/1.73 M^2
GLUCOSE SERPL-MCNC: 87 MG/DL (ref 70–110)
HCT VFR BLD AUTO: 35.7 % (ref 37–48.5)
HDLC SERPL-MCNC: 50 MG/DL (ref 40–75)
HDLC SERPL: 32.1 % (ref 20–50)
HGB BLD-MCNC: 11.4 G/DL (ref 12–16)
IMM GRANULOCYTES # BLD AUTO: 0.02 K/UL (ref 0–0.04)
IMM GRANULOCYTES NFR BLD AUTO: 0.5 % (ref 0–0.5)
LDLC SERPL CALC-MCNC: 96.2 MG/DL (ref 63–159)
LYMPHOCYTES # BLD AUTO: 1.6 K/UL (ref 1–4.8)
LYMPHOCYTES NFR BLD: 36.7 % (ref 18–48)
MCH RBC QN AUTO: 26.3 PG (ref 27–31)
MCHC RBC AUTO-ENTMCNC: 31.9 G/DL (ref 32–36)
MCV RBC AUTO: 82 FL (ref 82–98)
MONOCYTES # BLD AUTO: 0.3 K/UL (ref 0.3–1)
MONOCYTES NFR BLD: 6.8 % (ref 4–15)
NEUTROPHILS # BLD AUTO: 2.3 K/UL (ref 1.8–7.7)
NEUTROPHILS NFR BLD: 54.6 % (ref 38–73)
NONHDLC SERPL-MCNC: 106 MG/DL
NRBC BLD-RTO: 0 /100 WBC
PLATELET # BLD AUTO: 257 K/UL (ref 150–450)
PMV BLD AUTO: 11.4 FL (ref 9.2–12.9)
POTASSIUM SERPL-SCNC: 4 MMOL/L (ref 3.5–5.1)
PROT SERPL-MCNC: 7.4 G/DL (ref 6–8.4)
RBC # BLD AUTO: 4.34 M/UL (ref 4–5.4)
SODIUM SERPL-SCNC: 143 MMOL/L (ref 136–145)
TRIGL SERPL-MCNC: 49 MG/DL (ref 30–150)
TSH SERPL DL<=0.005 MIU/L-ACNC: 1.44 UIU/ML (ref 0.4–4)
WBC # BLD AUTO: 4.28 K/UL (ref 3.9–12.7)

## 2023-04-04 PROCEDURE — 83036 HEMOGLOBIN GLYCOSYLATED A1C: CPT | Performed by: INTERNAL MEDICINE

## 2023-04-04 PROCEDURE — 82746 ASSAY OF FOLIC ACID SERUM: CPT | Performed by: INTERNAL MEDICINE

## 2023-04-04 PROCEDURE — 80053 COMPREHEN METABOLIC PANEL: CPT | Performed by: INTERNAL MEDICINE

## 2023-04-04 PROCEDURE — 80061 LIPID PANEL: CPT | Performed by: INTERNAL MEDICINE

## 2023-04-04 PROCEDURE — 36415 COLL VENOUS BLD VENIPUNCTURE: CPT | Performed by: INTERNAL MEDICINE

## 2023-04-04 PROCEDURE — 85025 COMPLETE CBC W/AUTO DIFF WBC: CPT | Performed by: INTERNAL MEDICINE

## 2023-04-04 PROCEDURE — 82607 VITAMIN B-12: CPT | Performed by: INTERNAL MEDICINE

## 2023-04-04 PROCEDURE — 84443 ASSAY THYROID STIM HORMONE: CPT | Performed by: INTERNAL MEDICINE

## 2023-04-05 LAB
ESTIMATED AVG GLUCOSE: 108 MG/DL (ref 68–131)
FOLATE SERPL-MCNC: 11.8 NG/ML (ref 4–24)
HBA1C MFR BLD: 5.4 % (ref 4–5.6)
VIT B12 SERPL-MCNC: 492 PG/ML (ref 210–950)

## 2023-05-26 ENCOUNTER — PATIENT MESSAGE (OUTPATIENT)
Dept: ADMINISTRATIVE | Facility: OTHER | Age: 38
End: 2023-05-26
Payer: MEDICAID

## 2023-08-25 PROBLEM — O10.919 CHRONIC HYPERTENSION DURING PREGNANCY, ANTEPARTUM: Status: RESOLVED | Noted: 2022-04-09 | Resolved: 2023-08-25

## 2023-10-07 NOTE — PROGRESS NOTES
28w5d here for OB visit.  No major complaints today.  Reports active fetus.  Denies vaginal bleeding, leakage of fluid, or contractions.  1 hr glucola normal.  Mild anemia, start fergon/colace.  Labor/preE precautions.  Kick counts.  Return 2 wks.  Voiced understanding.  Significant other present for visit.  
No

## 2024-03-01 ENCOUNTER — OFFICE VISIT (OUTPATIENT)
Dept: OBSTETRICS AND GYNECOLOGY | Facility: CLINIC | Age: 39
End: 2024-03-01
Payer: MEDICAID

## 2024-03-01 VITALS
BODY MASS INDEX: 36.88 KG/M2 | HEIGHT: 67 IN | WEIGHT: 235 LBS | SYSTOLIC BLOOD PRESSURE: 110 MMHG | DIASTOLIC BLOOD PRESSURE: 78 MMHG

## 2024-03-01 DIAGNOSIS — Z01.419 WELL WOMAN EXAM WITH ROUTINE GYNECOLOGICAL EXAM: Primary | ICD-10-CM

## 2024-03-01 DIAGNOSIS — Z12.4 CERVICAL CANCER SCREENING: ICD-10-CM

## 2024-03-01 PROBLEM — Z30.2 ENCOUNTER FOR FEMALE STERILIZATION PROCEDURE: Status: RESOLVED | Noted: 2022-12-02 | Resolved: 2024-03-01

## 2024-03-01 PROCEDURE — 99459 PELVIC EXAMINATION: CPT | Mod: S$PBB,,, | Performed by: OBSTETRICS & GYNECOLOGY

## 2024-03-01 PROCEDURE — 1160F RVW MEDS BY RX/DR IN RCRD: CPT | Mod: CPTII,,, | Performed by: OBSTETRICS & GYNECOLOGY

## 2024-03-01 PROCEDURE — 99395 PREV VISIT EST AGE 18-39: CPT | Mod: S$PBB,,, | Performed by: OBSTETRICS & GYNECOLOGY

## 2024-03-01 PROCEDURE — 3074F SYST BP LT 130 MM HG: CPT | Mod: CPTII,,, | Performed by: OBSTETRICS & GYNECOLOGY

## 2024-03-01 PROCEDURE — 87624 HPV HI-RISK TYP POOLED RSLT: CPT | Performed by: OBSTETRICS & GYNECOLOGY

## 2024-03-01 PROCEDURE — 1159F MED LIST DOCD IN RCRD: CPT | Mod: CPTII,,, | Performed by: OBSTETRICS & GYNECOLOGY

## 2024-03-01 PROCEDURE — 3078F DIAST BP <80 MM HG: CPT | Mod: CPTII,,, | Performed by: OBSTETRICS & GYNECOLOGY

## 2024-03-01 PROCEDURE — 88175 CYTOPATH C/V AUTO FLUID REDO: CPT | Performed by: PATHOLOGY

## 2024-03-01 PROCEDURE — 3008F BODY MASS INDEX DOCD: CPT | Mod: CPTII,,, | Performed by: OBSTETRICS & GYNECOLOGY

## 2024-03-01 PROCEDURE — 99999 PR PBB SHADOW E&M-EST. PATIENT-LVL III: CPT | Mod: PBBFAC,,, | Performed by: OBSTETRICS & GYNECOLOGY

## 2024-03-01 PROCEDURE — 88141 CYTOPATH C/V INTERPRET: CPT | Mod: ,,, | Performed by: PATHOLOGY

## 2024-03-01 PROCEDURE — 99459 PELVIC EXAMINATION: CPT | Mod: PBBFAC | Performed by: OBSTETRICS & GYNECOLOGY

## 2024-03-01 PROCEDURE — 99213 OFFICE O/P EST LOW 20 MIN: CPT | Mod: PBBFAC | Performed by: OBSTETRICS & GYNECOLOGY

## 2024-03-01 NOTE — PROGRESS NOTES
"[facility]  Ambulatory Clinic  Obstetrics & Gynecology    Visit Date:  3/1/2024    Chief Complaint:  Annual GYN exam    History of Present Illness:      Pilar Hernandez is a 39 y.o.  here for a gynecologic exam.    Pt has no major complaints today.    Menses are regular, not heavy or painful.  Pt current method of family planning is BTL, and reports no problems with this method.    Last pap ~2019 was benign.  Pt denies abnormal vaginal bleeding, vaginal discharge, dysmenorrhea, dyspareunia, pelvic pain, bloating, early satiety, unintentional weight loss, breast mass/skin changes, incontinence, GI or urinary complaints.    Otherwise, the pt is in her usual state of health.    Past History:  Gynecologic history as noted above.    Review of Systems:      GENERAL:  No fever, fatigue, excessive weight gain or loss  HEENT:  No headaches, hearing changes, visual disturbance  RESPIRATORY:  No cough, shortness of breath  CARDIOVASCULAR:  No chest pain, heart palpitations, leg swelling  BREAST:  No lump, pain, nipple discharge, skin changes  GASTROINTESTINAL:  No nausea, vomiting, constipation, diarrhea, abd pain, rectal bleeding   GENITOURINARY:  See HPI  ENDOCRINE:  No heat or cold intolerance  HEMATOLOGIC:  No easy bruisability or bleeding   LYMPHATICS:  No enlarged nodes  MUSCULOSKELETAL:  No acute joint pain or swelling  SKIN:  No rash, lesions, jaundice  NEUROLOGIC:  No dizziness, weakness, syncope  PSYCHIATRIC:  No significant mood changes, homicidal/suicidal ideations, abuse    Physical Exam:     /78   Ht 5' 7" (1.702 m)   Wt 106.6 kg (235 lb 0.2 oz)   LMP 2024   BMI 36.81 kg/m²   Pulse 60's, Resp rate 12     GENERAL:  No acute distress, well-nourished  HEENT:  Atraumatic, anicteric, moist mucus membranes. Neck supple w/o masses.  BREAST:  Symmetric, nontender, no obvious masses, adenopathy, skin changes or nipple discharge.  LUNGS:  Clear normal respiratory " effort  HEART:  Regular rate and rhythm, no murmurs, gallops, or rubs  ABDOMEN:  Soft, non-tender, non-distended, normoactive bowel sounds, no obvious organomegaly  EXT:  Symmetric w/o cramping, claudication, or edema. +2 distal pulses.  SKIN:  No rashes or bruising  PSYCH:  Mood and affect appropriate  NEURO:  Grossly intact bilaterally    GENITOURINARY:    VULVAR:  Female external genitalia w/o obvious lesions. Female hair distribution. Normal urethral meatus. No gross lymphadenopathy.    VAGINA:  Normal vaginal mucosa. Good support. No obvious lesion. No discharge.  CERVIX:  No cervical motion tenderness, discharge, or obvious lesions.   UTERUS:  Small, non-tender, normal contour  ADNEXA:  No masses, non-tender    RECTAL:  Deferred. No obvious external lesions    Chaperone present for exam.    Assessment:     39 y.o.  with h/o BTL:    Well woman gynecologic exam    Plan:    A gynecologic health assessment was performed with age appropriate counseling.  Cervical cancer screening - pap obtained.  Encourage healthy lifestyle modifications, monthly self breast exams, and f/u with PCP for health maintenance.  Return 1 year for gynecologic exam or sooner as needed.    All questions answered, pt voiced understanding.        Julio Cesar Elias MD

## 2024-03-07 ENCOUNTER — PATIENT MESSAGE (OUTPATIENT)
Dept: ADMINISTRATIVE | Facility: OTHER | Age: 39
End: 2024-03-07
Payer: MEDICAID

## 2024-03-11 LAB
FINAL PATHOLOGIC DIAGNOSIS: ABNORMAL
Lab: ABNORMAL

## 2024-03-12 LAB
HPV HR 12 DNA SPEC QL NAA+PROBE: NEGATIVE
HPV16 AG SPEC QL: NEGATIVE
HPV18 DNA SPEC QL NAA+PROBE: NEGATIVE

## 2024-03-15 ENCOUNTER — TELEPHONE (OUTPATIENT)
Dept: OBSTETRICS AND GYNECOLOGY | Facility: CLINIC | Age: 39
End: 2024-03-15
Payer: MEDICAID

## 2024-03-15 NOTE — TELEPHONE ENCOUNTER
----- Message from Julio Cesar Elias MD sent at 3/12/2024  1:30 PM CDT -----  Please notify pt her pap smear was ABNORMAL.  Schedule pt for a colposcopy AND EMB.  Timely f/u strongly advised.  Thanks

## 2024-03-20 ENCOUNTER — PROCEDURE VISIT (OUTPATIENT)
Dept: OBSTETRICS AND GYNECOLOGY | Facility: CLINIC | Age: 39
End: 2024-03-20
Payer: MEDICAID

## 2024-03-20 VITALS
SYSTOLIC BLOOD PRESSURE: 118 MMHG | BODY MASS INDEX: 37.31 KG/M2 | WEIGHT: 238.19 LBS | DIASTOLIC BLOOD PRESSURE: 80 MMHG

## 2024-03-20 DIAGNOSIS — R87.619 ATYPICAL GLANDULAR CELLS ON CERVICAL PAP SMEAR: Primary | ICD-10-CM

## 2024-03-20 PROCEDURE — 88305 TISSUE EXAM BY PATHOLOGIST: CPT | Mod: 26,,, | Performed by: PATHOLOGY

## 2024-03-20 PROCEDURE — 58100 BIOPSY OF UTERUS LINING: CPT | Mod: PBBFAC | Performed by: OBSTETRICS & GYNECOLOGY

## 2024-03-20 PROCEDURE — 58110 BX DONE W/COLPOSCOPY ADD-ON: CPT | Mod: S$PBB,,, | Performed by: OBSTETRICS & GYNECOLOGY

## 2024-03-20 PROCEDURE — 99499 UNLISTED E&M SERVICE: CPT | Mod: S$PBB,,, | Performed by: OBSTETRICS & GYNECOLOGY

## 2024-03-20 PROCEDURE — 88305 TISSUE EXAM BY PATHOLOGIST: CPT | Mod: 59 | Performed by: PATHOLOGY

## 2024-03-20 PROCEDURE — 57454 BX/CURETT OF CERVIX W/SCOPE: CPT | Mod: S$PBB,,, | Performed by: OBSTETRICS & GYNECOLOGY

## 2024-03-20 PROCEDURE — 57454 BX/CURETT OF CERVIX W/SCOPE: CPT | Mod: PBBFAC | Performed by: OBSTETRICS & GYNECOLOGY

## 2024-03-20 NOTE — PROCEDURES
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  for an EMB for further evaluation of atypical glandular cell on pap smear.      Consent:  We discussed the risks, benefits, alternatives, and possible complications to endometrial biopsy and all indicated procedures in detail.  All of her questions were answered to her satisfaction.  She voiced understanding and informed consent was obtained/signed in chart.    Vitals:  /80   Wt 108 kg (238 lb 3.3 oz)   LMP 2024   BMI 37.31 kg/m²     Physical Exam:      Abdomen soft, non-tender, no masses. External genitalia, vaginal wall and cervix without gross abnormality.  Bimanual exam reveals a small week size, non-tender, mid-plain uterus, without adnexal mass or tenderness.     Procedure Details:  A time out was performed to confirmed the correct patient and procedure.  A speculum was placed and the cervix was prepped with betadine.  A single tooth tenaculum was applied to the cervix for stabilization.   Uterus sounded to 7 cm.  A pipelle was used to sample the endometrium.  A moderate amount of tissue was obtained.  Sample was sent for pathologic examination.  Tenaculum removed and pressure applied with cotton ball and ring forceps until hemostasis achieved.  Patient tolerated the procedure well.  Sterile technique maintained.  Hemostasis noted.      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    Clinical findings and expectations discussed.    Usual post procedure warnings and aftercare instructions reviewed.  Pt was advised to call for any fever or for prolonged or severe pain or bleeding.  She was advised to use OTC analgesics as needed for  mild to moderate pain.    Pt was instructed to call office in 1 to 2 weeks for results and schedule follow-up as needed.    All of her questions were answered to her satisfaction, pt voiced understanding.       Julio Cesar Elias MD

## 2024-03-20 NOTE — PROCEDURES
[facility]  Ambulatory Clinic   Obstetrics & Gynecology    Colposcopy Procedure Note  (CPT 49595)    Date:  3/20/2024    Contraception:  Tubal ligation     LMP:  24    Indications:  Pilar Hernandez is a 39 y.o.  who presents for colposcopy secondary to atypical glandular cell on pap smear.    Vitals:  /80   Wt 108 kg (238 lb 3.3 oz)   LMP 2024   BMI 37.31 kg/m²     Procedure Details:     The risks and benefits of the procedure discussed and written informed consent obtained.  All questions were answered prior to the initiation of the procedure.  A time out was performed to identify the correct patient and procedure.  Speculum placed in vagina and excellent visualization of cervix achieved, cervix swabbed x 3 with acetic acid solution.  Ectocervical biopsy was performed of the described lesion.  ECC performed.  Specimens labelled and sent to Pathology.  Hemostasis was achieved with pressure.  We then proceeded with EMB, see separate note.  All instruments removed.  The patient tolerated the procedure well.  Sterile technique maintained.      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  Will base further treatment on Pathology findings.  Treatment options discussed with patient.  If her bx results is HALEY 1 or less, will repeat cotesting in 1 year.   If her bx results is HALEY 2 or greater, will schedule pt for f/u apt to discuss further treatment options.  Usual post procedural instructions and precautions reviewed.    Report excessive pain or bleeding to the office as soon as possible.    Patient was  instructed to call office in 1 to 2 weeks for results and schedule f/u apt accordingly.     Importance of follow up stressed.    All of her questions answered, pt voiced understanding.      Julio Cesar Elias MD

## 2024-03-26 LAB
COMMENT: NORMAL
FINAL PATHOLOGIC DIAGNOSIS: NORMAL
GROSS: NORMAL
Lab: NORMAL

## 2024-04-29 ENCOUNTER — OFFICE VISIT (OUTPATIENT)
Facility: CLINIC | Age: 39
End: 2024-04-29
Payer: MEDICAID

## 2024-04-29 VITALS
OXYGEN SATURATION: 97 % | BODY MASS INDEX: 35.7 KG/M2 | HEART RATE: 77 BPM | WEIGHT: 235.56 LBS | SYSTOLIC BLOOD PRESSURE: 122 MMHG | DIASTOLIC BLOOD PRESSURE: 80 MMHG | RESPIRATION RATE: 18 BRPM | TEMPERATURE: 98 F | HEIGHT: 68 IN

## 2024-04-29 DIAGNOSIS — M79.605 LEFT LEG PAIN: ICD-10-CM

## 2024-04-29 DIAGNOSIS — Z00.00 ANNUAL PHYSICAL EXAM: Primary | ICD-10-CM

## 2024-04-29 PROCEDURE — 3074F SYST BP LT 130 MM HG: CPT | Mod: CPTII,,,

## 2024-04-29 PROCEDURE — 1159F MED LIST DOCD IN RCRD: CPT | Mod: CPTII,,,

## 2024-04-29 PROCEDURE — 99214 OFFICE O/P EST MOD 30 MIN: CPT | Mod: PBBFAC,PN

## 2024-04-29 PROCEDURE — 1160F RVW MEDS BY RX/DR IN RCRD: CPT | Mod: CPTII,,,

## 2024-04-29 PROCEDURE — 99999 PR PBB SHADOW E&M-EST. PATIENT-LVL IV: CPT | Mod: PBBFAC,,,

## 2024-04-29 PROCEDURE — 99204 OFFICE O/P NEW MOD 45 MIN: CPT | Mod: S$PBB,,,

## 2024-04-29 PROCEDURE — 3008F BODY MASS INDEX DOCD: CPT | Mod: CPTII,,,

## 2024-04-29 PROCEDURE — 3079F DIAST BP 80-89 MM HG: CPT | Mod: CPTII,,,

## 2024-04-29 RX ORDER — IBUPROFEN 800 MG/1
800 TABLET ORAL EVERY 6 HOURS PRN
Qty: 30 TABLET | Refills: 1 | Status: SHIPPED | OUTPATIENT
Start: 2024-04-29

## 2024-04-29 NOTE — PROGRESS NOTES
SUBJECTIVE     Chief Complaint   Patient presents with    Annual Exam    Leg Pain     Left x1 wk     Shoulder Pain     Rt x months       HPI  Pilar Hernandez is a 39 y.o. female with multiple medical diagnoses as listed in the medical history and problem list that presents for annual exam. Pt has been doing well since her last visit. She has a good appetite and eats well. She does exercise. She sleeps for ~8 hours nightly. Pt does take OTC supplements. She does not have any current stressors. Pt is UTD on age appropriate CA screening.    Leg Pain   There was no injury mechanism. The pain is present in the left leg. The quality of the pain is described as aching. The pain is at a severity of 4/10. The pain is moderate. The pain has been Intermittent since onset. Pertinent negatives include no inability to bear weight, loss of motion, loss of sensation, numbness or tingling. She has tried nothing for the symptoms.       PAST MEDICAL HISTORY:  Past Medical History:   Diagnosis Date    Anemia     Hypertension     Infertility        PAST SURGICAL HISTORY:  Past Surgical History:   Procedure Laterality Date    LAPAROSCOPIC LIGATION OF FALLOPIAN TUBE Bilateral 12/02/2022    Procedure: LIGATION, FALLOPIAN TUBE, LAPAROSCOPIC;  Surgeon: Julio Cesar Elias MD;  Location: [facility];  Service: OB/GYN;  Laterality: Bilateral;  RN PREOP 11/29/22--T/S DONE--- UPT ON ARRIVAL    TUBAL LIGATION      verticle  sleeve  01/01/2013    patient lost 109# Dr. Carlos Bowles        SOCIAL HISTORY:  Social History     Socioeconomic History    Marital status:    Tobacco Use    Smoking status: Never    Smokeless tobacco: Never   Substance and Sexual Activity    Alcohol use: Not Currently     Comment: prior to pregnancy    Drug use: No    Sexual activity: Yes     Partners: Male     Birth control/protection: None, See Surgical Hx     Comment: TUBAL LIGATION     Social Determinants of Health     Financial Resource Strain: Low Risk   (3/1/2024)    Overall Financial Resource Strain (CARDIA)     Difficulty of Paying Living Expenses: Not hard at all   Food Insecurity: No Food Insecurity (3/1/2024)    Hunger Vital Sign     Worried About Running Out of Food in the Last Year: Never true     Ran Out of Food in the Last Year: Never true   Transportation Needs: No Transportation Needs (3/1/2024)    PRAPARE - Transportation     Lack of Transportation (Medical): No     Lack of Transportation (Non-Medical): No   Physical Activity: Unknown (3/1/2024)    Exercise Vital Sign     Days of Exercise per Week: 3 days   Recent Concern: Physical Activity - Insufficiently Active (3/1/2024)    Exercise Vital Sign     Days of Exercise per Week: 3 days     Minutes of Exercise per Session: 40 min   Stress: No Stress Concern Present (3/1/2024)    [place] [employer] - Occupational Stress Questionnaire     Feeling of Stress : Only a little   Social Connections: Unknown (3/1/2024)    Social Connection and Isolation Panel [NHANES]     Frequency of Communication with Friends and Family: More than three times a week     Frequency of Social Gatherings with Friends and Family: More than three times a week     Active Member of Clubs or Organizations: No     Attends Club or Organization Meetings: 1 to 4 times per year     Marital Status:    Housing Stability: Low Risk  (3/1/2024)    Housing Stability Vital Sign     Unable to Pay for Housing in the Last Year: No     Number of Places Lived in the Last Year: 1     Unstable Housing in the Last Year: No       FAMILY HISTORY:  Family History   Problem Relation Name Age of Onset    Diabetes Mother      Hypertension Mother      Breast cancer Neg Hx      Cancer Neg Hx      Colon cancer Neg Hx      Eclampsia Neg Hx      Miscarriages / Stillbirths Neg Hx      Ovarian cancer Neg Hx       labor Neg Hx      Stroke Neg Hx         ALLERGIES AND MEDICATIONS: updated and reviewed.  Review of patient's allergies  "indicates:  No Known Allergies  Current Outpatient Medications   Medication Sig Dispense Refill    valsartan-hydrochlorothiazide (DIOVAN-HCT) 320-25 mg per tablet TAKE 1 TABLET BY MOUTH EVERY DAY 90 tablet 0    ibuprofen (ADVIL,MOTRIN) 800 MG tablet Take 1 tablet (800 mg total) by mouth every 6 (six) hours as needed for Pain. 30 tablet 1    nystatin (MYCOSTATIN) powder Apply topically 2 (two) times daily. (Patient not taking: Reported on 3/1/2024) 60 g 2    nystatin-triamcinolone (MYCOLOG II) cream Apply topically 2 (two) times a day. For heat rash. (Patient not taking: Reported on 11/8/2023) 60 g 2     No current facility-administered medications for this visit.       ROS  Review of Systems   Constitutional:  Negative for activity change, chills, fatigue, fever and unexpected weight change.   HENT:  Negative for hearing loss, rhinorrhea and trouble swallowing.    Eyes:  Negative for discharge and visual disturbance.   Respiratory:  Negative for chest tightness, shortness of breath and wheezing.    Cardiovascular:  Negative for chest pain and palpitations.   Gastrointestinal:  Negative for blood in stool, constipation, diarrhea and vomiting.   Endocrine: Negative for cold intolerance, heat intolerance, polydipsia, polyphagia and polyuria.   Genitourinary:  Negative for difficulty urinating, dysuria, hematuria and menstrual problem.        LMP 4/1/24   Musculoskeletal:  Negative for arthralgias, joint swelling and neck pain.   Skin:  Negative for rash.   Neurological:  Negative for dizziness, tingling, weakness, numbness and headaches.   Psychiatric/Behavioral:  Negative for confusion, dysphoric mood and sleep disturbance.          OBJECTIVE     Physical Exam  Vitals:    04/29/24 0950   BP: 122/80   Pulse: 77   Resp: 18   Temp: 97.5 °F (36.4 °C)    Body mass index is 35.82 kg/m².  Weight: 106.8 kg (235 lb 9 oz)   Height: 5' 8" (172.7 cm)     Physical Exam  Vitals reviewed.   Constitutional:       General: She is not " in acute distress.     Appearance: Normal appearance.   HENT:      Head: Normocephalic and atraumatic.      Right Ear: Tympanic membrane, ear canal and external ear normal.      Left Ear: Tympanic membrane, ear canal and external ear normal.      Nose: Nose normal.      Mouth/Throat:      Mouth: Mucous membranes are moist.   Eyes:      General: No scleral icterus.     Extraocular Movements: Extraocular movements intact.      Conjunctiva/sclera: Conjunctivae normal.      Pupils: Pupils are equal, round, and reactive to light.   Cardiovascular:      Rate and Rhythm: Normal rate and regular rhythm.      Pulses: Normal pulses.           Dorsalis pedis pulses are 2+ on the right side and 2+ on the left side.        Posterior tibial pulses are 2+ on the right side and 2+ on the left side.      Heart sounds: Normal heart sounds.   Pulmonary:      Effort: Pulmonary effort is normal.      Breath sounds: Normal breath sounds.   Abdominal:      General: There is no distension.      Palpations: Abdomen is soft.      Tenderness: There is no abdominal tenderness.   Musculoskeletal:         General: No swelling. Normal range of motion.      Cervical back: Normal range of motion and neck supple.      Right upper leg: Normal.      Left upper leg: No swelling, edema, deformity or tenderness.      Right lower leg: No edema.      Left lower leg: No edema.        Legs:       Comments: Pt reports left posterior leg pain 4/10, characterizes as 4/10. Pt states she uses treadmill and stair master regularly, Denies stretching before and after workouts,   Lymphadenopathy:      Cervical: No cervical adenopathy.   Skin:     General: Skin is warm and dry.      Capillary Refill: Capillary refill takes less than 2 seconds.      Findings: No rash.   Neurological:      General: No focal deficit present.      Mental Status: She is alert and oriented to person, place, and time.      Gait: Gait is intact.   Psychiatric:         Mood and Affect: Mood  normal.         Behavior: Behavior normal.           Health Maintenance         Date Due Completion Date    Influenza Vaccine (1) 09/01/2023 12/11/2020    COVID-19 Vaccine (5 - 2023-24 season) 09/01/2023 5/3/2023    Hemoglobin A1c (Diabetic Prevention Screening) 04/04/2026 4/4/2023    Cervical Cancer Screening 03/01/2029 3/1/2024    TETANUS VACCINE 10/13/2030 10/13/2020              ASSESSMENT     39 y.o. female with     1. Annual physical exam    2. Left leg pain        PLAN:     1. Annual physical exam  - Discussed age and gender appropriate screenings at this visit and encouraged a healthy diet low in simple carbohydrates, and increased physical activity.  Counseled on medically appropriate vaccines based on age and current health condition.  Screening test reviewed and discussed with patient.    - Hemoglobin A1C; Future  - Lipid Panel; Future  - TSH; Future  - Comprehensive Metabolic Panel; Future  - CBC Auto Differential; Future    2. Left leg pain  Pt encouraged to apply ice packs 2-3 times daily at 10 minute intervals x 72 hours, then okay to change to heating compress with care not to burn her self; she  voiced understanding    Discussed stretching techniques and provided handouts, pt verb understanding.  - ibuprofen (ADVIL,MOTRIN) 800 MG tablet; Take 1 tablet (800 mg total) by mouth every 6 (six) hours as needed for Pain.  Dispense: 30 tablet; Refill: 1        RTC in      Owen Oliveira [facility]-  04/29/2024 9:59 AM        Follow up in about 2 weeks (around 5/13/2024).

## 2024-04-29 NOTE — PATIENT INSTRUCTIONS
Pt encouraged to apply ice packs 2-3 times daily at 10 minute intervals x 72 hours, then okay to change to heating compress with care not to burn her self; she  voiced understanding

## 2024-04-30 ENCOUNTER — LAB VISIT (OUTPATIENT)
Dept: LAB | Facility: HOSPITAL | Age: 39
End: 2024-04-30
Attending: INTERNAL MEDICINE
Payer: MEDICAID

## 2024-04-30 DIAGNOSIS — Z00.00 ANNUAL PHYSICAL EXAM: ICD-10-CM

## 2024-04-30 LAB
ALBUMIN SERPL BCP-MCNC: 3.9 G/DL (ref 3.5–5.2)
ALP SERPL-CCNC: 58 U/L (ref 55–135)
ALT SERPL W/O P-5'-P-CCNC: 9 U/L (ref 10–44)
ANION GAP SERPL CALC-SCNC: 6 MMOL/L (ref 8–16)
AST SERPL-CCNC: 14 U/L (ref 10–40)
BASOPHILS # BLD AUTO: 0.03 K/UL (ref 0–0.2)
BASOPHILS NFR BLD: 0.7 % (ref 0–1.9)
BILIRUB SERPL-MCNC: 0.7 MG/DL (ref 0.1–1)
BUN SERPL-MCNC: 16 MG/DL (ref 6–20)
CALCIUM SERPL-MCNC: 9.5 MG/DL (ref 8.7–10.5)
CHLORIDE SERPL-SCNC: 106 MMOL/L (ref 95–110)
CHOLEST SERPL-MCNC: 163 MG/DL (ref 120–199)
CHOLEST/HDLC SERPL: 3.4 {RATIO} (ref 2–5)
CO2 SERPL-SCNC: 25 MMOL/L (ref 23–29)
CREAT SERPL-MCNC: 0.8 MG/DL (ref 0.5–1.4)
DIFFERENTIAL METHOD BLD: ABNORMAL
EOSINOPHIL # BLD AUTO: 0.1 K/UL (ref 0–0.5)
EOSINOPHIL NFR BLD: 1.2 % (ref 0–8)
ERYTHROCYTE [DISTWIDTH] IN BLOOD BY AUTOMATED COUNT: 14.1 % (ref 11.5–14.5)
EST. GFR  (NO RACE VARIABLE): >60 ML/MIN/1.73 M^2
ESTIMATED AVG GLUCOSE: 108 MG/DL (ref 68–131)
GLUCOSE SERPL-MCNC: 82 MG/DL (ref 70–110)
HBA1C MFR BLD: 5.4 % (ref 4–5.6)
HCT VFR BLD AUTO: 38.7 % (ref 37–48.5)
HDLC SERPL-MCNC: 48 MG/DL (ref 40–75)
HDLC SERPL: 29.4 % (ref 20–50)
HGB BLD-MCNC: 12.2 G/DL (ref 12–16)
IMM GRANULOCYTES # BLD AUTO: 0.01 K/UL (ref 0–0.04)
IMM GRANULOCYTES NFR BLD AUTO: 0.2 % (ref 0–0.5)
LDLC SERPL CALC-MCNC: 103.6 MG/DL (ref 63–159)
LYMPHOCYTES # BLD AUTO: 1.6 K/UL (ref 1–4.8)
LYMPHOCYTES NFR BLD: 37.6 % (ref 18–48)
MCH RBC QN AUTO: 25.8 PG (ref 27–31)
MCHC RBC AUTO-ENTMCNC: 31.5 G/DL (ref 32–36)
MCV RBC AUTO: 82 FL (ref 82–98)
MONOCYTES # BLD AUTO: 0.3 K/UL (ref 0.3–1)
MONOCYTES NFR BLD: 5.9 % (ref 4–15)
NEUTROPHILS # BLD AUTO: 2.3 K/UL (ref 1.8–7.7)
NEUTROPHILS NFR BLD: 54.4 % (ref 38–73)
NONHDLC SERPL-MCNC: 115 MG/DL
NRBC BLD-RTO: 0 /100 WBC
PLATELET # BLD AUTO: 238 K/UL (ref 150–450)
PMV BLD AUTO: 10.4 FL (ref 9.2–12.9)
POTASSIUM SERPL-SCNC: 4.1 MMOL/L (ref 3.5–5.1)
PROT SERPL-MCNC: 7.9 G/DL (ref 6–8.4)
RBC # BLD AUTO: 4.72 M/UL (ref 4–5.4)
SODIUM SERPL-SCNC: 137 MMOL/L (ref 136–145)
TRIGL SERPL-MCNC: 57 MG/DL (ref 30–150)
TSH SERPL DL<=0.005 MIU/L-ACNC: 2.06 UIU/ML (ref 0.4–4)
WBC # BLD AUTO: 4.26 K/UL (ref 3.9–12.7)

## 2024-04-30 PROCEDURE — 80061 LIPID PANEL: CPT

## 2024-04-30 PROCEDURE — 83036 HEMOGLOBIN GLYCOSYLATED A1C: CPT

## 2024-04-30 PROCEDURE — 85025 COMPLETE CBC W/AUTO DIFF WBC: CPT

## 2024-04-30 PROCEDURE — 84443 ASSAY THYROID STIM HORMONE: CPT

## 2024-04-30 PROCEDURE — 80053 COMPREHEN METABOLIC PANEL: CPT

## 2024-04-30 PROCEDURE — 36415 COLL VENOUS BLD VENIPUNCTURE: CPT

## 2024-05-14 ENCOUNTER — OFFICE VISIT (OUTPATIENT)
Facility: CLINIC | Age: 39
End: 2024-05-14
Payer: MEDICAID

## 2024-05-14 VITALS
TEMPERATURE: 98 F | DIASTOLIC BLOOD PRESSURE: 80 MMHG | HEART RATE: 64 BPM | OXYGEN SATURATION: 98 % | BODY MASS INDEX: 34.76 KG/M2 | SYSTOLIC BLOOD PRESSURE: 112 MMHG | RESPIRATION RATE: 18 BRPM | WEIGHT: 229.38 LBS | HEIGHT: 68 IN

## 2024-05-14 DIAGNOSIS — M79.605 LEFT LEG PAIN: Primary | ICD-10-CM

## 2024-05-14 DIAGNOSIS — E66.9 OBESITY (BMI 30.0-34.9): ICD-10-CM

## 2024-05-14 PROCEDURE — 3008F BODY MASS INDEX DOCD: CPT | Mod: CPTII,,,

## 2024-05-14 PROCEDURE — 99213 OFFICE O/P EST LOW 20 MIN: CPT | Mod: PBBFAC,PN

## 2024-05-14 PROCEDURE — 99999 PR PBB SHADOW E&M-EST. PATIENT-LVL III: CPT | Mod: PBBFAC,,,

## 2024-05-14 PROCEDURE — 1159F MED LIST DOCD IN RCRD: CPT | Mod: CPTII,,,

## 2024-05-14 PROCEDURE — 99213 OFFICE O/P EST LOW 20 MIN: CPT | Mod: S$PBB,,,

## 2024-05-14 PROCEDURE — 3044F HG A1C LEVEL LT 7.0%: CPT | Mod: CPTII,,,

## 2024-05-14 PROCEDURE — 3074F SYST BP LT 130 MM HG: CPT | Mod: CPTII,,,

## 2024-05-14 PROCEDURE — 3079F DIAST BP 80-89 MM HG: CPT | Mod: CPTII,,,

## 2024-05-14 NOTE — PROGRESS NOTES
SUBJECTIVE     Chief Complaint   Patient presents with    Follow-up     From leg px        HPI  Pilar Hernandez is a 39 y.o. female with multiple medical diagnoses as listed in the medical history and problem list that presents for follow-up     Leg Pain   The incident occurred more than 1 week ago. There was no injury mechanism. The pain is at a severity of 0/10. The patient is experiencing no pain. Nothing aggravates the symptoms. She has tried ice, heat and NSAIDs for the symptoms. The treatment provided significant relief.     Pt reports resolution of leg pain  PAST MEDICAL HISTORY:  Past Medical History:   Diagnosis Date    Anemia     Hypertension     Infertility        ALLERGIES AND MEDICATIONS: updated and reviewed.  Review of patient's allergies indicates:  No Known Allergies  Current Outpatient Medications   Medication Sig Dispense Refill    valsartan-hydrochlorothiazide (DIOVAN-HCT) 320-25 mg per tablet TAKE 1 TABLET BY MOUTH EVERY DAY 90 tablet 0    ibuprofen (ADVIL,MOTRIN) 800 MG tablet Take 1 tablet (800 mg total) by mouth every 6 (six) hours as needed for Pain. (Patient not taking: Reported on 5/14/2024) 30 tablet 1    nystatin (MYCOSTATIN) powder Apply topically 2 (two) times daily. (Patient not taking: Reported on 3/1/2024) 60 g 2    nystatin-triamcinolone (MYCOLOG II) cream Apply topically 2 (two) times a day. For heat rash. (Patient not taking: Reported on 11/8/2023) 60 g 2     No current facility-administered medications for this visit.       ROS  Review of Systems   Constitutional:  Negative for chills, fatigue and fever.   HENT:  Negative for rhinorrhea and sore throat.    Respiratory:  Negative for cough and shortness of breath.    Cardiovascular:  Negative for chest pain and palpitations.   Gastrointestinal:  Negative for constipation, diarrhea, nausea and vomiting.   Genitourinary:  Negative for dysuria.   Musculoskeletal:  Negative for joint swelling.   Skin:  Negative for rash  "and wound.   Neurological:  Negative for light-headedness and headaches.   Psychiatric/Behavioral:  Negative for dysphoric mood and sleep disturbance. The patient is not nervous/anxious.          OBJECTIVE     Physical Exam  Vitals:    05/14/24 1035   BP: 112/80   Pulse: 64   Resp: 18   Temp: 98.1 °F (36.7 °C)    Body mass index is 34.88 kg/m².  Weight: 104.1 kg (229 lb 6.2 oz)   Height: 5' 8" (172.7 cm)     Physical Exam  Vitals reviewed.   Constitutional:       General: She is not in acute distress.     Appearance: Normal appearance.   HENT:      Head: Normocephalic and atraumatic.      Right Ear: External ear normal.      Left Ear: External ear normal.      Nose: Nose normal.      Mouth/Throat:      Mouth: Mucous membranes are moist.   Eyes:      Extraocular Movements: Extraocular movements intact.      Conjunctiva/sclera: Conjunctivae normal.      Pupils: Pupils are equal, round, and reactive to light.   Cardiovascular:      Rate and Rhythm: Normal rate and regular rhythm.      Pulses: Normal pulses.      Heart sounds: Normal heart sounds.   Pulmonary:      Effort: Pulmonary effort is normal.   Abdominal:      General: There is no distension.      Palpations: Abdomen is soft.   Musculoskeletal:         General: No swelling or tenderness. Normal range of motion.      Cervical back: Normal range of motion.   Skin:     General: Skin is warm and dry.      Capillary Refill: Capillary refill takes less than 2 seconds.      Findings: No rash.   Neurological:      General: No focal deficit present.      Mental Status: She is alert and oriented to person, place, and time.   Psychiatric:         Mood and Affect: Mood normal.         Behavior: Behavior normal.           Health Maintenance         Date Due Completion Date    COVID-19 Vaccine (5 - 2023-24 season) 09/01/2023 5/3/2023    Influenza Vaccine (Season Ended) 09/01/2024 12/11/2020    Hemoglobin A1c (Diabetic Prevention Screening) 04/30/2027 4/30/2024    Cervical " Cancer Screening 03/01/2029 3/1/2024    TETANUS VACCINE 10/13/2030 10/13/2020              ASSESSMENT     39 y.o. female with     1. Left leg pain        PLAN:     1. Left leg pain  Improved, continue current plan      2. Obesity (BMI 30.0-34.9)  Discussed following a low carb diet, limiting fried and processed foods and exercising at least 30 min/ 5 times per week, pt verb understanding.     ERLINDA Jacome-ANDRADE  05/23/2024 10:48 AM        Follow up if symptoms worsen or fail to improve.

## 2024-05-15 ENCOUNTER — PATIENT MESSAGE (OUTPATIENT)
Facility: CLINIC | Age: 39
End: 2024-05-15
Payer: MEDICAID

## 2024-07-14 ENCOUNTER — PATIENT MESSAGE (OUTPATIENT)
Dept: ADMINISTRATIVE | Facility: OTHER | Age: 39
End: 2024-07-14
Payer: MEDICAID

## 2024-10-13 ENCOUNTER — PATIENT MESSAGE (OUTPATIENT)
Dept: ADMINISTRATIVE | Facility: OTHER | Age: 39
End: 2024-10-13
Payer: MEDICAID

## 2025-03-07 ENCOUNTER — PATIENT OUTREACH (OUTPATIENT)
Dept: ADMINISTRATIVE | Facility: HOSPITAL | Age: 40
End: 2025-03-07
Payer: MEDICAID

## 2025-03-07 DIAGNOSIS — Z12.31 SCREENING MAMMOGRAM FOR BREAST CANCER: Primary | ICD-10-CM

## 2025-03-07 NOTE — PROGRESS NOTES
Population Health Chart Review & Patient Outreach Details      Additional Pop Health Notes:    HM and immunization's reviewed and updated.  DUE FOR MAMMO. IF ALREADY DONE, NEED TO GET RECORDS INFORMATION.  Place in visit note to advise.            Updates Requested / Reviewed:      Updated Care Coordination Note, Care Everywhere, and Care Team Updated         Health Maintenance Topics Overdue:      [facility] Score: 1     Mammogram                       Health Maintenance Topic(s) Outreach Outcomes & Actions Taken:    Breast Cancer Screening - Outreach Outcomes & Actions Taken  : Mammogram Order Placed

## 2025-03-10 ENCOUNTER — RESULTS FOLLOW-UP (OUTPATIENT)
Dept: FAMILY MEDICINE | Facility: CLINIC | Age: 40
End: 2025-03-10

## 2025-03-10 ENCOUNTER — HOSPITAL ENCOUNTER (OUTPATIENT)
Dept: RADIOLOGY | Facility: HOSPITAL | Age: 40
Discharge: HOME | End: 2025-03-10
Attending: INTERNAL MEDICINE
Payer: MEDICAID

## 2025-03-10 ENCOUNTER — OFFICE VISIT (OUTPATIENT)
Dept: FAMILY MEDICINE | Facility: CLINIC | Age: 40
End: 2025-03-10
Payer: MEDICAID

## 2025-03-10 VITALS
BODY MASS INDEX: 30.67 KG/M2 | WEIGHT: 202.38 LBS | TEMPERATURE: 98 F | HEART RATE: 61 BPM | DIASTOLIC BLOOD PRESSURE: 80 MMHG | RESPIRATION RATE: 19 BRPM | HEIGHT: 68 IN | SYSTOLIC BLOOD PRESSURE: 124 MMHG | OXYGEN SATURATION: 98 %

## 2025-03-10 DIAGNOSIS — D50.9 IRON DEFICIENCY ANEMIA, UNSPECIFIED IRON DEFICIENCY ANEMIA TYPE: ICD-10-CM

## 2025-03-10 DIAGNOSIS — Z00.00 ANNUAL PHYSICAL EXAM: Primary | ICD-10-CM

## 2025-03-10 DIAGNOSIS — Z12.31 SCREENING MAMMOGRAM FOR BREAST CANCER: ICD-10-CM

## 2025-03-10 DIAGNOSIS — I10 HYPERTENSION, ESSENTIAL, BENIGN: ICD-10-CM

## 2025-03-10 PROCEDURE — 3074F SYST BP LT 130 MM HG: CPT | Mod: CPTII,,, | Performed by: NURSE PRACTITIONER

## 2025-03-10 PROCEDURE — 77063 BREAST TOMOSYNTHESIS BI: CPT | Mod: TC

## 2025-03-10 PROCEDURE — 1160F RVW MEDS BY RX/DR IN RCRD: CPT | Mod: CPTII,,, | Performed by: NURSE PRACTITIONER

## 2025-03-10 PROCEDURE — 3008F BODY MASS INDEX DOCD: CPT | Mod: CPTII,,, | Performed by: NURSE PRACTITIONER

## 2025-03-10 PROCEDURE — 99396 PREV VISIT EST AGE 40-64: CPT | Mod: S$PBB,,, | Performed by: NURSE PRACTITIONER

## 2025-03-10 PROCEDURE — 77063 BREAST TOMOSYNTHESIS BI: CPT | Mod: 26,,, | Performed by: RADIOLOGY

## 2025-03-10 PROCEDURE — 77067 SCR MAMMO BI INCL CAD: CPT | Mod: 26,,, | Performed by: RADIOLOGY

## 2025-03-10 PROCEDURE — 1159F MED LIST DOCD IN RCRD: CPT | Mod: CPTII,,, | Performed by: NURSE PRACTITIONER

## 2025-03-10 PROCEDURE — 3079F DIAST BP 80-89 MM HG: CPT | Mod: CPTII,,, | Performed by: NURSE PRACTITIONER

## 2025-03-10 PROCEDURE — 99213 OFFICE O/P EST LOW 20 MIN: CPT | Mod: PBBFAC,PO | Performed by: NURSE PRACTITIONER

## 2025-03-10 PROCEDURE — 99999 PR PBB SHADOW E&M-EST. PATIENT-LVL III: CPT | Mod: PBBFAC,,, | Performed by: NURSE PRACTITIONER

## 2025-03-10 RX ORDER — VALSARTAN AND HYDROCHLOROTHIAZIDE 320; 25 MG/1; MG/1
1 TABLET, FILM COATED ORAL DAILY
Qty: 90 TABLET | Refills: 1 | Status: SHIPPED | OUTPATIENT
Start: 2025-03-10

## 2025-03-10 NOTE — PROGRESS NOTES
"Subjective:      Patient ID: Pilar Hernandez is a 40 y.o. female.  New to me but seen previously in clinic by a fellow provider. Pt presents to clinic for routine annual exam. She denies any acute complaints. Has recently ~30lbs with diet and exercise. Is taking MVI with valsartan/HCTZ daily without issue.       Review of Systems   Constitutional:  Negative for activity change, appetite change, fatigue, fever, night sweats and unexpected weight change.   Eyes:  Negative for pain and visual disturbance.   Respiratory:  Negative for chest tightness and shortness of breath.    Cardiovascular:  Negative for chest pain and palpitations.   Gastrointestinal:  Negative for abdominal pain, constipation, diarrhea, nausea and vomiting.   Genitourinary:  Negative for difficulty urinating, dysuria and menstrual problem.   Musculoskeletal:  Negative for arthralgias, back pain, gait problem and myalgias.   Integumentary:  Negative for rash.   Neurological:  Negative for headaches.   Psychiatric/Behavioral: Negative.     All other systems reviewed and are negative.        Objective:     Vitals:    03/10/25 0930   BP: 124/80   Pulse: 61   Resp: 19   Temp: 98.1 °F (36.7 °C)   TempSrc: Oral   SpO2: 98%   Weight: 91.8 kg (202 lb 6.1 oz)   Height: 5' 8" (1.727 m)     Physical Exam  Vitals and nursing note reviewed.   Constitutional:       General: She is not in acute distress.     Appearance: Normal appearance. She is well-developed and well-groomed. She is not ill-appearing.   HENT:      Head: Normocephalic and atraumatic.      Right Ear: External ear normal.      Left Ear: External ear normal.      Nose: Nose normal.      Mouth/Throat:      Lips: Pink.      Mouth: Mucous membranes are moist.   Eyes:      General: Lids are normal. Vision grossly intact. Gaze aligned appropriately.      Conjunctiva/sclera: Conjunctivae normal.      Pupils: Pupils are equal, round, and reactive to light.   Neck:      Trachea: Phonation normal. "   Cardiovascular:      Rate and Rhythm: Normal rate and regular rhythm.      Heart sounds: Normal heart sounds.   Pulmonary:      Effort: Pulmonary effort is normal. No accessory muscle usage or respiratory distress.      Breath sounds: Normal breath sounds and air entry.   Abdominal:      General: Abdomen is flat. Bowel sounds are normal. There is no distension.      Palpations: Abdomen is soft.      Tenderness: There is no abdominal tenderness.   Musculoskeletal:      Cervical back: Neck supple.      Right lower leg: No edema.      Left lower leg: No edema.   Skin:     General: Skin is warm and dry.      Findings: No rash.   Neurological:      General: No focal deficit present.      Mental Status: She is alert and oriented to person, place, and time. Mental status is at baseline.      Sensory: Sensation is intact.      Motor: Motor function is intact.      Coordination: Coordination is intact.      Gait: Gait is intact.   Psychiatric:         Attention and Perception: Attention and perception normal.         Mood and Affect: Mood and affect normal.         Speech: Speech normal.         Behavior: Behavior normal. Behavior is cooperative.         Thought Content: Thought content normal.         Cognition and Memory: Cognition and memory normal.         Judgment: Judgment normal.       Assessment and Plan:     1. Annual physical exam (Primary)   Patient Counseling:  --Nutrition: Stressed importance of moderation in sodium/caffeine intake, saturated fat and cholesterol, caloric balance, sufficient intake of fresh fruits, vegetables, fiber, calcium, iron, and 1 mg of folate supplement per day (for females capable of pregnancy).  --Discussed the issue of estrogen replacement, calcium supplement, and the daily use of baby aspirin.  --Exercise: Stressed the importance of regular exercise.   --Substance Abuse: Discussed cessation/primary prevention of tobacco, alcohol, or other drug use; driving or other dangerous  activities under the influence; availability of treatment for abuse.    --Sexuality: Discussed sexually transmitted diseases, partner selection, use of condoms, avoidance of unintended pregnancy  and contraceptive alternatives.   --Injury prevention: Discussed safety belts, safety helmets, smoke detector, smoking near bedding or upholstery.   --Dental health: Discussed importance of regular tooth brushing, flossing, and dental visits.  --Immunizations reviewed.  --Discussed benefits of screening colonoscopy.  --After hours service discussed with patient    2. Hypertension, essential, benign  Continue current treatment regimen.  Dietary sodium restriction.  Regular aerobic exercise.  Weight loss.  Patient Education: Reviewed risks of hypertension and principles of treatment.  - valsartan-hydrochlorothiazide (DIOVAN-HCT) 320-25 mg per tablet; Take 1 tablet by mouth once daily.  Dispense: 90 tablet; Refill: 1  - CBC Auto Differential; Future  - Comprehensive Metabolic Panel; Future  - Ferritin; Future  - Hemoglobin A1C; Future  - Folate; Future  - Magnesium; Future  - Lipid Panel; Future  - Iron and TIBC; Future  - Vitamin B12; Future  - Vitamin D; Future  - TSH; Future  - T4, Free; Future    3. Iron deficiency anemia, unspecified iron deficiency anemia type  Periods remain normal and regular and no current ferrous replacement   - Ferritin; Future  - Folate; Future  - Magnesium; Future  - Iron and TIBC; Future  - Vitamin B12; Future           YOVANY Solorzano, FNP-C  Family/Internal Medicine  [facility]

## 2025-03-11 ENCOUNTER — PATIENT MESSAGE (OUTPATIENT)
Dept: FAMILY MEDICINE | Facility: CLINIC | Age: 40
End: 2025-03-11
Payer: MEDICAID

## 2025-03-11 DIAGNOSIS — E55.9 VITAMIN D DEFICIENCY: Primary | ICD-10-CM

## 2025-03-11 DIAGNOSIS — E83.51 HYPOCALCEMIA: ICD-10-CM

## 2025-03-11 DIAGNOSIS — E61.1 IRON DEFICIENCY: ICD-10-CM

## 2025-03-11 RX ORDER — FOLIC ACID/MULTIVIT,IRON,MINER 0.4MG-18MG
1 TABLET ORAL DAILY
Qty: 30 TABLET | Refills: 11 | Status: SHIPPED | OUTPATIENT
Start: 2025-03-11 | End: 2026-03-11

## 2025-04-14 ENCOUNTER — PATIENT MESSAGE (OUTPATIENT)
Dept: ADMINISTRATIVE | Facility: OTHER | Age: 40
End: 2025-04-14
Payer: MEDICAID

## 2025-06-13 ENCOUNTER — PATIENT MESSAGE (OUTPATIENT)
Dept: FAMILY MEDICINE | Facility: CLINIC | Age: 40
End: 2025-06-13
Payer: MEDICAID

## 2025-06-30 ENCOUNTER — OFFICE VISIT (OUTPATIENT)
Facility: CLINIC | Age: 40
End: 2025-06-30
Payer: MEDICAID

## 2025-06-30 VITALS
SYSTOLIC BLOOD PRESSURE: 130 MMHG | TEMPERATURE: 98 F | HEART RATE: 68 BPM | DIASTOLIC BLOOD PRESSURE: 80 MMHG | RESPIRATION RATE: 18 BRPM | WEIGHT: 208.69 LBS | BODY MASS INDEX: 31.63 KG/M2 | HEIGHT: 68 IN | OXYGEN SATURATION: 99 %

## 2025-06-30 DIAGNOSIS — M79.605 LEFT LEG PAIN: Primary | ICD-10-CM

## 2025-06-30 DIAGNOSIS — I10 HYPERTENSION, ESSENTIAL, BENIGN: ICD-10-CM

## 2025-06-30 PROCEDURE — 3044F HG A1C LEVEL LT 7.0%: CPT | Mod: CPTII,,,

## 2025-06-30 PROCEDURE — 1159F MED LIST DOCD IN RCRD: CPT | Mod: CPTII,,,

## 2025-06-30 PROCEDURE — 3079F DIAST BP 80-89 MM HG: CPT | Mod: CPTII,,,

## 2025-06-30 PROCEDURE — 99214 OFFICE O/P EST MOD 30 MIN: CPT | Mod: PBBFAC,PN

## 2025-06-30 PROCEDURE — 1160F RVW MEDS BY RX/DR IN RCRD: CPT | Mod: CPTII,,,

## 2025-06-30 PROCEDURE — 3008F BODY MASS INDEX DOCD: CPT | Mod: CPTII,,,

## 2025-06-30 PROCEDURE — 99999 PR PBB SHADOW E&M-EST. PATIENT-LVL IV: CPT | Mod: PBBFAC,,,

## 2025-06-30 PROCEDURE — 3075F SYST BP GE 130 - 139MM HG: CPT | Mod: CPTII,,,

## 2025-06-30 PROCEDURE — 99214 OFFICE O/P EST MOD 30 MIN: CPT | Mod: S$PBB,,,

## 2025-06-30 RX ORDER — MELOXICAM 15 MG/1
15 TABLET ORAL DAILY
Qty: 30 TABLET | Refills: 1 | Status: SHIPPED | OUTPATIENT
Start: 2025-06-30

## 2025-06-30 NOTE — PROGRESS NOTES
SUBJECTIVE     History of Present Illness    CHIEF COMPLAINT:  Ms. Marcellus key presents today for leg pain.    HISTORY OF PRESENT ILLNESS:  She reports back pain that initially began around June 12th, which was severe enough to significantly limit mobility. The back pain has since resolved. She currently experiences intermittent leg pain in the posterior aspect that began during the back pain episode. The leg pain is muscular in nature and is triggered by specific movements such as coughing or rising from a seated or lying position. She denies numbness or tingling in the affected leg.    EXERCISE HISTORY:  She attributes the initial back pain to resuming exercise after a period of inactivity. She was performing mountain climber exercises , which was a new form of physical activity for her. She has since discontinued all exercise due to the left leg pain.    WEIGHT MANAGEMENT:  She reports recent weight gain of approximately 10 lbs after previously being engaged in weight management efforts and expresses awareness of her weight fluctuation.    SOCIAL HISTORY:  She is currently on summer break from work as a .      ROS:  General: -fever, -chills, -fatigue, -weight gain, -weight loss  Eyes: -vision changes, -redness, -discharge  ENT: -ear pain, -nasal congestion, -sore throat  Cardiovascular: -chest pain, -palpitations, -lower extremity edema  Respiratory: -cough, -shortness of breath  Gastrointestinal: -abdominal pain, -nausea, -vomiting, -diarrhea, -constipation, -blood in stool  Genitourinary: -dysuria, -hematuria, -frequency  Musculoskeletal: -joint pain, -muscle pain, +pain with movement, +limb pain  Skin: -rash, -lesion  Neurological: -headache, -dizziness, -numbness, -tingling  Psychiatric: -anxiety, -depression, -sleep difficulty           PAST MEDICAL HISTORY:  Past Medical History:   Diagnosis Date    Anemia     Hypertension     Infertility        ALLERGIES AND MEDICATIONS: updated and  "reviewed.  Review of patient's allergies indicates:  No Known Allergies  Current Medications[1]        OBJECTIVE     Physical Exam  Vitals:    06/30/25 1013   BP: 130/80   Pulse: 68   Resp: 18   Temp: 98 °F (36.7 °C)    Body mass index is 31.73 kg/m².  Weight: 94.7 kg (208 lb 10.7 oz)   Height: 5' 8" (172.7 cm)     Physical Exam  Vitals reviewed.   Constitutional:       General: She is not in acute distress.  HENT:      Right Ear: External ear normal.      Left Ear: External ear normal.      Nose: Nose normal.      Mouth/Throat:      Mouth: Mucous membranes are moist.   Eyes:      Extraocular Movements: Extraocular movements intact.      Conjunctiva/sclera: Conjunctivae normal.      Pupils: Pupils are equal, round, and reactive to light.   Pulmonary:      Effort: Pulmonary effort is normal.   Abdominal:      General: There is no distension.      Palpations: Abdomen is soft.   Musculoskeletal:         General: No swelling. Normal range of motion.      Cervical back: Normal range of motion.      Right upper leg: Normal.      Left upper leg: Normal. No swelling, edema, deformity, tenderness or bony tenderness.      Right lower leg: Normal.      Left lower leg: No swelling, deformity, tenderness or bony tenderness. No edema.   Skin:     General: Skin is warm and dry.      Findings: No rash.   Neurological:      General: No focal deficit present.      Mental Status: She is alert and oriented to person, place, and time.   Psychiatric:         Mood and Affect: Mood normal.         Behavior: Behavior normal.                Health Maintenance         Date Due Completion Date    Mammogram 03/10/2026 3/10/2025    Hemoglobin A1c (Diabetic Prevention Screening) 03/10/2028 3/10/2025    Cervical Cancer Screening 03/01/2029 3/1/2024    TETANUS VACCINE 10/13/2030 10/13/2020    RSV Vaccine (Age 60+ and Pregnant patients) (1 - 1-dose 75+ series) 01/25/2060 ---              ASSESSMENT     40 y.o. female with     1. Left leg pain    2. " Hypertension, essential, benign        PLAN:     1. Left leg pain  Pt encouraged to apply ice packs 2-3 times daily at 10 minute intervals x 72 hours, then okay to change to heating compress with care not to burn her self; she  voiced understanding    - meloxicam (MOBIC) 15 MG tablet; Take 1 tablet (15 mg total) by mouth once daily.  Dispense: 30 tablet; Refill: 1    2. Hypertension, essential, benign  - Stable; no acute issues, denies CP,palpitations, SOB  - The current medical regimen is effective;  continue present plan and medications.       Assessment & Plan    Assessed leg pain as muscular in nature, likely due to significant exercise (mountain climbers) without proper preparation.  Ruled out more serious conditions due to absence of numbness and tingling.    LEG PAIN:  - Monitored the patient's intermittent leg pain since June 12th, which is exacerbated by coughing or getting up.  - Ms. Marcellus key denies numbness and tingling in the leg.  - Evaluated and determined the pain is muscular in nature, likely due to exercise.  - Discussed the necessity of proper stretching before and after exercise, recommending daily stretching routines .  - Instructed patient to ice the affected area.  - Advised on benefits of yoga for flexibility and pain management.  - Prescribed meloxicam to be taken daily, as previous treatment with ibuprofen was ineffective.  - Discontinued ibuprofen.    WEIGHT GAIN:  - Monitored patient's 10-pound weight gain following discontinuation of exercise routine.  - Discussed the importance of consistent, low-impact exercise for overall health and weight management.  - Recommend starting with walking at a comfortable pace and gradually increasing intensity as tolerated.  - Suggested utilizing [employer] for beginner-friendly workout videos, including beginner-level dumbbell workouts and yoga routines.    GENERAL ADVICE:  - Advised patient to stay hydrated and aim for consistency in exercise rather  than perfection.         YUSUF Jacome  06/30/2025 2:54 PM      Follow up in about 4 weeks (around 7/28/2025).    I spent a total of 30 minutes on the day of the visit.  This includes face to face time and non-face to face time preparing to see the patient (eg, review of tests), obtaining and/or reviewing separately obtained history, documenting clinical information in the electronic or other health record, independently interpreting results and communicating results to the patient/family/caregiver, or care coordinator.   This note was generated with the assistance of ambient listening technology. Verbal consent was obtained by the patient and accompanying visitor(s) for the recording of patient appointment to facilitate this note. I attest to having reviewed and edited the generated note for accuracy, though some syntax or spelling errors may persist. Please contact the author of this note for any clarification.                 [1]   Current Outpatient Medications   Medication Sig Dispense Refill    nystatin (MYCOSTATIN) powder Apply topically 2 (two) times daily. 60 g 2    nystatin-triamcinolone (MYCOLOG II) cream Apply topically 2 (two) times a day. For heat rash. 60 g 2    PNV cmb#95-ferrous fumarate-FA (PRENATAL) 28 mg iron- 800 mcg Tab Take 1 tablet by mouth Daily. 30 tablet 11    valsartan-hydrochlorothiazide (DIOVAN-HCT) 320-25 mg per tablet Take 1 tablet by mouth once daily. 90 tablet 1    meloxicam (MOBIC) 15 MG tablet Take 1 tablet (15 mg total) by mouth once daily. 30 tablet 1     No current facility-administered medications for this visit.

## 2025-08-07 ENCOUNTER — OFFICE VISIT (OUTPATIENT)
Facility: CLINIC | Age: 40
End: 2025-08-07
Payer: MEDICAID

## 2025-08-07 DIAGNOSIS — E66.811 OBESITY (BMI 30.0-34.9): ICD-10-CM

## 2025-08-07 DIAGNOSIS — M79.605 LEFT LEG PAIN: ICD-10-CM

## 2025-08-07 DIAGNOSIS — I10 HYPERTENSION, ESSENTIAL, BENIGN: Primary | ICD-10-CM

## 2025-08-07 PROCEDURE — 3044F HG A1C LEVEL LT 7.0%: CPT | Mod: CPTII,95,,

## 2025-08-07 PROCEDURE — 1159F MED LIST DOCD IN RCRD: CPT | Mod: CPTII,95,,

## 2025-08-07 PROCEDURE — 1160F RVW MEDS BY RX/DR IN RCRD: CPT | Mod: CPTII,95,,

## 2025-08-07 PROCEDURE — 98004 SYNCH AUDIO-VIDEO EST SF 10: CPT | Mod: 95,,,

## (undated) DEVICE — SEE MEDLINE ITEM 154981

## (undated) DEVICE — BLADE SURG CARBON STEEL SZ11

## (undated) DEVICE — BLANKET UPPER BODY 78.7X29.9IN

## (undated) DEVICE — PAD PREP 50/CA

## (undated) DEVICE — SUT 3-0 CTD VICRYL 27IN PS

## (undated) DEVICE — TROCAR ENDOPATH XCEL 5X100MM

## (undated) DEVICE — SEALER LIGASURE MARYLAND 37CM

## (undated) DEVICE — NDL INSUF ULTRA VERESS 120MM

## (undated) DEVICE — Device

## (undated) DEVICE — KIT ANTIFOG

## (undated) DEVICE — SUPPORT ULNA NERVE PROTECTOR

## (undated) DEVICE — COVER OVERHEAD SURG LT BLUE

## (undated) DEVICE — MANIPULATOR UTERINE

## (undated) DEVICE — TROCAR ENDOPATH XCEL 11MM 10CM

## (undated) DEVICE — APPLICATOR CHLORAPREP ORN 26ML

## (undated) DEVICE — SYR 10CC LUER LOCK

## (undated) DEVICE — TUBING INSUFFLATION 10

## (undated) DEVICE — DEVICE N-SEAL LAPROSCOPIC

## (undated) DEVICE — CLOSURE SKIN STERI STRIP 1/2X4

## (undated) DEVICE — CATH SELF-CATH FEMALE 14FR 6IN

## (undated) DEVICE — SEE MEDLINE ITEM 157110

## (undated) DEVICE — GLOVE SURGICAL LATEX SZ 7

## (undated) DEVICE — ELECTRODE REM PLYHSV RETURN 9

## (undated) DEVICE — PACK LAPAROSCOPY/PELVISCOPY II

## (undated) DEVICE — UNDERGLOVES BIOGEL PI SZ 7 LF

## (undated) DEVICE — SEE L#120831

## (undated) DEVICE — DRESSING ADH ISLAND 2.5 X 3